# Patient Record
Sex: MALE | Race: WHITE | NOT HISPANIC OR LATINO | Employment: OTHER | URBAN - METROPOLITAN AREA
[De-identification: names, ages, dates, MRNs, and addresses within clinical notes are randomized per-mention and may not be internally consistent; named-entity substitution may affect disease eponyms.]

---

## 2017-09-26 PROBLEM — C18.9 ADENOCARCINOMA OF COLON: Status: ACTIVE | Noted: 2017-09-26

## 2017-09-26 PROBLEM — D63.0 ANEMIA IN NEOPLASTIC DISEASE: Status: ACTIVE | Noted: 2017-09-26

## 2018-04-17 PROBLEM — R10.32 LEFT LOWER QUADRANT PAIN: Status: ACTIVE | Noted: 2018-04-17

## 2018-04-17 PROBLEM — D63.0 ANEMIA IN NEOPLASTIC DISEASE: Status: RESOLVED | Noted: 2017-09-26 | Resolved: 2018-04-17

## 2018-10-09 PROBLEM — E66.813 CLASS 3 SEVERE OBESITY WITHOUT SERIOUS COMORBIDITY WITH BODY MASS INDEX (BMI) OF 40.0 TO 44.9 IN ADULT: Status: ACTIVE | Noted: 2018-01-31

## 2018-10-09 PROBLEM — E66.01 CLASS 3 SEVERE OBESITY WITHOUT SERIOUS COMORBIDITY WITH BODY MASS INDEX (BMI) OF 40.0 TO 44.9 IN ADULT: Status: ACTIVE | Noted: 2018-01-31

## 2018-11-12 PROBLEM — T14.8XXD DELAYED WOUND HEALING: Status: ACTIVE | Noted: 2018-11-12

## 2018-11-27 PROBLEM — R11.2 CINV (CHEMOTHERAPY-INDUCED NAUSEA AND VOMITING): Status: ACTIVE | Noted: 2018-11-27

## 2018-11-27 PROBLEM — T45.1X5A CINV (CHEMOTHERAPY-INDUCED NAUSEA AND VOMITING): Status: ACTIVE | Noted: 2018-11-27

## 2018-11-27 PROBLEM — Z71.9 ENCOUNTER FOR EDUCATION: Status: ACTIVE | Noted: 2018-11-27

## 2018-11-27 PROBLEM — K59.00 CONSTIPATION: Status: ACTIVE | Noted: 2018-08-27

## 2018-12-05 PROBLEM — E86.0 DEHYDRATION: Status: ACTIVE | Noted: 2018-12-05

## 2018-12-10 PROBLEM — T45.1X5A CHEMOTHERAPY INDUCED DIARRHEA: Status: ACTIVE | Noted: 2018-12-10

## 2018-12-10 PROBLEM — K12.31 MUCOSITIS DUE TO CHEMOTHERAPY: Status: ACTIVE | Noted: 2018-12-10

## 2018-12-10 PROBLEM — K52.1 CHEMOTHERAPY INDUCED DIARRHEA: Status: ACTIVE | Noted: 2018-12-10

## 2018-12-17 PROBLEM — Z51.11 ENCOUNTER FOR ANTINEOPLASTIC CHEMOTHERAPY: Status: ACTIVE | Noted: 2018-12-17

## 2018-12-17 PROBLEM — M79.89 LEFT UPPER EXTREMITY SWELLING: Status: ACTIVE | Noted: 2018-12-17

## 2018-12-17 PROBLEM — R63.4 WEIGHT LOSS, ABNORMAL: Status: ACTIVE | Noted: 2018-12-17

## 2018-12-17 PROBLEM — I82.409 DVT (DEEP VENOUS THROMBOSIS): Status: ACTIVE | Noted: 2018-12-17

## 2018-12-17 PROBLEM — D70.9 NEUTROPENIA: Status: ACTIVE | Noted: 2018-12-17

## 2019-01-06 PROBLEM — M79.89 LEFT UPPER EXTREMITY SWELLING: Status: RESOLVED | Noted: 2018-12-17 | Resolved: 2019-01-06

## 2019-01-06 PROBLEM — Z71.9 ENCOUNTER FOR EDUCATION: Status: RESOLVED | Noted: 2018-11-27 | Resolved: 2019-01-06

## 2019-01-09 PROBLEM — G62.0 NEUROPATHY DUE TO CHEMOTHERAPEUTIC DRUG: Status: ACTIVE | Noted: 2019-01-09

## 2019-01-09 PROBLEM — R53.0 NEOPLASTIC (MALIGNANT) RELATED FATIGUE: Status: ACTIVE | Noted: 2019-01-09

## 2019-01-09 PROBLEM — R53.1 GENERAL WEAKNESS: Status: ACTIVE | Noted: 2019-01-09

## 2019-01-09 PROBLEM — T45.1X5A NEUROPATHY DUE TO CHEMOTHERAPEUTIC DRUG: Status: ACTIVE | Noted: 2019-01-09

## 2019-01-22 PROBLEM — T14.8XXD DELAYED WOUND HEALING: Status: RESOLVED | Noted: 2018-11-12 | Resolved: 2019-01-22

## 2019-01-22 PROBLEM — Z79.01 ANTICOAGULATED: Status: ACTIVE | Noted: 2019-01-22

## 2019-02-05 PROBLEM — R73.9 BLOOD GLUCOSE ELEVATED: Status: ACTIVE | Noted: 2019-02-05

## 2019-02-05 PROBLEM — L70.8 ACNEIFORM RASH: Status: ACTIVE | Noted: 2019-02-05

## 2019-02-19 PROBLEM — L70.8 ACNEIFORM RASH: Status: RESOLVED | Noted: 2019-02-05 | Resolved: 2019-02-19

## 2019-02-19 PROBLEM — M54.6 ACUTE MIDLINE THORACIC BACK PAIN: Status: ACTIVE | Noted: 2019-02-19

## 2019-02-19 PROBLEM — R10.32 LEFT LOWER QUADRANT PAIN: Status: RESOLVED | Noted: 2018-04-17 | Resolved: 2019-02-19

## 2019-02-19 PROBLEM — R51.9 NONINTRACTABLE HEADACHE: Status: ACTIVE | Noted: 2019-02-19

## 2019-06-11 PROBLEM — K04.7 ABSCESSED TOOTH: Status: ACTIVE | Noted: 2019-06-11

## 2019-06-11 PROBLEM — M54.6 ACUTE MIDLINE THORACIC BACK PAIN: Status: RESOLVED | Noted: 2019-02-19 | Resolved: 2019-06-11

## 2019-06-11 PROBLEM — K12.31 MUCOSITIS DUE TO CHEMOTHERAPY: Status: RESOLVED | Noted: 2018-12-10 | Resolved: 2019-06-11

## 2019-06-11 PROBLEM — R63.4 WEIGHT LOSS, ABNORMAL: Status: RESOLVED | Noted: 2018-12-17 | Resolved: 2019-06-11

## 2019-08-01 ENCOUNTER — PATIENT MESSAGE (OUTPATIENT)
Dept: HEMATOLOGY/ONCOLOGY | Facility: CLINIC | Age: 49
End: 2019-08-01

## 2019-08-16 ENCOUNTER — PATIENT MESSAGE (OUTPATIENT)
Dept: HEMATOLOGY/ONCOLOGY | Facility: CLINIC | Age: 49
End: 2019-08-16

## 2019-09-06 ENCOUNTER — PATIENT MESSAGE (OUTPATIENT)
Dept: HEMATOLOGY/ONCOLOGY | Facility: CLINIC | Age: 49
End: 2019-09-06

## 2019-09-24 ENCOUNTER — INITIAL CONSULT (OUTPATIENT)
Dept: HEMATOLOGY/ONCOLOGY | Facility: CLINIC | Age: 49
End: 2019-09-24
Payer: COMMERCIAL

## 2019-09-24 VITALS
BODY MASS INDEX: 40.43 KG/M2 | TEMPERATURE: 98 F | RESPIRATION RATE: 18 BRPM | DIASTOLIC BLOOD PRESSURE: 74 MMHG | HEIGHT: 74 IN | HEART RATE: 74 BPM | OXYGEN SATURATION: 96 % | SYSTOLIC BLOOD PRESSURE: 131 MMHG | WEIGHT: 315 LBS

## 2019-09-24 DIAGNOSIS — T45.1X5A NEUROPATHY DUE TO CHEMOTHERAPEUTIC DRUG: ICD-10-CM

## 2019-09-24 DIAGNOSIS — G62.0 NEUROPATHY DUE TO CHEMOTHERAPEUTIC DRUG: ICD-10-CM

## 2019-09-24 DIAGNOSIS — I82.622 ACUTE DEEP VEIN THROMBOSIS (DVT) OF LEFT UPPER EXTREMITY, UNSPECIFIED VEIN: ICD-10-CM

## 2019-09-24 DIAGNOSIS — E66.01 CLASS 3 SEVERE OBESITY WITHOUT SERIOUS COMORBIDITY WITH BODY MASS INDEX (BMI) OF 40.0 TO 44.9 IN ADULT, UNSPECIFIED OBESITY TYPE: ICD-10-CM

## 2019-09-24 DIAGNOSIS — C18.9 ADENOCARCINOMA OF COLON: Primary | ICD-10-CM

## 2019-09-24 PROBLEM — R53.83 OTHER FATIGUE: Status: ACTIVE | Noted: 2019-01-09

## 2019-09-24 PROBLEM — Z51.11 ENCOUNTER FOR ANTINEOPLASTIC CHEMOTHERAPY: Status: RESOLVED | Noted: 2018-12-17 | Resolved: 2019-09-24

## 2019-09-24 PROCEDURE — 99999 PR PBB SHADOW E&M-EST. PATIENT-LVL IV: CPT | Mod: PBBFAC,,, | Performed by: INTERNAL MEDICINE

## 2019-09-24 PROCEDURE — 99214 OFFICE O/P EST MOD 30 MIN: CPT | Mod: S$GLB,,, | Performed by: INTERNAL MEDICINE

## 2019-09-24 PROCEDURE — 99214 PR OFFICE/OUTPT VISIT, EST, LEVL IV, 30-39 MIN: ICD-10-PCS | Mod: S$GLB,,, | Performed by: INTERNAL MEDICINE

## 2019-09-24 PROCEDURE — 3008F BODY MASS INDEX DOCD: CPT | Mod: CPTII,S$GLB,, | Performed by: INTERNAL MEDICINE

## 2019-09-24 PROCEDURE — 3008F PR BODY MASS INDEX (BMI) DOCUMENTED: ICD-10-PCS | Mod: CPTII,S$GLB,, | Performed by: INTERNAL MEDICINE

## 2019-09-24 PROCEDURE — 99999 PR PBB SHADOW E&M-EST. PATIENT-LVL IV: ICD-10-PCS | Mod: PBBFAC,,, | Performed by: INTERNAL MEDICINE

## 2019-09-24 RX ORDER — METFORMIN HYDROCHLORIDE 1000 MG/1
1000 TABLET ORAL
COMMUNITY
Start: 2019-09-26 | End: 2020-06-15

## 2019-09-24 RX ORDER — DULOXETIN HYDROCHLORIDE 20 MG/1
20 CAPSULE, DELAYED RELEASE ORAL DAILY
Qty: 30 CAPSULE | Refills: 0 | Status: SHIPPED | OUTPATIENT
Start: 2019-09-24 | End: 2019-10-16

## 2019-09-24 RX ORDER — GLIPIZIDE 5 MG/1
5 TABLET, FILM COATED, EXTENDED RELEASE ORAL 2 TIMES DAILY
COMMUNITY
Start: 2019-09-20 | End: 2020-06-15

## 2019-09-24 NOTE — PROGRESS NOTES
FOLLOW-UP     DATE: 09/24/2019  Patient Name: Fredis Ugarte  Diagnosis: colon ca      Subjective:       Patient ID: Fredis Ugarte is a 49 y.o. male.    Chief Complaint: Colon Cancer    HPI  50yo male presents for follow-up diagnosis colon cancer.       Oncologic history:  5/2017 - passing blood for 3 mo - BRB. Occasionally difficult with BM, diarrhea, constipation which was a change. No ab pain.   8/1/17 - CT a/p - Circumferential mass sigmoid colon causing significant luminal narrowing measuring approximately 2.6 X 2.8cm highly suggestive of malignancy with no evidence of metastatic disease to abdomen or pelvis.   8/1/17 he underwent colonoscopy that confirmed lesion at 20cm endophytic and ulcerated, 1/2 of colon lumen.   8/2/17 - hemicolectomy -     Pathology: low grade adenocarcinoma invading through muscularis propria into subserosal adipose tissue, neg margins, no LVI or PNI, no tumor deposits. 12 LN negative for tumor. Final pathologic stage pT3 pN0 - stage II. THI, intact.  8/10/17 CBC shows Hg 12.1g/dL, CMP noted and unremarkable. CEA is 2.2.  9/2017 - post-operative infections, required inpatient and outpatient I&D at incision site  9/27/17 - initial med onc eval  9/27/17 - CEA<0.3, ferritin 251, CBC, CMP good  10/27/17 - CT chest - PRIYANK (to complete staging)  12/18/17 - CEA <0.3, ferritin 163. CBC/CMP good  4/16/18 - CT C/A/P - PRIYANK, ferritin >100, CEA <0.3, Hg 16.3g/dL, CMP and CBC WNL  8/20/18- CEA 0.6  , ferritin. BM abnormal, referral back to GI.      9/27/18 - colonoscopy - biopsy of ulcer at anastomosis - pathology consis with adenocarcinoma  10/8/18 - CT c/a/p - PRIYANK   10/9/18 - med onc follow-up. Refer back to surgery.   10/22/18 - hemicolectomy   Pathology: pT3 pN1a w/ 1 of 16 LN positive for malignancy. Well to mod differentiated. No tumor deposits. R0 resection.   Primary tumor 1.5cm in size invading to subserosal fat but not to serosa. LVI noted. No PNI noted.   11/12/18  - med onc  "follow-up  12/3/18: C1D1 FOLFOX  12/5/18 - N/V, diarrhea, "miserable" symptoms per patient  12/6/18 - call with continued nausea, dirrhea better  12/9/18 - call with mucositis symptoms.   12/10/18: Tox check per MD     D8 and he reports multiple complaints and SE from chemo.     Day 1 - jaw pain, redness to feet and hands - this all resolved in 24hrs. also nausea, headache.   Day 2 - vomiting and constipation - dulcolax - then diarrhea started on D3  Day 3 - diarrhea severe that AM and took IVF and nausea med IV that helped at that time, this nausea recurred that night.  Day 4 - diarrhea better after imodium  Day 5 - took zofran and pheenrgan 25mg every 5 hours, this helped but not resolved it and needed decadron also.  Day 6 - mouth sores and tongue swelling developed. Felt "colon on fire", would "burp acid", more painful. Called on call doctor and got chlorhexidine  Day 7 - constipation 2 days again so he took MOM and had diarrhea 8 episodes.  12/10/18  D8 and he is feeling much better, he feels this is first improved day but normal. Extreme sensitivity to smell. Still mild nausea, no vomiting. Energy mild better. Pain with port when sleeping.    12/17/18 - C2D1 and reports continues to feel unwell. WBC 1.0k, plts 108k  D10 Started with diarrhea again and took imodium, lomotil, took a few days for relief.   Ativan helping nausea but it will not resolve. Minimal vomiting. Eating only soup. Lost weight. Some pain with swallowing.  New LUE swelling that started after sleeping and has been worsening.   No fever. No mouth sores.  Continues with pain, diffuse body aches and abdomen worse than chronic continuous pain. No blood in stool. Diarrhea is light colored. Urinating is okay.      12/17/18 - U/S LUE - Positive study. Occlusive thrombus within the left internal jugular, subclavian and axillary veins.  lovenox today in chemo and then start xarelto, discuss w/patient in infusion.   12/17/18 - DPD testing - resulted to " "us ~1/3/18 negative for enzyme deficiency.  12/19/18 - WBC 1.5k, hg 12.8g/dL, plts 118k  12/26/18 - wbc 27.4K, Hg 12.3g/dL, plts 109k  1/7/19 -  presents for follow-up and consider cycle 2 FOLFOX  1/14/19: tox check - feeling much better after dose reductions. still constipation and severe abd pain due to constipation. Neuropathy stable, persistent.  1/22/19 - C3 1800mg/m2 at actual body weight and BSA 2.75   2/5/19 - C4   Feels okay today but only for last 3 days. Prior to this felt unwell from chemo starting on day 6, severe fatigue and severe body aches diffuse, not specific pain in one place. In the bed. He stops dex supp on Saturday which is 5 and attributes the sudden severe symptoms to this. This then lasts a few days of feeling "miserable". No mouth sores with mouth wash, no N/V/D/C which is a huge improvement, constipation is improved due to daily laxative and stool softener not prn. Also right neck pain this cycle but this resolved. Previous had been left. No inc swelling to area of chronic DVT LUE. Urination okay. Ab pain is okay.     Neuropathy continues. Better in hands, continues in feet constant after one cycle of oxali in December and he doesn't feel improving. Labs are good and reviewed with pts, cytopenias have thankfully not recurred.     2/19/19: C5D1 - delayed to 2/26/19  On 2/19/19 - felt very unwell after last cycle with higher dose and this has lasted to today,headache ongoing, back pain, severe fatigue. Neck tightening feeling he got when chemo was "higher dose" before that had resolved and now recurs. He is eating but sometimes hurts to swallow. No fever. No diarrhea. +nausea. Labs are good and reviewed. He has felt quite miserable and wants to go back down to prior dose which is very reasonable. neurontin did help his neuropathy greatly so he is happy about that.     3/12/19 C6D1  Feeling okay today. SE with last cycle did recur but he does feel more tolerable and last shorter duration at " "this dose compared to last. Pain chest neck head recurred and resolved with pain meds and time. No change. BM okay. Energy is poor. No fever.     3/26/19: C7D1 today:   Feeling well today with minimal pain at this time. He states that the dilaudid is helping. He finds he is doing better now that the dose have been reduced on the 5FU but he is still having pain to the right shoulder. MD had advised the port be removed due and a new one placed and pt is refusing at this time. Overall he is feeling more fatigued but is able to continue doing his ADLs, he worked out in the yard yesterday which is great. Constipation under control with medication regime of stool softeners and miralax. Nausea controlled with phenergan. Appetite ok. Denies fever, coughing, new or worsening pain. SOB only upon exertion.       4/9/19: C8D1 - reports severe fatigue last cycle and nausea which was previously controlled and now recurring despite meds. Only felt mild better this AM after whole 2 weeks. QUINTERO continues and mild worse. Pain after chemo continues but dilaudid helping greatly so he feels this isn't major issue any longer. BM managed with meds. No fever chills. +mouth sores. Swelling in left arm remains better. He has rash bilat forearms today discussed likely sun exposure related with cehmo he is on and strict precaution going forward.     He is reluctant to continue chemo due to feeling "miserable" but wants to continue as well to "get it done". We reviewed labs are good overall.     4/30/19: C9D1- today pt is doing well with little complaints. He feels better than he has ever felt and contributes it to the lower 5FU dose and taking decadron scheduled. He had enough energy last weekend to go fishing. Minimal pain. Bowels are controlled. Neuropathy slight, not worsening. Taking gabapentin 300mg at night which is helping to decrease daytime drowsiness. Overall feeling very good today.     5/14/19: today is C10D1. Severe fatigue - he " skipped IVF because he felt well and now attributes this to feeling unwell this cycle - last cycle was much improved. No fever, mouth sores, he does have wax wane poor appetite, still eating. Pain is unchanged, mild and well controlled with dilaudid. No new pain. No ab pain. No tylenol or EtOH, discussed LFT trend.     5/28/19: C11D1 today. Pt is feeling well today. He states that he had enough energy to go fishing over the weekend and felt like the break he had was much needed.      6/11/19: patient feeling well but contacted by dentist 6/10/10 about abscess tooth and need to have pulled this week. He reports today had it drained 6/10 and on abx with relief of pain. He otherwise is feeling well and scans 6/10/19 show PRIYANK which is great news. He reports continued severe fatigue with chemo that lasts longer each cycle. He is due for last cycle of chemo today but due to dental infection, long discussion about timing of last cycle chemo, omit versus delay, long term plan for surveilancce, risk for recurrence, mgmt of anticoag and DVT/mediport.     9/24/19 - today patient presents for follow-up now in surveillance.   He is feeling continually better overall now that he is not on chemotherapy.  Neuropathy continues to be a major issue and very painful at night despite Neurontin 300 mg.  He could not tolerate Neurontin during the day due to somnolence.  The daytime discomfort is less.  Fatigue is present but better.  He is more active.  0 GI symptoms.  He had a colonoscopy September 7th that he reports showed great results, we will obtain.  No change in stool.  His MediPort was removed and happy about that.  He continues on his anticoagulant for DVT associated with MediPort and we will plan to stop that in 1 month.  We reviewed recent CT scans and labs which are all good result, remains PRIYANK.  He is now under treatment for diabetes with 2 medications, blood glucose is much improved.  He is motivated to lose weight and diet  "so that he hopefully can come off of these medications.  Wife very supportive.    Past medical, surgical, family, and social history were reviewed today and there are no changes of note unless mentioned in HPI.     MEDS and ALLERGIES were reviewed with patient and meds reconciled.       Review of Systems      Constitutional: Negative for activity change, appetite change, positive fatigue, fever and unexpected weight change.   HENT: Negative for mouth sores and sore throat.    Respiratory: Negative for cough and shortness of breath.    Cardiovascular: Negative for chest pain, palpitations and leg swelling.   Gastrointestinal: Negative for abdominal pain, constipation and diarrhea.   Genitourinary: Negative for dysuria and frequency.   Musculoskeletal: Negative for back pain and joint swelling.   Neurological: Negative for weakness, light-headedness and +numbness.   Hematological: Does not bruise/bleed easily.   Skin: no rash, no lesions, no itching      Objective:      /74 (BP Location: Right arm, Patient Position: Sitting, BP Method: Large (Automatic))   Pulse 74   Temp 97.9 °F (36.6 °C) (Oral)   Resp 18   Ht 6' 2" (1.88 m)   Wt (!) 143.4 kg (316 lb 2.2 oz)   SpO2 96%   BMI 40.59 kg/m²      Wt Readings from Last 8 Encounters:   09/24/19 (!) 143.4 kg (316 lb 2.2 oz)   06/26/19 (!) 141 kg (310 lb 12.8 oz)   06/24/19 (!) 140.6 kg (310 lb)   06/21/19 (!) 142.7 kg (314 lb 9.6 oz)   06/19/19 (!) 143.2 kg (315 lb 9.6 oz)   06/11/19 (!) 141.4 kg (311 lb 12.8 oz)   06/05/19 (!) 140.6 kg (310 lb)   06/03/19 (!) 141.6 kg (312 lb 3.2 oz)     Physical Exam    Constitutional: He is oriented to person, place, and time. No distress.  appears much improved from last visit closer to chemo.  Alopecia resolved.  HENT: Mouth/Throat: Oropharynx is clear and moist. No oropharyngeal exudate.   Eyes: Conjunctivae and EOM are normal.   Neck: Normal range of motion. Neck supple.   Cardiovascular: Normal rate, regular rhythm, " normal heart sounds and intact distal pulses.    Pulmonary/Chest: Effort normal and breath sounds normal. he has no wheezes.   He has no rales.    Abdominal: Soft. Bowel sounds are normal. he exhibits no distension. There is no tenderness.   Musculoskeletal: he exhibits no edema or tenderness.   Lymphadenopathy:     he has no cervical adenopathy. no supraclavicular adenopathy. No axillary LAD.   Neurological: he is alert and oriented to person, place, and time. he has normal strength. No cranial nerve deficit or sensory deficit.   Skin: Skin is warm and dry. No rash noted. No erythema.   Psychiatric: he has a normal mood and affect. speech is normal.   Nursing note and vitals reviewed.      Recent Labs     09/23/19  0826   WBC 7.00   RBC 5.22   HGB 15.2   HCT 45.4   MCV 87   MCH 29.1   MCHC 33.4   RDW 14.5      MPV 8.5*   GRAN 62.4  4.4   LYMPH 26.0  1.8   MONO 8.2  0.6   NRBC 0   EOSINOPHIL 2.6   BASOPHIL 0.8   DIFFMETHOD Automated         Recent Labs     09/23/19  0826      K 4.7      CO2 24   *   BUN 17   CREATININE 0.90   CALCIUM 9.5   PROT 7.0   ALBUMIN 4.0   BILITOT 0.4   ALKPHOS 135   AST 34   ALT 36   ESTGFRAFRICA >60   EGFRNONAA >60       Cbc, cmp reviewed today and results discussed , improved      Assessment:       1. Adenocarcinoma of colon    2. Class 3 severe obesity without serious comorbidity with body mass index (BMI) of 40.0 to 44.9 in adult, unspecified obesity type    3. Acute deep vein thrombosis (DVT) of left upper extremity, unspecified vein    4. Neuropathy due to chemotherapeutic drug      Cancer Staging  Adenocarcinoma of colon  Staging form: Colon and Rectum, AJCC 7th Edition  - Clinical stage from 9/26/2017: Stage IIA (T3, N0, M0) - Unsigned  - Clinical: No stage assigned - Unsigned  - Pathologic stage from 11/12/2018: Stage IIIB (T3, N1a, cM0) - Unsigned    ECOG SCORE       ECOG 1-2    46yo male here with diagnosis pT3 pN0 stage II low grade adenocarcinoma of  the colon. THI intact. Diagnosed 8/2017. THEN with recurrence 9/2018, pT3 pN1 stage III again low-mod grade adenocarcinoma.    At initial visit:  Discussed diagnosis, work-up, staging and treatment recommendations in great detail with patient and family  Discussed overall low risk features about his pathology, including low grade, T3, no PNI or LVI, 12 negative LN, no obstruction at diagnosis. This is overall good in regards to no indication for adjuvant chemotherapy to improve chance of cure. Recommend go into surveillance.     discussed importance of MSI testing which we would want on any stage II-IV tumor but additionally for hereditary evaluation since he is less than 50 years old and he does have colon cancer in his family. This was negative.    NCCN guidelines suggest H&P and CEA every 3-6 mo for 2 years then every 6 mo for 5 years then yearly thereafter. CT c/a/p every 6-12 mo with preference for every 12 mo for a total of 5 years. Colonoscopy in one year unless there was obstructing lesion at diagnosis - then 3-6 mo out from treatment. PET-CT is not recommended. Also consider aspirin daily to decrease risk of recurrence in colon cancer.     unfortunately 9/2018 recurrence at anastomosis, surgery 10/2018 s/p sigmoidectomy with stage III recurrent disease.     discussed this in great detail, staging, standard of care adjuvant chemo FOLFOX since he did not receive chemo initially, this is per NCCN guidelines to decrease his risk of recurrence going forward. Discussed benefits and risks as well as side effect profile of recommended chemo regimen FOLFOX. Discussed nutrition, exercise, restrictions during chemotherapy.    12/3/18: C1D1 FOLFOX today.     12/10/18 - Severe SE with C1, discussed in great detail, will adjust chemo, discussed benefit/risk of dose adjustment in curative setting, will just drop bolus for now and add supportive meds. SE with C1 were so severe he would not continue if it remained unchanged  anyway. Will add supportive meds and re-eval closely, I think we can improve greatly with C2. Fortunately labs after C1 look excellent so that is good news.     1/9/19: cont multiple SE overall improved but still present and concerning including G3 neuropathy symptoms,  and neutropenic unusual late-onset and prolonged. Concern for greater SE than expected, we discussed large dose due to BSA and rationale for curative intent treatment at full dose. Very poor tolerance/SE at that dose, DYPD testing did not reveal enzyme deficiency to explain his reaction. He is obese and deconditioned and I do think this contributes to his side effects, advised PT/exercise. Extensive discussion about all complaints in HPI plus low ANC, neutropenia risks, treatment, future prevention, precautions to take, and in context of his GI symptoms. QUINTERO is of concern with recent DVT as well, advised CT PE protocol before chemo given today. See plan below.     Discussed if he doesn't tolerate 50% dose reduction 5FU only today - would recommend referral to larger center to discuss chemo options since he cannot tolerate standard of care option, FOLFOX/XELOX being the only standard of care option in this scenario per guidelines. He and his wife show good understanding of all of this information and plan.     1/14/19: pt doing well today.     1/22/19: tolerated 50% dose red well, will increase to 75% of total dose 1800mg/m2. Continues with neuropathy so we will not plan to restart oxaliplatin.  Discussed again pros cons of dose reductions versus risk otherwise, he is agreeable to current plan.     2/5/19 - we discussed his multiple cont SE (and adjusted mgmt plan) in great detail and risk of chemo less effective with dose reduction. He is agreeable to increase to total of 2400mg/m2 at adjusted body weight which calculates BSA around 2.35 instead of 2.75. Diabetes concern with dex, refer to PCP, discussed pros/cons in great detail of dex in this  setting and will continue. He knows there are long term health risk to continuing steroids, at least they are not daily.    2/19/19: severe SE again last cycle with HA, back pain, throat pain, he feels similar to first dose. Discussed pros/cons change dosing and we will resume prior dose. They understand risk of chemo less effective, he has had tough course, they would like to proceed with chemo as done last cycle and remain without plan to add oxali since his neuropathic pain has persisted now two months. Discussed tx today for SE, hydration, meds and re-eval closely, delay tx and hopefully sxs improved to tx next week. discussed imaging utility today in regards to his complaints of pain and he declines because he knows we have done imaging in past for same symptoms with no results of concern, I think this is reasonable.     3/12/19 - doing well today overall considering prior SE though he is still having notable SE of severe fatigue, nausea, diarrhea and constipation, taste change, lower appetite and intake. We reviewed intervention for each and it is improved enough to be tolerable per his report. Reviewed continue current chemo dose and continue plan to complete 12 cycles adjuvant. discussed mediport, UE DVT, and chest pain/throat pain - that discomfort has greatly improved on lower dose of chemo, his arm swelling is greatly resolved today, but overall we will likely eval remove port at end of chemo due to all of these symptoms/complications and could stop anticoag if port removed. He knows there is risk to need port replaced if relapse.       3/26/19: today C7- proceed with treatment . After port dye study last week, MD reviewed images with radiologist. She does believe that the chemo could be infusing into some collateral veins to the right shoulder causing discomfort and pain. It is strongly advised that the port is to be removed and a new port placed. Damage to tissue and permanent damage can potentially  "take place. Pt understands this and still refuses to remove port. He will use this cycle as a "trial" and if continues to have pain with dilaudid he will consider having new port placed.    4/9/19 - C8 today: Very poor tolerance, fatigue, low stamina, "feel like i'm dying", does not think he can continue same dose/schedule and tolerate.   High risk decision making to discuss consider dec dose but complex decision since he is aware this can dec efficacy of decreasing recurrence risk.     Recommend delay one week and dose reduce for C8 on 4/16/19. He is reluctant to delay because he wants to stay on time but in agreement that he is likely to have high risk severe SE if he receivs chemo today since he is feeling so poorly. He will delay until 4/16/19. He is very agreeable to dose reduciotn. Also careful protection from sun.    4/30/19: C9D1 on 1800mg/m2 5FU dosed at BSA on 2.3. Pt tolerated this dose very well and we will remain at this dose. He will take decadron scheduled like he did with last cycle and will keep a close eye on his blood glucose. He was taking 8mg of dex, 4mg in am and 4 mg in afternoon. I have advised against this. I have instructed to take 4mg daily, if needed. He will try this. He appears to have gotten sun when fishing over the weekend, I have educated on sun safety, especially while on 5FU which can make make one more susceptible in the sun. MD updated and will see pt in infusion. Call with any questions or concerns.     5/14/19: here for C10 and feeling very unwell, worsened fatigue, weakness, and LFTs worsening. Discussed in great detail pros/cons/risks of proceeding with chemo and his history of complications with chemo. Will skip this cycle, he has plans next week he will not cahnge and we will continue on planned date of C11 in 2 weeks. High risk MDM, patient in agreement with this plan.    5/28/19: proceed with C11 today. He is feeling well with no complaints at this time. His break from " chemo was much needed and he is ready to resume. Will follow up with MD in 2 weeks for C12 then enter surveillance. MD updated on labs and patient status and MD will sign off on chemo orders.  All questions and concerns addressed.  Instructed patient to call for any new or worsening symptoms and/or side effects.     6/11/19: absccess tooth, discussed no chemo today and decision to delay one week so this can be addressed. Discussed CT findings including GGO in lungs and hepatic steatosis. Discussed surveillance plan after this last chemo cycle and risk recurrence. Discussed plan to remove mediport due to DVT notable and would plan to stop anticoag a few months after removal, he does have long term VTE risk but more so during active cancer/chemo/mediport in place. He agrees with having removed so he could stop anticoag. Multiple medical problems to consider overweight, increased BG, liver function, chemo SE, low stamina/strength. He is overall stable today and hopefully these can be addressed/improved as he completes chemo.     He reports colonscopy scheduled in 9/2019, he will try to get mediport removed at same time. He understands risk for recurrence and need for replacement of mediport if that occurs. We discussed his recurrence not evident on scans in past and his CEA did not increase, so we will continue Q3 month scans, colonoscopy with GI/surgery, and new symptoms/signs should prompt eval asap which is how this recurrence was found when he developed GI symptoms and had a GI eval and colonoscopy.     TODAY 09/24/2019 he is doing very well and remains clinically PRIYANK based on colonoscopy and CT scans, lab work.  Neuropathy debilitating and severe, discussed management, we will stop Neurontin and start Cymbalta.  Review surveillance plan in great detail due to his history of a recurrence, we will continue close follow-up for now.  Reviewed principles of survivorship, healthy weight and exercise.  Also discussed  anticoagulation and prior venous thrombosis, MediPort now removed which is good, 0 symptoms  .     Plan:       Fredis was seen today for colon cancer.    Diagnoses and all orders for this visit:    Adenocarcinoma of colon    Class 3 severe obesity without serious comorbidity with body mass index (BMI) of 40.0 to 44.9 in adult, unspecified obesity type    Acute deep vein thrombosis (DVT) of left upper extremity, unspecified vein    Neuropathy due to chemotherapeutic drug             PLAN:    Get colonoscopy report from 9/7/19 Dr. Logan Aburto office    Lab, CT scans 3 mo with phone review of results   Cbc, cmp, ferritin, iron profile, CEA    Lab, CT scans, MD vis in 6 mo    Continue yearly colonoscopy planned    Continue PCP follow-up, DM follow-up    Lose weight, exercise, this is planned    Stop neurontin, start cymbalta. Will start with 20mg daily and re-eval.       Plan above discussed in detail and all questions answered to their satisfaction.      Kinga Lee M.D.  Hematology Oncology  [facility]

## 2019-10-16 DIAGNOSIS — G62.0 NEUROPATHY DUE TO CHEMOTHERAPEUTIC DRUG: ICD-10-CM

## 2019-10-16 DIAGNOSIS — T45.1X5A NEUROPATHY DUE TO CHEMOTHERAPEUTIC DRUG: ICD-10-CM

## 2019-10-16 RX ORDER — DULOXETIN HYDROCHLORIDE 30 MG/1
30 CAPSULE, DELAYED RELEASE ORAL DAILY
Qty: 30 CAPSULE | Refills: 0 | Status: SHIPPED | OUTPATIENT
Start: 2019-10-16 | End: 2019-10-28 | Stop reason: ALTCHOICE

## 2019-10-16 NOTE — TELEPHONE ENCOUNTER
Patient contacted cymbalta effective and request refill. Will increase to 30mg daily and re-eval.    GM

## 2019-10-21 DIAGNOSIS — T45.1X5A NEUROPATHY DUE TO CHEMOTHERAPEUTIC DRUG: ICD-10-CM

## 2019-10-21 DIAGNOSIS — G62.0 NEUROPATHY DUE TO CHEMOTHERAPEUTIC DRUG: ICD-10-CM

## 2019-10-21 RX ORDER — DULOXETIN HYDROCHLORIDE 20 MG/1
CAPSULE, DELAYED RELEASE ORAL
Qty: 30 CAPSULE | Refills: 0 | OUTPATIENT
Start: 2019-10-21

## 2019-10-27 ENCOUNTER — NURSE TRIAGE (OUTPATIENT)
Dept: ADMINISTRATIVE | Facility: CLINIC | Age: 49
End: 2019-10-27

## 2019-10-27 DIAGNOSIS — G62.0 NEUROPATHY DUE TO CHEMOTHERAPEUTIC DRUG: Primary | ICD-10-CM

## 2019-10-27 DIAGNOSIS — T45.1X5A NEUROPATHY DUE TO CHEMOTHERAPEUTIC DRUG: Primary | ICD-10-CM

## 2019-10-27 DIAGNOSIS — F41.9 ANXIETY: ICD-10-CM

## 2019-10-27 NOTE — TELEPHONE ENCOUNTER
Reason for Disposition   Depression is main problem or symptom (e.g., feelings of sadness or hopelessness)   [1] Depression AND [2] unable to do any of normal activities (e.g., self care, school, work; in comparison to baseline).    Additional Information   Negative: Severe difficulty breathing (e.g., struggling for each breath, speaks in single words)   Negative: Bluish (or gray) lips or face now   Negative: Difficult to awaken or acting confused (e.g., disoriented, slurred speech)   Negative: Hysterical or combative behavior   Negative: Sounds like a life-threatening emergency to the triager   Negative: Chest pain   Negative: Palpitations, skipped heart beat, or rapid heart beat   Negative: Cough is main symptom   Negative: Suicide thoughts, threats, attempts, or questions   Negative: Patient attempted suicide   Negative: Patient is threatening suicide now   Negative: Violent behavior, or threatening to physically hurt or kill someone   Negative: [1] Patient is very confused (disoriented, slurred speech) AND [2] no other adult (e.g., friend or family member) available   Negative: [1] Difficult to awaken or acting very confused (disoriented, slurred speech) AND [2] new onset   Negative: Sounds like a life-threatening emergency to the triager   Negative: Alcohol use, abuse or dependence, question or problem related to   Negative: Drug abuse or dependence, question or problem related to   Negative: Bipolar disorder (manic depression)   Negative: Depression during the postpartum period (< 1 year since delivery)    Protocols used: ANXIETY AND PANIC ATTACK-A-AH, DEPRESSION-A-AH    Pt stated he recently increased his Cymbalta from 20 mg to 30 mg per instructions of his Doctor. Pt stated his is having a major panic attack since the medication was increased 3 days ago and severe depression that interferes with his ability to perform normal activities. Per triage protocol, Pt advised to go to ED. Pt  asked if he cannot stop medication. Advised to discuss with Provider in ED.

## 2019-10-28 RX ORDER — ALPRAZOLAM 0.5 MG/1
0.5 TABLET ORAL 3 TIMES DAILY PRN
Qty: 30 TABLET | Refills: 0 | Status: SHIPPED | OUTPATIENT
Start: 2019-10-28 | End: 2020-06-15

## 2019-10-28 RX ORDER — DULOXETIN HYDROCHLORIDE 20 MG/1
20 CAPSULE, DELAYED RELEASE ORAL DAILY
Qty: 30 CAPSULE | Refills: 2 | Status: SHIPPED | OUTPATIENT
Start: 2019-10-28 | End: 2019-11-11

## 2019-10-28 NOTE — TELEPHONE ENCOUNTER
Spoke with patient this morning, he did not go to ED, feels symptoms improving but anxiety still present and did not take the cymbalta yesterday. Discussed decrease back to 20mg dose he was doing well on and will also send xanax to pharmacy for current acute anxiety/panic attack. Directed to let us know if symptoms don't improve in next 1-2 days.     GM

## 2019-11-09 DIAGNOSIS — T45.1X5A NEUROPATHY DUE TO CHEMOTHERAPEUTIC DRUG: ICD-10-CM

## 2019-11-09 DIAGNOSIS — G62.0 NEUROPATHY DUE TO CHEMOTHERAPEUTIC DRUG: ICD-10-CM

## 2019-11-11 RX ORDER — DULOXETIN HYDROCHLORIDE 30 MG/1
CAPSULE, DELAYED RELEASE ORAL
Qty: 30 CAPSULE | Refills: 0 | OUTPATIENT
Start: 2019-11-11

## 2019-11-11 NOTE — TELEPHONE ENCOUNTER
Received request for refill on 30mg, I thought we changed this to 20mg after adverse reaction? Leandra, can you call patient and inquire? THANKS  HAN

## 2019-12-11 ENCOUNTER — PATIENT MESSAGE (OUTPATIENT)
Dept: HEMATOLOGY/ONCOLOGY | Facility: CLINIC | Age: 49
End: 2019-12-11

## 2020-02-10 ENCOUNTER — PATIENT MESSAGE (OUTPATIENT)
Dept: HEMATOLOGY/ONCOLOGY | Facility: CLINIC | Age: 50
End: 2020-02-10

## 2020-02-10 DIAGNOSIS — C18.9 ADENOCARCINOMA OF COLON: Primary | ICD-10-CM

## 2020-02-10 DIAGNOSIS — R10.9 ABDOMINAL PAIN, UNSPECIFIED ABDOMINAL LOCATION: ICD-10-CM

## 2020-02-12 ENCOUNTER — PATIENT MESSAGE (OUTPATIENT)
Dept: HEMATOLOGY/ONCOLOGY | Facility: CLINIC | Age: 50
End: 2020-02-12

## 2020-03-23 ENCOUNTER — TELEPHONE (OUTPATIENT)
Dept: HEMATOLOGY/ONCOLOGY | Facility: CLINIC | Age: 50
End: 2020-03-23

## 2020-03-23 ENCOUNTER — PATIENT MESSAGE (OUTPATIENT)
Dept: HEMATOLOGY/ONCOLOGY | Facility: CLINIC | Age: 50
End: 2020-03-23

## 2020-03-23 ENCOUNTER — TELEMEDICINE (OUTPATIENT)
Dept: HEMATOLOGY/ONCOLOGY | Facility: CLINIC | Age: 50
End: 2020-03-23

## 2020-03-23 DIAGNOSIS — R73.9 BLOOD GLUCOSE ELEVATED: ICD-10-CM

## 2020-03-23 DIAGNOSIS — E66.01 CLASS 3 SEVERE OBESITY WITHOUT SERIOUS COMORBIDITY WITH BODY MASS INDEX (BMI) OF 40.0 TO 44.9 IN ADULT, UNSPECIFIED OBESITY TYPE: ICD-10-CM

## 2020-03-23 DIAGNOSIS — R53.83 FATIGUE, UNSPECIFIED TYPE: ICD-10-CM

## 2020-03-23 DIAGNOSIS — T45.1X5A NEUROPATHY DUE TO CHEMOTHERAPEUTIC DRUG: ICD-10-CM

## 2020-03-23 DIAGNOSIS — R53.1 GENERAL WEAKNESS: ICD-10-CM

## 2020-03-23 DIAGNOSIS — R10.9 ABDOMINAL PAIN, UNSPECIFIED ABDOMINAL LOCATION: Primary | ICD-10-CM

## 2020-03-23 DIAGNOSIS — R10.9 ABDOMINAL PAIN, UNSPECIFIED ABDOMINAL LOCATION: ICD-10-CM

## 2020-03-23 DIAGNOSIS — G62.0 NEUROPATHY DUE TO CHEMOTHERAPEUTIC DRUG: ICD-10-CM

## 2020-03-23 DIAGNOSIS — C18.9 ADENOCARCINOMA OF COLON: Primary | ICD-10-CM

## 2020-03-23 PROBLEM — K52.1 CHEMOTHERAPY INDUCED DIARRHEA: Status: RESOLVED | Noted: 2018-12-10 | Resolved: 2020-03-23

## 2020-03-23 RX ORDER — HYDROCODONE BITARTRATE AND ACETAMINOPHEN 5; 325 MG/1; MG/1
1 TABLET ORAL EVERY 6 HOURS PRN
Qty: 40 TABLET | Refills: 0 | OUTPATIENT
Start: 2020-03-23 | End: 2020-03-23

## 2020-03-23 RX ORDER — HYDROCODONE BITARTRATE AND ACETAMINOPHEN 5; 325 MG/1; MG/1
1 TABLET ORAL EVERY 6 HOURS PRN
Qty: 40 TABLET | Refills: 0 | Status: SHIPPED | OUTPATIENT
Start: 2020-03-23 | End: 2020-03-25 | Stop reason: SDUPTHER

## 2020-03-23 RX ORDER — HYDROCODONE BITARTRATE AND ACETAMINOPHEN 5; 325 MG/1; MG/1
1 TABLET ORAL EVERY 6 HOURS PRN
Qty: 40 TABLET | Refills: 0 | OUTPATIENT
Start: 2020-03-23 | End: 2020-03-23 | Stop reason: SDUPTHER

## 2020-03-23 NOTE — TELEPHONE ENCOUNTER
"FOLLOW-UP VISIT    Patient name: Fredis Ugarte  Date: 3/23/20    TELEMEDICINE  The patient location is: home  The chief complaint leading to consultation is: colon ca surveillance    Visit type: Virtual visit with synchronous audio and video, video failed and audio only  Total time spent with patient:  30 min  Each patient to whom he or she provides medical services by telemedicine is:  (1) informed of the relationship between the physician and patient and the respective role of any other health care provider with respect to management of the patient; and (2) notified that he or she may decline to receive medical services by telemedicine and may withdraw from such care at any time.        Subjective:       Patient ID: Fredis Ugarte is a 49 y.o. male.      HPI  Patient presents via telemedicine for follow-up of history colon cancer.  He had stage II (T3N0) colon cancer and then recurred T3N1 resected and received chemotherapy with multiple adverse effects, see history.    Pain in abdominal area continues, under left rib this is recurring. It's not constant. Feels like stabbing and hurts down to rectum.     This pain was notable 2/2020 and CT a/p was PRIYANK. Pain has not resolved.   Not happening with meals. Very "random", at rest or activity. Middle of the night. Nsaids, tylenol do not help. He has seen GI, no diagnosis or tx.     feeling exhausted and fatigued more so. Just doesn't "feel good".   He feels ultimately this started with chemo so on recollection doesn't feel this is really new since last spring when he completed chemo.     Works in yard and garden and in pasture daily  Walking for exercise with his wife, 5 days per week, 30-45min.  Diet and exercise is improving his BG - lost 12lbs at least and less than 300lbs.     Reviewed recent labs scans detail      See detailed oncology history below, updated with most recent scans, labs, pertinent medical history.      Adenocarcinoma of colon    9/26/2017 " "Initial Diagnosis     Adenocarcinoma of colon    Oncologic history:  5/2017 - passing blood for 3 mo - BRB. Occasionally difficult with BM, diarrhea, constipation which was a change. No ab pain.   8/1/17 - CT a/p - Circumferential mass sigmoid colon causing significant luminal narrowing measuring approximately 2.6 X 2.8cm highly suggestive of malignancy with no evidence of metastatic disease to abdomen or pelvis.   8/1/17 he underwent colonoscopy that confirmed lesion at 20cm endophytic and ulcerated, 1/2 of colon lumen.   8/2/17 - hemicolectomy -                      Pathology: low grade adenocarcinoma invading through muscularis propria into subserosal adipose tissue, neg margins, no LVI or PNI, no tumor deposits. 12 LN negative for tumor. Final pathologic stage pT3 pN0 - stage II. THI, intact.  8/10/17 CBC shows Hg 12.1g/dL, CMP noted and unremarkable. CEA is 2.2.  9/2017 - post-operative infections, required inpatient and outpatient I&D at incision site  9/27/17 - initial med onc eval  9/27/17 - CEA<0.3, ferritin 251, CBC, CMP good  10/27/17 - CT chest - PRIYANK (to complete staging)  12/18/17 - CEA <0.3, ferritin 163. CBC/CMP good  4/16/18 - CT C/A/P - PRIYANK, ferritin >100, CEA <0.3, Hg 16.3g/dL, CMP and CBC WNL  8/20/18- CEA 0.6  , ferritin. BM abnormal, referral back to GI.      9/27/18 - colonoscopy - biopsy of ulcer at anastomosis - pathology consis with adenocarcinoma  10/8/18 - CT c/a/p - PRIYANK   10/9/18 - med onc follow-up. Refer back to surgery.   10/22/18 - hemicolectomy              Pathology: pT3 pN1a w/ 1 of 16 LN positive for malignancy. Well to mod differentiated. No tumor deposits. R0 resection.              Primary tumor 1.5cm in size invading to subserosal fat but not to serosa. LVI noted. No PNI noted.   11/12/18  - med onc follow-up  12/3/18: C1D1 FOLFOX  12/5/18 - N/V, diarrhea, "miserable" symptoms per patient  12/6/18 - call with continued nausea, dirrhea better  12/9/18 - call with mucositis " "symptoms.   12/10/18: Tox check per MD                D8 and he reports multiple complaints and SE from chemo.      Day 1 - jaw pain, redness to feet and hands - this all resolved in 24hrs. also nausea, headache.   Day 2 - vomiting and constipation - dulcolax - then diarrhea started on D3  Day 3 - diarrhea severe that AM and took IVF and nausea med IV that helped at that time, this nausea recurred that night.  Day 4 - diarrhea better after imodium  Day 5 - took zofran and pheenrgan 25mg every 5 hours, this helped but not resolved it and needed decadron also.  Day 6 - mouth sores and tongue swelling developed. Felt "colon on fire", would "burp acid", more painful. Called on call doctor and got chlorhexidine  Day 7 - constipation 2 days again so he took MOM and had diarrhea 8 episodes.  12/10/18  D8 and he is feeling much better, he feels this is first improved day but normal. Extreme sensitivity to smell. Still mild nausea, no vomiting. Energy mild better. Pain with port when sleeping.     12/17/18 - C2D1 and reports continues to feel unwell. WBC 1.0k, plts 108k  D10 Started with diarrhea again and took imodium, lomotil, took a few days for relief.   Ativan helping nausea but it will not resolve. Minimal vomiting. Eating only soup. Lost weight. Some pain with swallowing.  New LUE swelling that started after sleeping and has been worsening.   No fever. No mouth sores.  Continues with pain, diffuse body aches and abdomen worse than chronic continuous pain. No blood in stool. Diarrhea is light colored. Urinating is okay.      12/17/18 - U/S LUE - Positive study. Occlusive thrombus within the left internal jugular, subclavian and axillary veins.  lovenox today in chemo and then start xarelto, discuss w/patient in infusion.   12/17/18 - DPD testing - resulted to us ~1/3/18 negative for enzyme deficiency.  12/19/18 - WBC 1.5k, hg 12.8g/dL, plts 118k  12/26/18 - wbc 27.4K, Hg 12.3g/dL, plts 109k  1/7/19 -  presents for " "follow-up and consider cycle 2 FOLFOX  1/14/19: tox check - feeling much better after dose reductions. still constipation and severe abd pain due to constipation. Neuropathy stable, persistent.  1/22/19 - C3 1800mg/m2 at actual body weight and BSA 2.75   2/5/19 - C4              Feels okay today but only for last 3 days. Prior to this felt unwell from chemo starting on day 6, severe fatigue and severe body aches diffuse, not specific pain in one place. In the bed. He stops dex supp on Saturday which is 5 and attributes the sudden severe symptoms to this. This then lasts a few days of feeling "miserable". No mouth sores with mouth wash, no N/V/D/C which is a huge improvement, constipation is improved due to daily laxative and stool softener not prn. Also right neck pain this cycle but this resolved. Previous had been left. No inc swelling to area of chronic DVT LUE. Urination okay. Ab pain is okay.      Neuropathy continues. Better in hands, continues in feet constant after one cycle of oxali in December and he doesn't feel improving. Labs are good and reviewed with pts, cytopenias have thankfully not recurred.      2/19/19: C5D1 - delayed to 2/26/19  On 2/19/19 - felt very unwell after last cycle with higher dose and this has lasted to today,headache ongoing, back pain, severe fatigue. Neck tightening feeling he got when chemo was "higher dose" before that had resolved and now recurs. He is eating but sometimes hurts to swallow. No fever. No diarrhea. +nausea. Labs are good and reviewed. He has felt quite miserable and wants to go back down to prior dose which is very reasonable. neurontin did help his neuropathy greatly so he is happy about that.      3/12/19 C6D1  Feeling okay today. SE with last cycle did recur but he does feel more tolerable and last shorter duration at this dose compared to last. Pain chest neck head recurred and resolved with pain meds and time. No change.      3/26/19: C7D1 today:   Feeling " "well today with minimal pain at this time. He states that the dilaudid is helping. He finds he is doing better now that the dose have been reduced on the 5FU MD had advised the port be removed due and a new one placed and pt is refusing at this time.          4/9/19: C8D1 - reports severe fatigue last cycle and nausea which was previously controlled and now recurring despite meds. QUINTERO continues and mild worse. Pain after chemo continues but dilaudid helping greatly so he feels this isn't major issue any longer.      He is reluctant to continue chemo due to feeling "miserable" but wants to continue as well to "get it done". We reviewed labs are good overall.      4/30/19: C9D1- He feels better than he has ever felt and contributes it to the lower 5FU dose and taking decadron scheduled. He had enough energy last weekend to go fishing. Minimal pain. Neuropathy slight, not worsening. Taking gabapentin 300mg at night which is helping to decrease daytime drowsiness.      5/14/19: today is C10D1. Severe fatigue - he skipped IVF because he felt well and now attributes this to feeling unwell this cycle - last cycle was much improved.      5/28/19: C11D1 today. Pt is feeling well today. He states that he had enough energy to go fishing over the weekend and felt like the break he had was much needed.       6/11/19: patient feeling well but contacted by dentist 6/10/10 about abscess tooth and need to have pulled this week. He reports today had it drained 6/10 and on abx with relief of pain. He otherwise is feeling well and scans 6/10/19 show PRIYANK which is great news. He reports continued severe fatigue with chemo that lasts longer each cycle. He is due for last cycle of chemo today but due to dental infection, long discussion about timing of last cycle chemo, omit versus delay, long term plan for surveilancce, risk for recurrence, mgmt of anticoag and DVT/mediport. cancel last cycle.     9/24/19 - today patient presents for " follow-up now in surveillance.   He is feeling continually better overall now that he is not on chemotherapy  9/2019 - Cscope - PRIYANK  12/2019-CT chest abdomen pelvis PRIYANK  2/10/20 - CT a/p for acute pain - Fatty liver, Diverticulosis but no diverticulitis, Few small periportal nodes unchanged. PRIYANK (malig)  3/2020 - cbc, cmp, cea, ferritin, iron profile good  3/23/20 - telemed MD follow-up         I have reviewed my initial consult note with detailed PMH/ROS from initial encounter and all following progress notes.   Past medical, surgical, family, and social history were reviewed today and there are no changes of note unless mentioned in HPI.     MEDS and ALLERGIES were reviewed with patient and meds reconciled.     Fatigue  Yes see HPI    Weight/appetite  Good    Constitutional  Denies F/C    Pain  +   Sleep  Ok    Mucositis  Denies    Cardiovascular  Denies CP    Respiratory  Denies SOB    Nausea  Denies    BMs  Normal    GI  +abdominal pain    Bleeding  Denies epistaxis, hemoptysis, BRBPR, melena, hematuria or any other bleeding    Extremities  Denies edema    Skin/nail/hair  Denies toxicity    Neuropathy  Denies    Psych  In good spirits    Misc  n/a    Exercise  Yes, this is improvement          Objective:      There were no vitals taken for this visit.  Wt Readings from Last 30 Encounters:   09/24/19 (!) 143.4 kg (316 lb 2.2 oz)   06/26/19 (!) 141 kg (310 lb 12.8 oz)   06/24/19 (!) 140.6 kg (310 lb)   06/21/19 (!) 142.7 kg (314 lb 9.6 oz)   06/19/19 (!) 143.2 kg (315 lb 9.6 oz)   06/11/19 (!) 141.4 kg (311 lb 12.8 oz)   06/05/19 (!) 140.6 kg (310 lb)   06/03/19 (!) 141.6 kg (312 lb 3.2 oz)   05/30/19 (!) 144.2 kg (317 lb 12.8 oz)   05/28/19 (!) 144.2 kg (318 lb)   05/28/19 (!) 143.6 kg (316 lb 9.6 oz)   05/22/19 (!) 144.5 kg (318 lb 9.6 oz)   05/20/19 (!) 143.8 kg (317 lb)   05/14/19 (!) 142.3 kg (313 lb 12.8 oz)   05/02/19 (!) 142.9 kg (315 lb)   04/30/19 (!) 143.6 kg (316 lb 9.6 oz)   04/30/19 (!) 143.1 kg (315  lb 6.4 oz)   04/22/19 (!) 141.3 kg (311 lb 9.6 oz)   04/18/19 (!) 143.7 kg (316 lb 12.8 oz)   04/16/19 (!) 144.4 kg (318 lb 6.4 oz)   04/09/19 (!) 142.1 kg (313 lb 3.2 oz)   04/03/19 (!) 143.6 kg (316 lb 9.6 oz)   04/01/19 (!) 144.2 kg (317 lb 12.8 oz)   03/29/19 (!) 144.7 kg (319 lb)   03/28/19 (!) 146.4 kg (322 lb 12.8 oz)   03/26/19 (!) 144.3 kg (318 lb 3.2 oz)   03/26/19 (!) 143.7 kg (316 lb 12.8 oz)   03/20/19 (!) 145.3 kg (320 lb 6.4 oz)   03/18/19 (!) 144.6 kg (318 lb 12.8 oz)   03/15/19 (!) 144.5 kg (318 lb 9.6 oz)         CBC, CMP reviewed, noted are overall good/improved, ferritin and iron profile  Assessment:       1. Adenocarcinoma of colon    2. Class 3 severe obesity without serious comorbidity with body mass index (BMI) of 40.0 to 44.9 in adult, unspecified obesity type    3. Fatigue, unspecified type    4. Abdominal pain, unspecified abdominal location    5. Blood glucose elevated    6. Neuropathy due to chemotherapeutic drug    7. General weakness      Cancer Staging  Adenocarcinoma of colon  Staging form: Colon and Rectum, AJCC 7th Edition  - Clinical stage from 9/26/2017: Stage IIA (T3, N0, M0) - Unsigned  - Clinical: No stage assigned - Unsigned  - Pathologic stage from 11/12/2018: Stage IIIB (T3, N1a, cM0) - Unsigned    46yo male here with diagnosis pT3 pN0 stage II low grade adenocarcinoma of the colon. THI intact. Diagnosed 8/2017. THEN with recurrence 9/2018, pT3 pN1 stage III again low-mod grade adenocarcinoma.     At initial visit:  Discussed diagnosis, work-up, staging and treatment recommendations in great detail with patient and family  Discussed overall low risk features about his pathology, including low grade, T3, no PNI or LVI, 12 negative LN, no obstruction at diagnosis. This is overall good in regards to no indication for adjuvant chemotherapy to improve chance of cure. Recommend go into surveillance.      discussed importance of MSI testing which we would want on any stage II-IV  tumor but additionally for hereditary evaluation since he is less than 50 years old and he does have colon cancer in his family. This was negative.     NCCN guidelines suggest H&P and CEA every 3-6 mo for 2 years then every 6 mo for 5 years then yearly thereafter. CT c/a/p every 6-12 mo with preference for every 12 mo for a total of 5 years. Colonoscopy in one year unless there was obstructing lesion at diagnosis - then 3-6 mo out from treatment. PET-CT is not recommended. Also consider aspirin daily to decrease risk of recurrence in colon cancer.      unfortunately 9/2018 recurrence at anastomosis, surgery 10/2018 s/p sigmoidectomy with stage III recurrent disease.      discussed this in great detail, staging, standard of care adjuvant chemo FOLFOX since he did not receive chemo initially, this is per NCCN guidelines to decrease his risk of recurrence going forward. Discussed benefits and risks as well as side effect profile of recommended chemo regimen FOLFOX. Discussed nutrition, exercise, restrictions during chemotherapy.     12/3/18: C1D1 FOLFOX severe side effects with cycle 1 and delayed to January for cycle 2.  Completed 11 cycles by June 2019 with multiple adverse effects, delays, dose reductions     6/11/19: absccess tooth, discussed no chemo today and decision to delay one week so this can be addressed. Discussed CT findings including GGO in lungs and hepatic steatosis. Discussed surveillance plan after this last chemo cycle and risk recurrence. Discussed plan to remove mediport due to DVT notable and would plan to stop anticoag a few months after removal, he does have long term VTE risk but more so during active cancer/chemo/mediport in place. He agrees with having removed so he could stop anticoag. Multiple medical problems to consider overweight, increased BG, liver function, chemo SE, low stamina/strength. He is overall stable today and hopefully these can be addressed/improved as he completes chemo.       He reports colonscopy scheduled in 9/2019, he will try to get mediport removed at same time. He understands risk for recurrence and need for replacement of mediport if that occurs. We discussed his recurrence not evident on scans in past and his CEA did not increase, so we will continue Q3 month scans, colonoscopy with GI/surgery, and new symptoms/signs should prompt eval asap which is how this recurrence was found when he developed GI symptoms and had a GI eval and colonoscopy.       09/24/2019 he is doing very well and remains clinically PRIYANK based on colonoscopy and CT scans, lab work.  Neuropathy debilitating and severe, discussed management, we will stop Neurontin and start Cymbalta.  Review surveillance plan in great detail due to his history of a recurrence, we will continue close follow-up for now.  Reviewed principles of survivorship, healthy weight and exercise.  Also discussed anticoagulation and prior venous thrombosis, MediPort now removed which is good, 0 symptoms      today 3/23/20 -   Remains PRIYANK.   Multiple complaints fatigue since chemo multifactorial, obesity, will eval testosterone. Abdominal pain may be related to abdominal wall, he did have issues with pelvic floor after first surgery and PT helped, CT a/p in December and February are not revealing. Will discuss with PT and pain meds temporary per his request due to severity.     Reviewed surveillanec plan - per NCCN -H&P every 3-6 mo for 2y then every 6 mo for a total of 5y. CEA very 3-6mo for 2y, then every 6 mo for a total of 5 y. CT c/a/p every 3-6 mo for 2y, then every 6-12 mo for years 3-5. Colonoscopy done after completion of chemo 9/2010, repeat in 3y then every 5y if no adenoma.     Discussed COVID-19 and social distancing in great detail, avoid all non-essential visits out of the home if possible and avoid sick contacts.         Plan:       Diagnoses and all orders for this visit:    Adenocarcinoma of colon    Class 3 severe  obesity without serious comorbidity with body mass index (BMI) of 40.0 to 44.9 in adult, unspecified obesity type    Fatigue, unspecified type    Abdominal pain, unspecified abdominal location    Blood glucose elevated    Neuropathy due to chemotherapeutic drug    General weakness            June or July 2020 CT chest abdomen pelvis for surveillance with labs and MD telemedicine visit 1-3 days after for review    Pain meds today and will disucss with pelvic floor PT he saw prior, guille wolfe, for home exercises if possible current covid-19 restrictions for visits.     Exercise/nutrition important, reviewed.  Advised weight loss, this is improving    Continue current medications, reviewed.      Important to keep follow-up with PCP and GI/surgery     Discussed plan above in great detail with patient and all questions answered to their satisfaction. Proceed with plan above.          Kinga Lee M.D.  Hematology Oncology  [facility]

## 2020-03-23 NOTE — PROGRESS NOTES
"FOLLOW-UP VISIT    Patient name: Fredis Ugarte  Date: 3/23/20    TELEMEDICINE  The patient location is: home  The chief complaint leading to consultation is: colon ca surveillance    Visit type: Virtual visit with synchronous audio and video, video failed and audio only  Total time spent with patient:  30 min  Each patient to whom he or she provides medical services by telemedicine is:  (1) informed of the relationship between the physician and patient and the respective role of any other health care provider with respect to management of the patient; and (2) notified that he or she may decline to receive medical services by telemedicine and may withdraw from such care at any time.        Subjective:       Patient ID: Fredis Ugarte is a 49 y.o. male.      HPI  Patient presents via telemedicine for follow-up of history colon cancer.  He had stage II colon cancer and then recurred and received chemotherapy with multiple adverse effects, see history.    Pain in abdominal area continues, under left rib this is recurring. It's not constant. Feels like stabbing and hurts down to rectum.     This pain was notable 2/2020 and CT a/p was PRIYANK. Pain has not resolved.   Not happening with meals. Very "random", at rest or activity.     feeling exhausted and fatigued more so. Just doesn't "feel good".   He feels ultimately this started with chemo      Works in yard and garden and in pasture  Walking for exercise with his wife, 5 days per week, 30-45min.  Diet and exercise is improving his BG - lost 12lbs at least and less than 300lbs.       See detailed oncology history below, updated with most recent scans, labs, pertinent medical history.      Adenocarcinoma of colon    9/26/2017 Initial Diagnosis     Adenocarcinoma of colon    Oncologic history:  5/2017 - passing blood for 3 mo - BRB. Occasionally difficult with BM, diarrhea, constipation which was a change. No ab pain.   8/1/17 - CT a/p - Circumferential mass sigmoid " "colon causing significant luminal narrowing measuring approximately 2.6 X 2.8cm highly suggestive of malignancy with no evidence of metastatic disease to abdomen or pelvis.   8/1/17 he underwent colonoscopy that confirmed lesion at 20cm endophytic and ulcerated, 1/2 of colon lumen.   8/2/17 - hemicolectomy -                      Pathology: low grade adenocarcinoma invading through muscularis propria into subserosal adipose tissue, neg margins, no LVI or PNI, no tumor deposits. 12 LN negative for tumor. Final pathologic stage pT3 pN0 - stage II. THI, intact.  8/10/17 CBC shows Hg 12.1g/dL, CMP noted and unremarkable. CEA is 2.2.  9/2017 - post-operative infections, required inpatient and outpatient I&D at incision site  9/27/17 - initial med onc eval  9/27/17 - CEA<0.3, ferritin 251, CBC, CMP good  10/27/17 - CT chest - PRIYANK (to complete staging)  12/18/17 - CEA <0.3, ferritin 163. CBC/CMP good  4/16/18 - CT C/A/P - PRIYANK, ferritin >100, CEA <0.3, Hg 16.3g/dL, CMP and CBC WNL  8/20/18- CEA 0.6  , ferritin. BM abnormal, referral back to GI.      9/27/18 - colonoscopy - biopsy of ulcer at anastomosis - pathology consis with adenocarcinoma  10/8/18 - CT c/a/p - PRIYANK   10/9/18 - med onc follow-up. Refer back to surgery.   10/22/18 - hemicolectomy              Pathology: pT3 pN1a w/ 1 of 16 LN positive for malignancy. Well to mod differentiated. No tumor deposits. R0 resection.              Primary tumor 1.5cm in size invading to subserosal fat but not to serosa. LVI noted. No PNI noted.   11/12/18  - med onc follow-up  12/3/18: C1D1 FOLFOX  12/5/18 - N/V, diarrhea, "miserable" symptoms per patient  12/6/18 - call with continued nausea, dirrhea better  12/9/18 - call with mucositis symptoms.   12/10/18: Tox check per MD                D8 and he reports multiple complaints and SE from chemo.      Day 1 - jaw pain, redness to feet and hands - this all resolved in 24hrs. also nausea, headache.   Day 2 - vomiting and constipation " "- dulcolax - then diarrhea started on D3  Day 3 - diarrhea severe that AM and took IVF and nausea med IV that helped at that time, this nausea recurred that night.  Day 4 - diarrhea better after imodium  Day 5 - took zofran and pheenrgan 25mg every 5 hours, this helped but not resolved it and needed decadron also.  Day 6 - mouth sores and tongue swelling developed. Felt "colon on fire", would "burp acid", more painful. Called on call doctor and got chlorhexidine  Day 7 - constipation 2 days again so he took MOM and had diarrhea 8 episodes.  12/10/18  D8 and he is feeling much better, he feels this is first improved day but normal. Extreme sensitivity to smell. Still mild nausea, no vomiting. Energy mild better. Pain with port when sleeping.     12/17/18 - C2D1 and reports continues to feel unwell. WBC 1.0k, plts 108k  D10 Started with diarrhea again and took imodium, lomotil, took a few days for relief.   Ativan helping nausea but it will not resolve. Minimal vomiting. Eating only soup. Lost weight. Some pain with swallowing.  New LUE swelling that started after sleeping and has been worsening.   No fever. No mouth sores.  Continues with pain, diffuse body aches and abdomen worse than chronic continuous pain. No blood in stool. Diarrhea is light colored. Urinating is okay.      12/17/18 - U/S LUE - Positive study. Occlusive thrombus within the left internal jugular, subclavian and axillary veins.  lovenox today in chemo and then start xarelto, discuss w/patient in infusion.   12/17/18 - DPD testing - resulted to us ~1/3/18 negative for enzyme deficiency.  12/19/18 - WBC 1.5k, hg 12.8g/dL, plts 118k  12/26/18 - wbc 27.4K, Hg 12.3g/dL, plts 109k  1/7/19 -  presents for follow-up and consider cycle 2 FOLFOX  1/14/19: tox check - feeling much better after dose reductions. still constipation and severe abd pain due to constipation. Neuropathy stable, persistent.  1/22/19 - C3 1800mg/m2 at actual body weight and BSA 2.75 " "  2/5/19 - C4              Feels okay today but only for last 3 days. Prior to this felt unwell from chemo starting on day 6, severe fatigue and severe body aches diffuse, not specific pain in one place. In the bed. He stops dex supp on Saturday which is 5 and attributes the sudden severe symptoms to this. This then lasts a few days of feeling "miserable". No mouth sores with mouth wash, no N/V/D/C which is a huge improvement, constipation is improved due to daily laxative and stool softener not prn. Also right neck pain this cycle but this resolved. Previous had been left. No inc swelling to area of chronic DVT LUE. Urination okay. Ab pain is okay.      Neuropathy continues. Better in hands, continues in feet constant after one cycle of oxali in December and he doesn't feel improving. Labs are good and reviewed with pts, cytopenias have thankfully not recurred.      2/19/19: C5D1 - delayed to 2/26/19  On 2/19/19 - felt very unwell after last cycle with higher dose and this has lasted to today,headache ongoing, back pain, severe fatigue. Neck tightening feeling he got when chemo was "higher dose" before that had resolved and now recurs. He is eating but sometimes hurts to swallow. No fever. No diarrhea. +nausea. Labs are good and reviewed. He has felt quite miserable and wants to go back down to prior dose which is very reasonable. neurontin did help his neuropathy greatly so he is happy about that.      3/12/19 C6D1  Feeling okay today. SE with last cycle did recur but he does feel more tolerable and last shorter duration at this dose compared to last. Pain chest neck head recurred and resolved with pain meds and time. No change.      3/26/19: C7D1 today:   Feeling well today with minimal pain at this time. He states that the dilaudid is helping. He finds he is doing better now that the dose have been reduced on the 5FU MD had advised the port be removed due and a new one placed and pt is refusing at this time.  " "        4/9/19: C8D1 - reports severe fatigue last cycle and nausea which was previously controlled and now recurring despite meds. QUINTERO continues and mild worse. Pain after chemo continues but dilaudid helping greatly so he feels this isn't major issue any longer.      He is reluctant to continue chemo due to feeling "miserable" but wants to continue as well to "get it done". We reviewed labs are good overall.      4/30/19: C9D1- He feels better than he has ever felt and contributes it to the lower 5FU dose and taking decadron scheduled. He had enough energy last weekend to go fishing. Minimal pain. Neuropathy slight, not worsening. Taking gabapentin 300mg at night which is helping to decrease daytime drowsiness.      5/14/19: today is C10D1. Severe fatigue - he skipped IVF because he felt well and now attributes this to feeling unwell this cycle - last cycle was much improved.      5/28/19: C11D1 today. Pt is feeling well today. He states that he had enough energy to go fishing over the weekend and felt like the break he had was much needed.       6/11/19: patient feeling well but contacted by dentist 6/10/10 about abscess tooth and need to have pulled this week. He reports today had it drained 6/10 and on abx with relief of pain. He otherwise is feeling well and scans 6/10/19 show PRIYANK which is great news. He reports continued severe fatigue with chemo that lasts longer each cycle. He is due for last cycle of chemo today but due to dental infection, long discussion about timing of last cycle chemo, omit versus delay, long term plan for surveilancce, risk for recurrence, mgmt of anticoag and DVT/mediport. cancel last cycle.     9/24/19 - today patient presents for follow-up now in surveillance.   He is feeling continually better overall now that he is not on chemotherapy  9/2019 - Cscope - PRIYANK  12/2019-CT chest abdomen pelvis PRIYANK  2/10/20 - CT a/p for acute pain - Fatty liver, Diverticulosis but no diverticulitis, " Few small periportal nodes unchanged. PRIYANK (malig)  3/2020 - cbc, cmp, cea, ferritin, iron profile good  3/23/20 - telemed MD follow-up         I have reviewed my initial consult note with detailed PMH/ROS from initial encounter and all following progress notes.   Past medical, surgical, family, and social history were reviewed today and there are no changes of note unless mentioned in HPI.     MEDS and ALLERGIES were reviewed with patient and meds reconciled.     Fatigue  ***    Weight/appetite  Good    Constitutional  Denies F/C    Pain  Denies    Sleep  Ok    Mucositis  Denies    Cardiovascular  Denies CP    Respiratory  Denies SOB    Nausea  Denies    BMs  Normal    GI  Denies abdominal pain    Bleeding  Denies epistaxis, hemoptysis, BRBPR, melena, hematuria or any other bleeding    Extremities  Denies edema    Skin/nail/hair  Denies toxicity    Neuropathy  Denies    Psych  In good spirits    Misc  n/a    Exercise  ***          Objective:      There were no vitals taken for this visit.  Wt Readings from Last 30 Encounters:   09/24/19 (!) 143.4 kg (316 lb 2.2 oz)   06/26/19 (!) 141 kg (310 lb 12.8 oz)   06/24/19 (!) 140.6 kg (310 lb)   06/21/19 (!) 142.7 kg (314 lb 9.6 oz)   06/19/19 (!) 143.2 kg (315 lb 9.6 oz)   06/11/19 (!) 141.4 kg (311 lb 12.8 oz)   06/05/19 (!) 140.6 kg (310 lb)   06/03/19 (!) 141.6 kg (312 lb 3.2 oz)   05/30/19 (!) 144.2 kg (317 lb 12.8 oz)   05/28/19 (!) 144.2 kg (318 lb)   05/28/19 (!) 143.6 kg (316 lb 9.6 oz)   05/22/19 (!) 144.5 kg (318 lb 9.6 oz)   05/20/19 (!) 143.8 kg (317 lb)   05/14/19 (!) 142.3 kg (313 lb 12.8 oz)   05/02/19 (!) 142.9 kg (315 lb)   04/30/19 (!) 143.6 kg (316 lb 9.6 oz)   04/30/19 (!) 143.1 kg (315 lb 6.4 oz)   04/22/19 (!) 141.3 kg (311 lb 9.6 oz)   04/18/19 (!) 143.7 kg (316 lb 12.8 oz)   04/16/19 (!) 144.4 kg (318 lb 6.4 oz)   04/09/19 (!) 142.1 kg (313 lb 3.2 oz)   04/03/19 (!) 143.6 kg (316 lb 9.6 oz)   04/01/19 (!) 144.2 kg (317 lb 12.8 oz)   03/29/19 (!)  144.7 kg (319 lb)   03/28/19 (!) 146.4 kg (322 lb 12.8 oz)   03/26/19 (!) 144.3 kg (318 lb 3.2 oz)   03/26/19 (!) 143.7 kg (316 lb 12.8 oz)   03/20/19 (!) 145.3 kg (320 lb 6.4 oz)   03/18/19 (!) 144.6 kg (318 lb 12.8 oz)   03/15/19 (!) 144.5 kg (318 lb 9.6 oz)       Constitutional: Patient is oriented, No distress. Calm. Normal speech.   HENT: NCAT, Conjunctivae and EOM are normal.   Neurological: patient is alert and oriented to person, place, and time. No cranial nerve deficit visible.  Skin: no visible rash  Psychiatric: patient has a normal mood and affect. Her speech is normal.       CBC, CMP reviewed, noted are ***  Assessment:       1. Abdominal pain, unspecified abdominal location      Cancer Staging  Adenocarcinoma of colon  Staging form: Colon and Rectum, AJCC 7th Edition  - Clinical stage from 9/26/2017: Stage IIA (T3, N0, M0) - Unsigned  - Clinical: No stage assigned - Unsigned  - Pathologic stage from 11/12/2018: Stage IIIB (T3, N1a, cM0) - Unsigned    48yo male here with diagnosis pT3 pN0 stage II low grade adenocarcinoma of the colon. THI intact. Diagnosed 8/2017. THEN with recurrence 9/2018, pT3 pN1 stage III again low-mod grade adenocarcinoma.     At initial visit:  Discussed diagnosis, work-up, staging and treatment recommendations in great detail with patient and family  Discussed overall low risk features about his pathology, including low grade, T3, no PNI or LVI, 12 negative LN, no obstruction at diagnosis. This is overall good in regards to no indication for adjuvant chemotherapy to improve chance of cure. Recommend go into surveillance.      discussed importance of MSI testing which we would want on any stage II-IV tumor but additionally for hereditary evaluation since he is less than 50 years old and he does have colon cancer in his family. This was negative.     NCCN guidelines suggest H&P and CEA every 3-6 mo for 2 years then every 6 mo for 5 years then yearly thereafter. CT c/a/p every  6-12 mo with preference for every 12 mo for a total of 5 years. Colonoscopy in one year unless there was obstructing lesion at diagnosis - then 3-6 mo out from treatment. PET-CT is not recommended. Also consider aspirin daily to decrease risk of recurrence in colon cancer.      unfortunately 9/2018 recurrence at anastomosis, surgery 10/2018 s/p sigmoidectomy with stage III recurrent disease.      discussed this in great detail, staging, standard of care adjuvant chemo FOLFOX since he did not receive chemo initially, this is per NCCN guidelines to decrease his risk of recurrence going forward. Discussed benefits and risks as well as side effect profile of recommended chemo regimen FOLFOX. Discussed nutrition, exercise, restrictions during chemotherapy.     12/3/18: C1D1 FOLFOX severe side effects with cycle 1 and delayed to January for cycle 2.  Completed 11 cycles by June 2019 with multiple adverse effects, delays, dose reductions     6/11/19: absccess tooth, discussed no chemo today and decision to delay one week so this can be addressed. Discussed CT findings including GGO in lungs and hepatic steatosis. Discussed surveillance plan after this last chemo cycle and risk recurrence. Discussed plan to remove mediport due to DVT notable and would plan to stop anticoag a few months after removal, he does have long term VTE risk but more so during active cancer/chemo/mediport in place. He agrees with having removed so he could stop anticoag. Multiple medical problems to consider overweight, increased BG, liver function, chemo SE, low stamina/strength. He is overall stable today and hopefully these can be addressed/improved as he completes chemo.      He reports colonscopy scheduled in 9/2019, he will try to get mediport removed at same time. He understands risk for recurrence and need for replacement of mediport if that occurs. We discussed his recurrence not evident on scans in past and his CEA did not increase, so we  will continue Q3 month scans, colonoscopy with GI/surgery, and new symptoms/signs should prompt eval asap which is how this recurrence was found when he developed GI symptoms and had a GI eval and colonoscopy.       09/24/2019 he is doing very well and remains clinically PRIYANK based on colonoscopy and CT scans, lab work.  Neuropathy debilitating and severe, discussed management, we will stop Neurontin and start Cymbalta.  Review surveillance plan in great detail due to his history of a recurrence, we will continue close follow-up for now.  Reviewed principles of survivorship, healthy weight and exercise.  Also discussed anticoagulation and prior venous thrombosis, MediPort now removed which is good, 0 symptoms      Today 3/23/20 -   Remains PRIYANK.   Reviewed surveillanec plan - per NCCN -H&P every 3-6 mo for 2y then every 6 mo for a total of 5y. CEA very 3-6mo for 2y, then every 6 mo for a total of 5 y. CT c/a/p every 3-6 mo for 2y, then every 6-12 mo for years 3-5. Colonoscopy done after completion of chemo 9/2010, repeat in 3y then every 5y if no adenoma.     Discussed COVID-19 and social distancing in great detail, avoid all non-essential visits out of the home if possible and avoid sick contacts.         Plan:       Diagnoses and all orders for this visit:    Abdominal pain, unspecified abdominal location  -     HYDROcodone-acetaminophen (NORCO) 5-325 mg per tablet; Take 1 tablet by mouth every 6 (six) hours as needed for Pain.            June or July 2020 CT chest abdomen pelvis for surveillance with labs (cbc, cmp, cea, ferritin, iron profile, testosterone level, A1c) and MD telemedicine visit 1-3 days after for review    norco one fill and will discuss with pelvic wall PT he saw in the past for some exercises to address ab wall pain    Exercise/nutrition important, reviewed.  Advised weight loss, this is improving    Continue current medications, reviewed.      Important to keep follow-up with PCP and  GI/surgery     Discussed plan above in great detail with patient and all questions answered to their satisfaction. Proceed with plan above.          Kinga Lee M.D.  Hematology Oncology  [facility]

## 2020-03-27 DIAGNOSIS — R52 PAIN: ICD-10-CM

## 2020-05-19 ENCOUNTER — PATIENT MESSAGE (OUTPATIENT)
Dept: HEMATOLOGY/ONCOLOGY | Facility: CLINIC | Age: 50
End: 2020-05-19

## 2020-05-19 DIAGNOSIS — R10.9 ABDOMINAL PAIN, UNSPECIFIED ABDOMINAL LOCATION: ICD-10-CM

## 2020-05-19 RX ORDER — HYDROCODONE BITARTRATE AND ACETAMINOPHEN 5; 325 MG/1; MG/1
1 TABLET ORAL EVERY 6 HOURS PRN
Qty: 40 TABLET | Refills: 0 | Status: ON HOLD | OUTPATIENT
Start: 2020-05-19 | End: 2020-12-31 | Stop reason: HOSPADM

## 2020-06-15 ENCOUNTER — TELEPHONE (OUTPATIENT)
Dept: HEMATOLOGY/ONCOLOGY | Facility: CLINIC | Age: 50
End: 2020-06-15

## 2020-06-15 NOTE — TELEPHONE ENCOUNTER
----- Message from [place] sent at 6/15/2020  2:00 PM CDT -----  Contact: Wife/Virginia  Unsuccessful call placed to office.  Virginia called in and stated patient is having severe abdominal pain & dry heaves & would like to speak to nurse as soon as possible to let her know what is going on.    Virginia's call back number is [phone]

## 2020-06-22 ENCOUNTER — TELEPHONE (OUTPATIENT)
Dept: HEMATOLOGY/ONCOLOGY | Facility: CLINIC | Age: 50
End: 2020-06-22

## 2020-06-22 NOTE — TELEPHONE ENCOUNTER
S/w pt re: wanted sooner apt.  Discussed no apt avail at this time but can call if someone cancels.  States it's ok to keep already scheduled apt for next week.

## 2020-07-02 ENCOUNTER — OFFICE VISIT (OUTPATIENT)
Dept: HEMATOLOGY/ONCOLOGY | Facility: CLINIC | Age: 50
End: 2020-07-02
Payer: COMMERCIAL

## 2020-07-02 VITALS
BODY MASS INDEX: 39.53 KG/M2 | WEIGHT: 308 LBS | HEIGHT: 74 IN | OXYGEN SATURATION: 98 % | DIASTOLIC BLOOD PRESSURE: 87 MMHG | RESPIRATION RATE: 20 BRPM | HEART RATE: 86 BPM | SYSTOLIC BLOOD PRESSURE: 138 MMHG | TEMPERATURE: 98 F

## 2020-07-02 DIAGNOSIS — E66.01 CLASS 3 SEVERE OBESITY WITHOUT SERIOUS COMORBIDITY WITH BODY MASS INDEX (BMI) OF 40.0 TO 44.9 IN ADULT, UNSPECIFIED OBESITY TYPE: ICD-10-CM

## 2020-07-02 DIAGNOSIS — R10.9 ABDOMINAL PAIN, UNSPECIFIED ABDOMINAL LOCATION: ICD-10-CM

## 2020-07-02 DIAGNOSIS — C18.9 ADENOCARCINOMA OF COLON: Primary | ICD-10-CM

## 2020-07-02 DIAGNOSIS — F32.A DEPRESSION, UNSPECIFIED DEPRESSION TYPE: ICD-10-CM

## 2020-07-02 DIAGNOSIS — R79.89 LOW TESTOSTERONE: ICD-10-CM

## 2020-07-02 DIAGNOSIS — R53.83 FATIGUE, UNSPECIFIED TYPE: ICD-10-CM

## 2020-07-02 DIAGNOSIS — E16.2 HYPOGLYCEMIA: ICD-10-CM

## 2020-07-02 PROCEDURE — 99999 PR PBB SHADOW E&M-EST. PATIENT-LVL IV: ICD-10-PCS | Mod: PBBFAC,,, | Performed by: INTERNAL MEDICINE

## 2020-07-02 PROCEDURE — 3008F PR BODY MASS INDEX (BMI) DOCUMENTED: ICD-10-PCS | Mod: CPTII,S$GLB,, | Performed by: INTERNAL MEDICINE

## 2020-07-02 PROCEDURE — 99215 OFFICE O/P EST HI 40 MIN: CPT | Mod: S$GLB,,, | Performed by: INTERNAL MEDICINE

## 2020-07-02 PROCEDURE — 99215 PR OFFICE/OUTPT VISIT, EST, LEVL V, 40-54 MIN: ICD-10-PCS | Mod: S$GLB,,, | Performed by: INTERNAL MEDICINE

## 2020-07-02 PROCEDURE — 3008F BODY MASS INDEX DOCD: CPT | Mod: CPTII,S$GLB,, | Performed by: INTERNAL MEDICINE

## 2020-07-02 PROCEDURE — 99999 PR PBB SHADOW E&M-EST. PATIENT-LVL IV: CPT | Mod: PBBFAC,,, | Performed by: INTERNAL MEDICINE

## 2020-07-02 RX ORDER — OXYCODONE AND ACETAMINOPHEN 10; 325 MG/1; MG/1
1 TABLET ORAL 3 TIMES DAILY
COMMUNITY
End: 2021-07-12 | Stop reason: SDUPTHER

## 2020-07-02 NOTE — PROGRESS NOTES
"FOLLOW-UP VISIT    Patient name: Fredis Ugarte  Date: 7/2/20      Subjective:       Patient ID: Fredis Ugarte is a 50 y.o. male.    Chief Complaint: Follow-up    HPI  Last visit 9/24/19, march 2020 visit delayed due to covid pandemic.  9/10/19 - cscope - PRIYANK. Plan is for 3 years per report from his surgeon   2/12/20 - CT a/p - PRIYANK  6/15/20 - ED visit - CT a/p PRIYANK. Ab pain severe that day prompted visit    Today he presents reporting he feels "terrible"and this has been a change and started in February 2020.   Severe fatigue. Cannot do normal activities, exhausting. Feels he's sleeping well at night and does not snore per wife nor wake up frequently. His DM meds were stopped recently, A1C good and told no longer needs them. Now reports Low BG problem. Messaged his endocrinologist about this today.     Abdominal pain is severe and frequent and not associated with BM or meals. Spasm that start in LUQ and radiate to rectum. Severe. pain abdominal cannot tell when need urination stool. Needs laxatives daily and stool softeners as well to have BM.     He reports stopped pain meds in February and did not want to continue on this, some drowsiness and doesn't want to continue on pain meds.     Reports knows his testosterone is low and told replacement to be considered after sleep study, he couldn't get thsi scheduled for another 3-4 weeks.     See detailed oncology history below, updated with most recent scans, labs, pertinent medical history.   Oncology History   Adenocarcinoma of colon   9/26/2017 Initial Diagnosis    Adenocarcinoma of colon    Oncologic history:  5/2017 - passing blood for 3 mo - BRB. Occasionally difficult with BM, diarrhea, constipation which was a change. No ab pain.   8/1/17 - CT a/p - Circumferential mass sigmoid colon causing significant luminal narrowing measuring approximately 2.6 X 2.8cm highly suggestive of malignancy with no evidence of metastatic disease to abdomen or pelvis.   8/1/17 he " "underwent colonoscopy that confirmed lesion at 20cm endophytic and ulcerated, 1/2 of colon lumen.   8/2/17 - hemicolectomy -                      Pathology: low grade adenocarcinoma invading through muscularis propria into subserosal adipose tissue, neg margins, no LVI or PNI, no tumor deposits. 12 LN negative for tumor. Final pathologic stage pT3 pN0 - stage II. THI, intact.  8/10/17 CBC shows Hg 12.1g/dL, CMP noted and unremarkable. CEA is 2.2.  9/2017 - post-operative infections, required inpatient and outpatient I&D at incision site  9/27/17 - initial med onc eval  9/27/17 - CEA<0.3, ferritin 251, CBC, CMP good  10/27/17 - CT chest - PRIYANK (to complete staging)  12/18/17 - CEA <0.3, ferritin 163. CBC/CMP good  4/16/18 - CT C/A/P - PRIYANK, ferritin >100, CEA <0.3, Hg 16.3g/dL, CMP and CBC WNL  8/20/18- CEA 0.6  , ferritin. BM abnormal, referral back to GI.      9/27/18 - colonoscopy - biopsy of ulcer at anastomosis - pathology consis with adenocarcinoma  10/8/18 - CT c/a/p - PRIYANK   10/9/18 - med onc follow-up. Refer back to surgery.   10/22/18 - hemicolectomy              Pathology: pT3 pN1a w/ 1 of 16 LN positive for malignancy. Well to mod differentiated. No tumor deposits. R0 resection.              Primary tumor 1.5cm in size invading to subserosal fat but not to serosa. LVI noted. No PNI noted.   11/12/18  - med onc follow-up  12/3/18: C1D1 FOLFOX  12/5/18 - N/V, diarrhea, "miserable" symptoms per patient  12/6/18 - call with continued nausea, dirrhea better  12/9/18 - call with mucositis symptoms.   12/10/18: Tox check per MD                D8 and he reports multiple complaints and SE from chemo.      Day 1 - jaw pain, redness to feet and hands - this all resolved in 24hrs. also nausea, headache.   Day 2 - vomiting and constipation - dulcolax - then diarrhea started on D3  Day 3 - diarrhea severe that AM and took IVF and nausea med IV that helped at that time, this nausea recurred that night.  Day 4 - diarrhea " "better after imodium  Day 5 - took zofran and pheenrgan 25mg every 5 hours, this helped but not resolved it and needed decadron also.  Day 6 - mouth sores and tongue swelling developed. Felt "colon on fire", would "burp acid", more painful. Called on call doctor and got chlorhexidine  Day 7 - constipation 2 days again so he took MOM and had diarrhea 8 episodes.  12/10/18  D8 and he is feeling much better, he feels this is first improved day but normal. Extreme sensitivity to smell. Still mild nausea, no vomiting. Energy mild better. Pain with port when sleeping.     12/17/18 - C2D1 and reports continues to feel unwell. WBC 1.0k, plts 108k  D10 Started with diarrhea again and took imodium, lomotil, took a few days for relief.   Ativan helping nausea but it will not resolve. Minimal vomiting. Eating only soup. Lost weight. Some pain with swallowing.  New LUE swelling that started after sleeping and has been worsening.   No fever. No mouth sores.  Continues with pain, diffuse body aches and abdomen worse than chronic continuous pain. No blood in stool. Diarrhea is light colored. Urinating is okay.      12/17/18 - U/S LUE - Positive study. Occlusive thrombus within the left internal jugular, subclavian and axillary veins.  lovenox today in chemo and then start xarelto, discuss w/patient in infusion.   12/17/18 - DPD testing - resulted to us ~1/3/18 negative for enzyme deficiency.  12/19/18 - WBC 1.5k, hg 12.8g/dL, plts 118k  12/26/18 - wbc 27.4K, Hg 12.3g/dL, plts 109k  1/7/19 -  presents for follow-up and consider cycle 2 FOLFOX  1/14/19: tox check - feeling much better after dose reductions. still constipation and severe abd pain due to constipation. Neuropathy stable, persistent.  1/22/19 - C3 1800mg/m2 at actual body weight and BSA 2.75   2/5/19 - C4              Feels okay today but only for last 3 days. Prior to this felt unwell from chemo starting on day 6, severe fatigue and severe body aches diffuse, not " "specific pain in one place. In the bed. He stops dex supp on Saturday which is 5 and attributes the sudden severe symptoms to this. This then lasts a few days of feeling "miserable". No mouth sores with mouth wash, no N/V/D/C which is a huge improvement, constipation is improved due to daily laxative and stool softener not prn. Also right neck pain this cycle but this resolved. Previous had been left. No inc swelling to area of chronic DVT LUE. Urination okay. Ab pain is okay.      Neuropathy continues. Better in hands, continues in feet constant after one cycle of oxali in December and he doesn't feel improving. Labs are good and reviewed with pts, cytopenias have thankfully not recurred.      2/19/19: C5D1 - delayed to 2/26/19  On 2/19/19 - felt very unwell after last cycle with higher dose and this has lasted to today,headache ongoing, back pain, severe fatigue. Neck tightening feeling he got when chemo was "higher dose" before that had resolved and now recurs. He is eating but sometimes hurts to swallow. No fever. No diarrhea. +nausea. Labs are good and reviewed. He has felt quite miserable and wants to go back down to prior dose which is very reasonable. neurontin did help his neuropathy greatly so he is happy about that.      3/12/19 C6D1  Feeling okay today. SE with last cycle did recur but he does feel more tolerable and last shorter duration at this dose compared to last. Pain chest neck head recurred and resolved with pain meds and time. No change.      3/26/19: C7D1 today:   Feeling well today with minimal pain at this time. He states that the dilaudid is helping. He finds he is doing better now that the dose have been reduced on the 5FU MD had advised the port be removed due and a new one placed and pt is refusing at this time.          4/9/19: C8D1 - reports severe fatigue last cycle and nausea which was previously controlled and now recurring despite meds. QUINTERO continues and mild worse. Pain after chemo " "continues but dilaudid helping greatly so he feels this isn't major issue any longer.      He is reluctant to continue chemo due to feeling "miserable" but wants to continue as well to "get it done". We reviewed labs are good overall.      4/30/19: C9D1- He feels better than he has ever felt and contributes it to the lower 5FU dose and taking decadron scheduled. He had enough energy last weekend to go fishing. Minimal pain. Neuropathy slight, not worsening. Taking gabapentin 300mg at night which is helping to decrease daytime drowsiness.      5/14/19: today is C10D1. Severe fatigue - he skipped IVF because he felt well and now attributes this to feeling unwell this cycle - last cycle was much improved.      5/28/19: C11D1 today. Pt is feeling well today. He states that he had enough energy to go fishing over the weekend and felt like the break he had was much needed.       6/11/19: patient feeling well but contacted by dentist 6/10/10 about abscess tooth and need to have pulled this week. He reports today had it drained 6/10 and on abx with relief of pain. He otherwise is feeling well and scans 6/10/19 show PRIYANK which is great news. He reports continued severe fatigue with chemo that lasts longer each cycle. He is due for last cycle of chemo today but due to dental infection, long discussion about timing of last cycle chemo, omit versus delay, long term plan for surveilancce, risk for recurrence, mgmt of anticoag and DVT/mediport. cancel last cycle.     9/24/19 - today patient presents for follow-up now in surveillance.   He is feeling continually better overall now that he is not on chemotherapy  9/2019 - Cscope - PRIYANK  12/2019-CT chest abdomen pelvis PRIYANK  2/10/20 - CT a/p for acute pain - Fatty liver, Diverticulosis but no diverticulitis, Few small periportal nodes unchanged. PRIYANK (malig)  3/2020 - cbc, cmp, cea, ferritin, iron profile good  3/23/20 - telemed MD follow-up         I have reviewed my initial consult " "note with detailed PMH/ROS from initial encounter and all following progress notes.   Past medical, surgical, family, and social history were reviewed today and there are no changes of note unless mentioned in HPI.     MEDS and ALLERGIES were reviewed with patient and meds reconciled.     Fatigue  severe    Weight/appetite  He was losing which is good. Less so recent.    Constitutional  Denies F/C    Pain  Yes see hpI   Sleep  Ok    Mucositis  Denies    Cardiovascular  Denies CP    Respiratory  Denies SOB    Nausea  Denies    BMs  constipation   GI  Pos abdominal pain    Bleeding  Denies epistaxis, hemoptysis, BRBPR, melena, hematuria or any other bleeding    Extremities  Denies edema    Skin/nail/hair  Denies toxicity    Neuropathy  yes   Psych  depressed   Misc  n/a    Exercise  none          Objective:      /87 (BP Location: Right arm, Patient Position: Sitting, BP Method: Large (Automatic))   Pulse 86   Temp 98 °F (36.7 °C) (Temporal)   Resp 20   Ht 6' 2" (1.88 m)   Wt (!) 139.7 kg (307 lb 15.7 oz)   SpO2 98%   BMI 39.54 kg/m²   Wt Readings from Last 30 Encounters:   07/02/20 (!) 139.7 kg (307 lb 15.7 oz)   09/24/19 (!) 143.4 kg (316 lb 2.2 oz)   06/26/19 (!) 141 kg (310 lb 12.8 oz)   06/24/19 (!) 140.6 kg (310 lb)   06/21/19 (!) 142.7 kg (314 lb 9.6 oz)   06/19/19 (!) 143.2 kg (315 lb 9.6 oz)   06/11/19 (!) 141.4 kg (311 lb 12.8 oz)   06/05/19 (!) 140.6 kg (310 lb)   06/03/19 (!) 141.6 kg (312 lb 3.2 oz)   05/30/19 (!) 144.2 kg (317 lb 12.8 oz)   05/28/19 (!) 144.2 kg (318 lb)   05/28/19 (!) 143.6 kg (316 lb 9.6 oz)   05/22/19 (!) 144.5 kg (318 lb 9.6 oz)   05/20/19 (!) 143.8 kg (317 lb)   05/14/19 (!) 142.3 kg (313 lb 12.8 oz)   05/02/19 (!) 142.9 kg (315 lb)   04/30/19 (!) 143.6 kg (316 lb 9.6 oz)   04/30/19 (!) 143.1 kg (315 lb 6.4 oz)   04/22/19 (!) 141.3 kg (311 lb 9.6 oz)   04/18/19 (!) 143.7 kg (316 lb 12.8 oz)   04/16/19 (!) 144.4 kg (318 lb 6.4 oz)   04/09/19 (!) 142.1 kg (313 lb 3.2 oz) "   04/03/19 (!) 143.6 kg (316 lb 9.6 oz)   04/01/19 (!) 144.2 kg (317 lb 12.8 oz)   03/29/19 (!) 144.7 kg (319 lb)   03/28/19 (!) 146.4 kg (322 lb 12.8 oz)   03/26/19 (!) 144.3 kg (318 lb 3.2 oz)   03/26/19 (!) 143.7 kg (316 lb 12.8 oz)   03/20/19 (!) 145.3 kg (320 lb 6.4 oz)   03/18/19 (!) 144.6 kg (318 lb 12.8 oz)       Constitutional: Patient is oriented to person, place, and time. No distress. He does appear depressed  HENT: Mouth/Throat: Oropharynx is clear and moist. No oropharyngeal exudate.   Eyes: Conjunctivae and EOM are normal.   Neck: Normal range of motion. Neck supple.   Cardiovascular: Normal rate, regular rhythm, normal heart sounds and intact distal pulses.    Pulmonary/Chest: Effort normal and breath sounds normal. no wheezes. no rales.    Abdominal: Soft. Bowel sounds are normal. Patient exhibits no distension. There is +LUQ tenderness.   Musculoskeletal: patient exhibits no edema or tenderness.   Lymphadenopathy:  patient  has no cervical adenopathy. no supraclavicular adenopathy. No axillary LAD.   Neurological: Patient is alert and oriented to person, place, and time. normal strength. No cranial nerve deficit or sensory deficit.   Skin: Skin is warm and dry. No rash noted. No erythema.   Psychiatric: Patient has a depressed mood and affect. speech is normal.   Nursing note and vitals reviewed.      CBC, CMP reviewed, noted are overall good.   Assessment:       1. Adenocarcinoma of colon    2. Abdominal pain, unspecified abdominal location    3. Class 3 severe obesity without serious comorbidity with body mass index (BMI) of 40.0 to 44.9 in adult, unspecified obesity type    4. Fatigue, unspecified type      Cancer Staging  Adenocarcinoma of colon  Staging form: Colon and Rectum, AJCC 7th Edition  - Clinical stage from 9/26/2017: Stage IIA (T3, N0, M0) - Unsigned  - Clinical: No stage assigned - Unsigned  - Pathologic stage from 11/12/2018: Stage IIIB (T3, N1a, cM0) - Unsigned      ECOG  1-2    49yo male with history of stage II colon cancer then had recurrence the following year, stage III, 2018.    Multiple side effects/complications with chemo and completed this 5/2019, see history above. Now continues with fatigue, abdominal pain worsened recently, neuropathy, obesity, becoming depressed suffering with these symptoms, see HPI. Discussed at length and unsure of etiology of his abdominal pain. As far as his colon cancer, he remains clinically PRIYANK based on labs and CT scan. He will continue with close surveillance with his surgeon and colonoscopies, see below.         Plan:       Fredis was seen today for follow-up.    Diagnoses and all orders for this visit:    Adenocarcinoma of colon    Abdominal pain, unspecified abdominal location    Class 3 severe obesity without serious comorbidity with body mass index (BMI) of 40.0 to 44.9 in adult, unspecified obesity type    Fatigue, unspecified type            He will continue MD vis for surveillance here in 4 months with CT c/a/p and cea, cbc, cmp, please schedule.     I plan to consult with GI colleague and pelvic floor PT again to better know how to address his abdominal pain    He will contact his PCP/urology to continue follow-up on the low testosterone    He will contact his endocrinologist about hypoglycemia and I have informed his endo today also, appreciate their help.     He will contact his surgeon to consider colonoscopy earlier since his colon ca recurrence was diagnosed via scope with ab pain and GI symptoms and scans negative. Directed him to do this soon.     Exercise/nutrition important, reviewed    Continue current medications, reviewed.        Discussed plan above in great detail with patient and all questions answered to their satisfaction. Proceed with plan above. Greater than 50% of visit today was spent in discussing condition and continued treatment plan, counseling, education, for >40 minutes.            Kinga Lee,  M.D.  Hematology Oncology  [facility]  [facility]

## 2020-07-06 DIAGNOSIS — R10.9 ABDOMINAL PAIN, UNSPECIFIED ABDOMINAL LOCATION: ICD-10-CM

## 2020-07-06 DIAGNOSIS — C18.9 ADENOCARCINOMA OF COLON: Primary | ICD-10-CM

## 2020-07-15 ENCOUNTER — TELEPHONE (OUTPATIENT)
Dept: GASTROENTEROLOGY | Facility: CLINIC | Age: 50
End: 2020-07-15

## 2020-07-15 NOTE — TELEPHONE ENCOUNTER
MA spoke to pt,     Pt will like to be scheduled sooner than September. Referral placed by Dr. Lee and records are in media.     MA offered pt appt on 8/10/20 at 9 am with Dr. Woodson     Pt confirmed appt and thank MA     Message routed to Debbie to schedule appt

## 2020-07-21 ENCOUNTER — TELEPHONE (OUTPATIENT)
Dept: HEMATOLOGY/ONCOLOGY | Facility: CLINIC | Age: 50
End: 2020-07-21

## 2020-07-21 NOTE — TELEPHONE ENCOUNTER
----- Message from Princess BIENVENIDO Calderon sent at 7/21/2020 11:43 AM CDT -----  Contact: Marialuisa Ugarte (Spouse)  Type: Needs Medical Advice  Who Called:  Marialuisa Ugarte (Spouse)  Best Call Back Number: [phone] (home)     Additional Information: patient is needing a new order for [facility]. Please advise

## 2020-07-31 ENCOUNTER — TELEPHONE (OUTPATIENT)
Dept: HEMATOLOGY/ONCOLOGY | Facility: CLINIC | Age: 50
End: 2020-07-31

## 2020-10-01 ENCOUNTER — PATIENT MESSAGE (OUTPATIENT)
Dept: HEMATOLOGY/ONCOLOGY | Facility: CLINIC | Age: 50
End: 2020-10-01

## 2020-10-07 ENCOUNTER — OFFICE VISIT (OUTPATIENT)
Dept: HEMATOLOGY/ONCOLOGY | Facility: CLINIC | Age: 50
End: 2020-10-07
Payer: COMMERCIAL

## 2020-10-07 VITALS
TEMPERATURE: 98 F | HEART RATE: 75 BPM | SYSTOLIC BLOOD PRESSURE: 137 MMHG | RESPIRATION RATE: 20 BRPM | OXYGEN SATURATION: 95 % | WEIGHT: 315 LBS | HEIGHT: 74 IN | DIASTOLIC BLOOD PRESSURE: 84 MMHG | BODY MASS INDEX: 40.43 KG/M2

## 2020-10-07 DIAGNOSIS — F32.A DEPRESSION, UNSPECIFIED DEPRESSION TYPE: ICD-10-CM

## 2020-10-07 DIAGNOSIS — M62.838 MUSCLE SPASM: ICD-10-CM

## 2020-10-07 DIAGNOSIS — E66.01 CLASS 3 SEVERE OBESITY WITHOUT SERIOUS COMORBIDITY WITH BODY MASS INDEX (BMI) OF 40.0 TO 44.9 IN ADULT, UNSPECIFIED OBESITY TYPE: ICD-10-CM

## 2020-10-07 DIAGNOSIS — C18.9 ADENOCARCINOMA OF COLON: Primary | ICD-10-CM

## 2020-10-07 DIAGNOSIS — R53.83 FATIGUE, UNSPECIFIED TYPE: ICD-10-CM

## 2020-10-07 PROCEDURE — 3008F PR BODY MASS INDEX (BMI) DOCUMENTED: ICD-10-PCS | Mod: CPTII,S$GLB,, | Performed by: INTERNAL MEDICINE

## 2020-10-07 PROCEDURE — 99999 PR PBB SHADOW E&M-EST. PATIENT-LVL IV: ICD-10-PCS | Mod: PBBFAC,,, | Performed by: INTERNAL MEDICINE

## 2020-10-07 PROCEDURE — 3008F BODY MASS INDEX DOCD: CPT | Mod: CPTII,S$GLB,, | Performed by: INTERNAL MEDICINE

## 2020-10-07 PROCEDURE — 99215 PR OFFICE/OUTPT VISIT, EST, LEVL V, 40-54 MIN: ICD-10-PCS | Mod: S$GLB,,, | Performed by: INTERNAL MEDICINE

## 2020-10-07 PROCEDURE — 99999 PR PBB SHADOW E&M-EST. PATIENT-LVL IV: CPT | Mod: PBBFAC,,, | Performed by: INTERNAL MEDICINE

## 2020-10-07 PROCEDURE — 99215 OFFICE O/P EST HI 40 MIN: CPT | Mod: S$GLB,,, | Performed by: INTERNAL MEDICINE

## 2020-10-07 RX ORDER — CYCLOBENZAPRINE HCL 5 MG
10 TABLET ORAL 3 TIMES DAILY PRN
Qty: 60 TABLET | Refills: 3 | Status: SHIPPED | OUTPATIENT
Start: 2020-10-07 | End: 2020-10-17

## 2020-10-07 NOTE — PROGRESS NOTES
"FOLLOW-UP VISIT    Patient name: Fredis Ugarte  Date: 10/7/20      Subjective:       Patient ID: Fredis Ugarte is a 50 y.o. male.    Chief Complaint: Follow-up    HPI  Presents today for colon cancer surveillance  Scans PRIAYNK    Continues very severe pain, debilitating daily pain with movement, sitting, cant sleep through night, cannot be active, cannot maintain BMs without pain. Suppositories have helped. Pelvic floor PT has helped but he has trouble tolerating this.   Pain mgmt - has been recommended for pain stimulator, wasn't approved. He is going to consider disability.     Tried flexeril and sedating but helpful. Will start this.    8/19/2020 c-scope with Dr. Denny negative.     Testosterone was 170 and on replacement and he is feeling "much better", his level is now 400's and he is glad about this. Injected at his PCP. Dr. Toro is prescribing his testosterone, sees again in November 2020        See detailed oncology history below, updated with most recent scans, labs, pertinent medical history.   Oncology History   Adenocarcinoma of colon   9/26/2017 Initial Diagnosis    Adenocarcinoma of colon    Oncologic history:  5/2017 - passing blood for 3 mo - BRB. Occasionally difficult with BM, diarrhea, constipation which was a change. No ab pain.   8/1/17 - CT a/p - Circumferential mass sigmoid colon causing significant luminal narrowing measuring approximately 2.6 X 2.8cm highly suggestive of malignancy with no evidence of metastatic disease to abdomen or pelvis.   8/1/17 he underwent colonoscopy that confirmed lesion at 20cm endophytic and ulcerated, 1/2 of colon lumen.   8/2/17 - hemicolectomy -                      Pathology: low grade adenocarcinoma invading through muscularis propria into subserosal adipose tissue, neg margins, no LVI or PNI, no tumor deposits. 12 LN negative for tumor. Final pathologic stage pT3 pN0 - stage II. THI, intact.  8/10/17 CBC shows Hg 12.1g/dL, CMP noted and unremarkable. " "CEA is 2.2.  9/2017 - post-operative infections, required inpatient and outpatient I&D at incision site  9/27/17 - initial med onc eval  9/27/17 - CEA<0.3, ferritin 251, CBC, CMP good  10/27/17 - CT chest - PRIYANK (to complete staging)  12/18/17 - CEA <0.3, ferritin 163. CBC/CMP good  4/16/18 - CT C/A/P - PRIYANK, ferritin >100, CEA <0.3, Hg 16.3g/dL, CMP and CBC WNL  8/20/18- CEA 0.6  , ferritin. BM abnormal, referral back to GI.      9/27/18 - colonoscopy - biopsy of ulcer at anastomosis - pathology consis with adenocarcinoma  10/8/18 - CT c/a/p - PRIYANK   10/9/18 - med onc follow-up. Refer back to surgery.   10/22/18 - hemicolectomy              Pathology: pT3 pN1a w/ 1 of 16 LN positive for malignancy. Well to mod differentiated. No tumor deposits. R0 resection.              Primary tumor 1.5cm in size invading to subserosal fat but not to serosa. LVI noted. No PNI noted.   11/12/18  - med onc follow-up  12/3/18: C1D1 FOLFOX  12/5/18 - N/V, diarrhea, "miserable" symptoms per patient  12/6/18 - call with continued nausea, dirrhea better  12/9/18 - call with mucositis symptoms.   12/10/18: Tox check per MD                D8 and he reports multiple complaints and SE from chemo.      Day 1 - jaw pain, redness to feet and hands - this all resolved in 24hrs. also nausea, headache.   Day 2 - vomiting and constipation - dulcolax - then diarrhea started on D3  Day 3 - diarrhea severe that AM and took IVF and nausea med IV that helped at that time, this nausea recurred that night.  Day 4 - diarrhea better after imodium  Day 5 - took zofran and pheenrgan 25mg every 5 hours, this helped but not resolved it and needed decadron also.  Day 6 - mouth sores and tongue swelling developed. Felt "colon on fire", would "burp acid", more painful. Called on call doctor and got chlorhexidine  Day 7 - constipation 2 days again so he took MOM and had diarrhea 8 episodes.  12/10/18  D8 and he is feeling much better, he feels this is first improved " "day but normal. Extreme sensitivity to smell. Still mild nausea, no vomiting. Energy mild better. Pain with port when sleeping.     12/17/18 - C2D1 and reports continues to feel unwell. WBC 1.0k, plts 108k  D10 Started with diarrhea again and took imodium, lomotil, took a few days for relief.   Ativan helping nausea but it will not resolve. Minimal vomiting. Eating only soup. Lost weight. Some pain with swallowing.  New LUE swelling that started after sleeping and has been worsening.   No fever. No mouth sores.  Continues with pain, diffuse body aches and abdomen worse than chronic continuous pain. No blood in stool. Diarrhea is light colored. Urinating is okay.      12/17/18 - U/S LUE - Positive study. Occlusive thrombus within the left internal jugular, subclavian and axillary veins.  lovenox today in chemo and then start xarelto, discuss w/patient in infusion.   12/17/18 - DPD testing - resulted to us ~1/3/18 negative for enzyme deficiency.  12/19/18 - WBC 1.5k, hg 12.8g/dL, plts 118k  12/26/18 - wbc 27.4K, Hg 12.3g/dL, plts 109k  1/7/19 -  presents for follow-up and consider cycle 2 FOLFOX  1/14/19: tox check - feeling much better after dose reductions. still constipation and severe abd pain due to constipation. Neuropathy stable, persistent.  1/22/19 - C3 1800mg/m2 at actual body weight and BSA 2.75   2/5/19 - C4              Feels okay today but only for last 3 days. Prior to this felt unwell from chemo starting on day 6, severe fatigue and severe body aches diffuse, not specific pain in one place. In the bed. He stops dex supp on Saturday which is 5 and attributes the sudden severe symptoms to this. This then lasts a few days of feeling "miserable". No mouth sores with mouth wash, no N/V/D/C which is a huge improvement, constipation is improved due to daily laxative and stool softener not prn. Also right neck pain this cycle but this resolved. Previous had been left. No inc swelling to area of chronic DVT " "LUE. Urination okay. Ab pain is okay.      Neuropathy continues. Better in hands, continues in feet constant after one cycle of oxali in December and he doesn't feel improving. Labs are good and reviewed with pts, cytopenias have thankfully not recurred.      2/19/19: C5D1 - delayed to 2/26/19  On 2/19/19 - felt very unwell after last cycle with higher dose and this has lasted to today,headache ongoing, back pain, severe fatigue. Neck tightening feeling he got when chemo was "higher dose" before that had resolved and now recurs. He is eating but sometimes hurts to swallow. No fever. No diarrhea. +nausea. Labs are good and reviewed. He has felt quite miserable and wants to go back down to prior dose which is very reasonable. neurontin did help his neuropathy greatly so he is happy about that.      3/12/19 C6D1  Feeling okay today. SE with last cycle did recur but he does feel more tolerable and last shorter duration at this dose compared to last. Pain chest neck head recurred and resolved with pain meds and time. No change.      3/26/19: C7D1 today:   Feeling well today with minimal pain at this time. He states that the dilaudid is helping. He finds he is doing better now that the dose have been reduced on the 5FU MD had advised the port be removed due and a new one placed and pt is refusing at this time.          4/9/19: C8D1 - reports severe fatigue last cycle and nausea which was previously controlled and now recurring despite meds. QUINTERO continues and mild worse. Pain after chemo continues but dilaudid helping greatly so he feels this isn't major issue any longer.      He is reluctant to continue chemo due to feeling "miserable" but wants to continue as well to "get it done". We reviewed labs are good overall.      4/30/19: C9D1- He feels better than he has ever felt and contributes it to the lower 5FU dose and taking decadron scheduled. He had enough energy last weekend to go fishing. Minimal pain. Neuropathy " slight, not worsening. Taking gabapentin 300mg at night which is helping to decrease daytime drowsiness.      5/14/19: today is C10D1. Severe fatigue - he skipped IVF because he felt well and now attributes this to feeling unwell this cycle - last cycle was much improved.      5/28/19: C11D1 today. Pt is feeling well today. He states that he had enough energy to go fishing over the weekend and felt like the break he had was much needed.       6/11/19: patient feeling well but contacted by dentist 6/10/10 about abscess tooth and need to have pulled this week. He reports today had it drained 6/10 and on abx with relief of pain. He otherwise is feeling well and scans 6/10/19 show PRIYANK which is great news. He reports continued severe fatigue with chemo that lasts longer each cycle. He is due for last cycle of chemo today but due to dental infection, long discussion about timing of last cycle chemo, omit versus delay, long term plan for surveilancce, risk for recurrence, mgmt of anticoag and DVT/mediport. cancel last cycle.     9/24/19 - today patient presents for follow-up now in surveillance.   He is feeling continually better overall now that he is not on chemotherapy  9/2019 - Cscope - PRIYANK  12/2019-CT chest abdomen pelvis PRIYANK  2/10/20 - CT a/p for acute pain - Fatty liver, Diverticulosis but no diverticulitis, Few small periportal nodes unchanged. PRIYANK (malig)  3/2020 - cbc, cmp, cea, ferritin, iron profile good  3/23/20 - telemed MD follow-up  7/1/20 - ferritin WNL, iron profile WNL, A1c is 6.2%, testosterone low 170ng/mL  9/29/20 - CT ca//ap - PRIYANK. Cbc, cmp, cea good.   10/5/20 - message from PT, patient continues with severe abdominal/pelvic pain, physical therapy has helped, suppositories have helped.   10/7/20 - med onc followup          I have reviewed my initial consult note with detailed PMH/ROS from initial encounter and all following progress notes.   Past medical, surgical, family, and social history were  "reviewed today and there are no changes of note unless mentioned in HPI.     MEDS and ALLERGIES were reviewed with patient and meds reconciled.     Fatigue  stable    Weight/appetite  stable    Constitutional  no F/C or SOI   Pain  severe    Cardiovascular  no change in CP or palpitations   Respiratory  no change in SOB or cough   GI  No new abdominal pain or major change in BM   Bleeding  No new bleeding   Extremities  No new edema           Objective:      /84 (BP Location: Right arm, Patient Position: Sitting, BP Method: Large (Automatic))   Pulse 75   Temp 97.6 °F (36.4 °C) (Temporal)   Resp 20   Ht 6' 2" (1.88 m)   Wt (!) 147.7 kg (325 lb 9.9 oz)   SpO2 95%   BMI 41.81 kg/m²   Wt Readings from Last 30 Encounters:   10/07/20 (!) 147.7 kg (325 lb 9.9 oz)   07/02/20 (!) 139.7 kg (307 lb 15.7 oz)   09/24/19 (!) 143.4 kg (316 lb 2.2 oz)   06/26/19 (!) 141 kg (310 lb 12.8 oz)   06/24/19 (!) 140.6 kg (310 lb)   06/21/19 (!) 142.7 kg (314 lb 9.6 oz)   06/19/19 (!) 143.2 kg (315 lb 9.6 oz)   06/11/19 (!) 141.4 kg (311 lb 12.8 oz)   06/05/19 (!) 140.6 kg (310 lb)   06/03/19 (!) 141.6 kg (312 lb 3.2 oz)   05/30/19 (!) 144.2 kg (317 lb 12.8 oz)   05/28/19 (!) 144.2 kg (318 lb)   05/28/19 (!) 143.6 kg (316 lb 9.6 oz)   05/22/19 (!) 144.5 kg (318 lb 9.6 oz)   05/20/19 (!) 143.8 kg (317 lb)   05/14/19 (!) 142.3 kg (313 lb 12.8 oz)   05/02/19 (!) 142.9 kg (315 lb)   04/30/19 (!) 143.6 kg (316 lb 9.6 oz)   04/30/19 (!) 143.1 kg (315 lb 6.4 oz)   04/22/19 (!) 141.3 kg (311 lb 9.6 oz)   04/18/19 (!) 143.7 kg (316 lb 12.8 oz)   04/16/19 (!) 144.4 kg (318 lb 6.4 oz)   04/09/19 (!) 142.1 kg (313 lb 3.2 oz)   04/03/19 (!) 143.6 kg (316 lb 9.6 oz)   04/01/19 (!) 144.2 kg (317 lb 12.8 oz)   03/29/19 (!) 144.7 kg (319 lb)   03/28/19 (!) 146.4 kg (322 lb 12.8 oz)   03/26/19 (!) 144.3 kg (318 lb 3.2 oz)   03/26/19 (!) 143.7 kg (316 lb 12.8 oz)   03/20/19 (!) 145.3 kg (320 lb 6.4 oz)       Constitutional: Patient is oriented " to person, place, and time. No distress. He has to stand during interview, sitting is painful.   HENT:  Eyes: Conjunctivae and EOM are normal.   Neck: Normal range of motion. Neck supple.   Cardiovascular: Normal rate, regular rhythm, normal heart sounds and intact distal pulses.    Pulmonary/Chest: Effort normal and breath sounds normal. no wheezes. no rales.    Abdominal: Soft. Bowel sounds are normal. Patient exhibits no distension. There is +tenderness.   Musculoskeletal: patient exhibits no edema or tenderness.   Lymphadenopathy:  patient  has no cervical adenopathy. no supraclavicular adenopathy. No axillary LAD.   Neurological: Patient is alert and oriented to person, place, and time. normal strength. No cranial nerve deficit or sensory deficit.   Skin: Skin is warm and dry. No rash noted. No erythema.   Psychiatric: Patient has a normal mood and affect. speech is normal.   Nursing note and vitals reviewed.    LABS good    Assessment:       1. Adenocarcinoma of colon    2. Muscle spasm    3. Class 3 severe obesity without serious comorbidity with body mass index (BMI) of 40.0 to 44.9 in adult, unspecified obesity type    4. Fatigue, unspecified type    5. Depression, unspecified depression type      Cancer Staging  Adenocarcinoma of colon  Staging form: Colon and Rectum, AJCC 7th Edition  - Clinical stage from 9/26/2017: Stage IIA (T3, N0, M0) - Unsigned  - Clinical: No stage assigned - Unsigned  - Pathologic stage from 11/12/2018: Stage IIIB (T3, N1a, cM0) - Unsigned      ECOG 1-2     49yo male with history of stage II colon cancer 2017 then had recurrence the following year, stage III, 2018.     Multiple side effects/complications with chemo and completed this 5/2019, see history above. Now continues with fatigue, abdominal pain worsened recently, neuropathy, obesity, depressed suffering with these symptoms, this has persisted since beginning of 2020. Some improvement with suppositories frequently, physical  therapy, dependent on pain meds. Will eval other therapies that may be of benefit (anyone using pelvic floor botox as rec'd by PT, CBD or medical marijuana being considered)     As far as his colon cancer, he remains clinically PRIYANK based on labs and CT scan. He will continue with close surveillance with his surgeon and colonoscopies, see below.              Plan:       Fredis was seen today for follow-up.    Diagnoses and all orders for this visit:    Adenocarcinoma of colon    Muscle spasm  -     cyclobenzaprine (FLEXERIL) 5 MG tablet; Take 2 tablets (10 mg total) by mouth 3 (three) times daily as needed for Muscle spasms.    Class 3 severe obesity without serious comorbidity with body mass index (BMI) of 40.0 to 44.9 in adult, unspecified obesity type    Fatigue, unspecified type    Depression, unspecified depression type            CT scans c/a/p and labs cbc, cmp, cea, ferritin, iron profile in February 2021    MD vis 1 week after.    [facility] - please call to compound the suppositories and need to increase to TID.      Flexeril to  Pharm  He will consider CBD     Needs pain mgmt, will eval    Exercise/nutrition important, reviewed    Continue current medications, reviewed.      Important to keep follow-up with PCP and GI and pain mgmt.     Discussed plan above in great detail with patient and all questions answered to their satisfaction. Proceed with plan above. Greater than 50% of visit today was spent in discussing condition and continued treatment plan, counseling, education, for 40 minutes.            Kinga Lee M.D.  Hematology Oncology  [facility]

## 2020-10-09 DIAGNOSIS — R10.9 ABDOMINAL PAIN, UNSPECIFIED ABDOMINAL LOCATION: Primary | ICD-10-CM

## 2020-10-09 NOTE — Clinical Note
Seth Mobley, I contacted patient to inform sending this referral to [facility], discussed with this doc. Lets followup that it gets scheduled, thanks.

## 2020-10-09 NOTE — PROGRESS NOTES
Referral placed to Dr Mittal to evaluate/treat abdominal pain, abdominal wall/pelvic floor dysfunction with history of hemicolectomy X2 and chemotherapy for colon cancer.     GM

## 2020-10-16 ENCOUNTER — OFFICE VISIT (OUTPATIENT)
Dept: SURGERY | Facility: CLINIC | Age: 50
End: 2020-10-16
Payer: COMMERCIAL

## 2020-10-16 ENCOUNTER — PATIENT MESSAGE (OUTPATIENT)
Dept: HEMATOLOGY/ONCOLOGY | Facility: CLINIC | Age: 50
End: 2020-10-16

## 2020-10-16 VITALS
WEIGHT: 315 LBS | DIASTOLIC BLOOD PRESSURE: 95 MMHG | SYSTOLIC BLOOD PRESSURE: 164 MMHG | HEIGHT: 75 IN | HEART RATE: 87 BPM | BODY MASS INDEX: 39.17 KG/M2

## 2020-10-16 DIAGNOSIS — M62.838 LEVATOR SPASM: ICD-10-CM

## 2020-10-16 DIAGNOSIS — R10.2 PELVIC PAIN: Primary | ICD-10-CM

## 2020-10-16 DIAGNOSIS — Z01.818 PRE-OP EVALUATION: Primary | ICD-10-CM

## 2020-10-16 PROCEDURE — 3008F BODY MASS INDEX DOCD: CPT | Mod: CPTII,S$GLB,, | Performed by: COLON & RECTAL SURGERY

## 2020-10-16 PROCEDURE — 99204 PR OFFICE/OUTPT VISIT, NEW, LEVL IV, 45-59 MIN: ICD-10-PCS | Mod: S$GLB,,, | Performed by: COLON & RECTAL SURGERY

## 2020-10-16 PROCEDURE — 99999 PR PBB SHADOW E&M-EST. PATIENT-LVL V: ICD-10-PCS | Mod: PBBFAC,,, | Performed by: COLON & RECTAL SURGERY

## 2020-10-16 PROCEDURE — 3008F PR BODY MASS INDEX (BMI) DOCUMENTED: ICD-10-PCS | Mod: CPTII,S$GLB,, | Performed by: COLON & RECTAL SURGERY

## 2020-10-16 PROCEDURE — 99204 OFFICE O/P NEW MOD 45 MIN: CPT | Mod: S$GLB,,, | Performed by: COLON & RECTAL SURGERY

## 2020-10-16 PROCEDURE — 99999 PR PBB SHADOW E&M-EST. PATIENT-LVL V: CPT | Mod: PBBFAC,,, | Performed by: COLON & RECTAL SURGERY

## 2020-10-16 RX ORDER — NEEDLES, FILTER 19GX1 1/2"
NEEDLE, DISPOSABLE MISCELLANEOUS
COMMUNITY
End: 2023-11-21

## 2020-10-16 RX ORDER — SODIUM, POTASSIUM,MAG SULFATES 17.5-3.13G
SOLUTION, RECONSTITUTED, ORAL ORAL
Status: ON HOLD | COMMUNITY
End: 2020-12-31 | Stop reason: HOSPADM

## 2020-10-16 RX ORDER — PANTOPRAZOLE SODIUM 40 MG/1
40 TABLET, DELAYED RELEASE ORAL DAILY
COMMUNITY
Start: 2020-10-01

## 2020-10-16 RX ORDER — TESTOSTERONE CYPIONATE 200 MG/ML
200 INJECTION, SOLUTION INTRAMUSCULAR
COMMUNITY
End: 2022-05-04 | Stop reason: SDUPTHER

## 2020-10-16 NOTE — LETTER
October 16, 2020      Kinga Lee MD  [address]  [address]  [facility] [address]           Ronal Ramsey  [facility] [address] [address]  [address]  [facility] [address]  Phone: [phone]          Patient: Fredis Ugarte   MR Number: [id number]   YOB: 1970   Date of Visit: 10/16/2020       Dear Dr. Kinga Lee:    Thank you for referring Fredis Ugarte to me for evaluation. Attached you will find relevant portions of my assessment and plan of care.    If you have questions, please do not hesitate to call me. I look forward to following Fredis Ugarte along with you.    Sincerely,    Luis A Mittal MD    Enclosure  CC:  No Recipients    If you would like to receive this communication electronically, please contact [email or web address] or [phone] to request more information on [employer] access.    For providers and/or their staff who would like to refer a patient to [facility], please contact us through our one-stop-shop provider referral line, [facility], at [phone].    If you feel you have received this communication in error or would no longer like to receive these types of communications, please e-mail [email or web address]

## 2020-10-16 NOTE — PROGRESS NOTES
[facility] Office Visit History and Physical    Referring Md:   Kinga Lee Md  [address]  [address]  [place] [address]    SUBJECTIVE:     Chief Complaint: pelvic pain    History of Present Illness:  The patient is new patient to this practice.   Course is as follows:  Patient is a 50 y.o. male presents with pelvic pain  History of colon cancer s/p sigmoid colectomy x 2  Initially diagnosed in 8/2017.    8/1/17 he underwent colonoscopy that confirmed lesion at 20cm endophytic and ulcerated, 1/2 of colon lumen.   8/2/17 - laparoscopic assisted left hemicolectomy -       grossly with a 4-5 cm margin in either direction.                Pathology: low grade adenocarcinoma invading through muscularis propria into subserosal adipose tissue, neg margins, no LVI or PNI, no tumor deposits. 12 LN negative for tumor. Final pathologic stage pT3 pN0 - stage II. THI, intact.  9/27/18 - colonoscopy - biopsy of ulcer at anastomosis - pathology consis with adenocarcinoma  10/8/18 - CT c/a/p - PRIYANK   10/22/18 - hemicolectomy              Pathology: pT3 pN1a w/ 1 of 16 LN positive for malignancy. Well to mod differentiated. No tumor deposits. R0 resection.              Primary tumor 1.5cm in size invading to subserosal fat but not to serosa. LVI noted. No PNI noted.   Treated with adjuvant FOLFOX completed 06/26/2019.    Following his 1st surgery, he developed low pelvic pain with pelvic floor spasm.  This was worsened after his 2nd surgery.  His only symptom of recurrent cancer was pain.  No changes on his CT scan or in his tumor marker.  Since the 2nd surgery, he has had ongoing pelvic pain.  Difficulty with defecation.  Unable to differentiate the knee from urination from the need for defecation.  Has to sit in order to urinate and frequently will defecate at the same time.  He is tried gabapentin without significant benefit.  He then tried Cymbalta with initial benefit followed by some psychiatric complications  requiring him to stop Cymbalta.  He has tried pelvic floor physical therapy without any substantial benefit.  Pelvic floor physical therapy results in 2-3 days of significant pain following therapy.  He has tried to squat a potty with no assistance.  The pelvic pain is impacting his quality of life and his ability to function.  He is unable to work secondary to pain and discomfort.  He has regular bowel movements.  Denies incontinence.  No rectal bleeding.  No leakage of stool.    Last Colonoscopy: 8/19/2020 c-scope with Dr. Denny negative.    Review of patient's allergies indicates:   Allergen Reactions    Penicillins Shortness Of Breath and Rash       Past Medical History:   Diagnosis Date    Broken bones     Colon cancer     Hypertension      Past Surgical History:   Procedure Laterality Date    ADENOIDECTOMY      TONSILLECTOMY       Family History   Problem Relation Age of Onset    Colon cancer Mother     Diabetes Mother     Hypertension Mother      Social History     Tobacco Use    Smoking status: Never Smoker    Smokeless tobacco: Never Used   Substance Use Topics    Alcohol use: Yes     Comment: rarely    Drug use: No        Review of Systems:  Review of Systems   Constitutional: Negative for chills, diaphoresis, fever, malaise/fatigue and weight loss.   HENT: Negative for congestion.    Respiratory: Negative for shortness of breath.    Cardiovascular: Negative for chest pain and leg swelling.   Gastrointestinal: Positive for constipation. Negative for abdominal pain, blood in stool, nausea and vomiting.   Genitourinary: Positive for frequency. Negative for dysuria.   Musculoskeletal: Negative for back pain and myalgias.   Skin: Negative for rash.   Neurological: Negative for dizziness and weakness.   Endo/Heme/Allergies: Does not bruise/bleed easily.   Psychiatric/Behavioral: Negative for depression. The patient is nervous/anxious.        OBJECTIVE:     Vital Signs (Most Recent)  BP (!)  "164/95 (BP Location: Right arm, Patient Position: Sitting, BP Method: Large (Automatic))   Pulse 87   Ht 6' 3" (1.905 m)   Wt (!) 148.3 kg (326 lb 15.1 oz)   BMI 40.86 kg/m²     Physical Exam:  General: White male in no distress   Neuro: alert and oriented x 4.  Moves all extremities.     HEENT: no icterus.  Trachea midline  Respiratory: respirations are even and unlabored  Cardiac: regular rate  Abdomen:  Lower midline incision well healed.  Port sites all well healed.  No hernia.  Extremities: Warm dry and intact  Skin: no rashes  Anorectal:  Deferred    Labs: H&H 17 and 50.  Alb 4.0.  Normal renal function.  CEA 2.5    Imaging:   CT abd/pelvis 9/29/20.  Colorectal stapled anastomosis.  Moderate fecal burden.  No metastatic disease  CT chest 9/29/20: no metastatic disease      ASSESSMENT/PLAN:     Fredis was seen today for pelvic pain.    Diagnoses and all orders for this visit:    Pelvic pain  -     MRI Pelvis W WO Contrast; Future  -     Case Request Operating Room: Exam under anesthesia, flex sig, botox injection, lithotomy    Levator spasm  -     MRI Pelvis W WO Contrast; Future  -     Case Request Operating Room: Exam under anesthesia, flex sig, botox injection, lithotomy        50-year-old gentleman with history of sigmoid colon cancer status post resection in 2017 followed by local recurrence in 2018 requiring repeat for resection followed by adjuvant chemotherapy completed in June 2019.  He has since developed intractable pelvic floor pain that has not been alleviated with medical therapy or pelvic physical therapy.  MRI of the pelvis was requested in order to rule out any secondary causes of pelvic pain and to ensure that the perirectal tissues appeared normal.  I have offered him repeat exam under anesthesia with flexible sigmoidoscopy to evaluate the anastomosis.  If he appears amenable, we would then plan for Botox injection to bilateral levators.  Other treatment options include trial of " amitriptyline and increasing his Neurontin.    KARIN Mittal MD, FACS, FASCRS  Staff Surgeon  Colon & Rectal Surgery

## 2020-10-16 NOTE — H&P (VIEW-ONLY)
CRS Office Visit History and Physical    Referring Md:   Kinga Lee Md  [address] [facility]  [address]  [place] [address]    SUBJECTIVE:     Chief Complaint: pelvic pain    History of Present Illness:  The patient is new patient to this practice.   Course is as follows:  Patient is a 50 y.o. male presents with pelvic pain  History of colon cancer s/p sigmoid colectomy x 2  Initially diagnosed in 8/2017.    8/1/17 he underwent colonoscopy that confirmed lesion at 20cm endophytic and ulcerated, 1/2 of colon lumen.   8/2/17 - laparoscopic assisted left hemicolectomy -       grossly with a 4-5 cm margin in either direction.                Pathology: low grade adenocarcinoma invading through muscularis propria into subserosal adipose tissue, neg margins, no LVI or PNI, no tumor deposits. 12 LN negative for tumor. Final pathologic stage pT3 pN0 - stage II. THI, intact.  9/27/18 - colonoscopy - biopsy of ulcer at anastomosis - pathology consis with adenocarcinoma  10/8/18 - CT c/a/p - PRIYANK   10/22/18 - hemicolectomy              Pathology: pT3 pN1a w/ 1 of 16 LN positive for malignancy. Well to mod differentiated. No tumor deposits. R0 resection.              Primary tumor 1.5cm in size invading to subserosal fat but not to serosa. LVI noted. No PNI noted.   Treated with adjuvant FOLFOX completed 06/26/2019.    Following his 1st surgery, he developed low pelvic pain with pelvic floor spasm.  This was worsened after his 2nd surgery.  His only symptom of recurrent cancer was pain.  No changes on his CT scan or in his tumor marker.  Since the 2nd surgery, he has had ongoing pelvic pain.  Difficulty with defecation.  Unable to differentiate the knee from urination from the need for defecation.  Has to sit in order to urinate and frequently will defecate at the same time.  He is tried gabapentin without significant benefit.  He then tried Cymbalta with initial benefit followed by some psychiatric complications  requiring him to stop Cymbalta.  He has tried pelvic floor physical therapy without any substantial benefit.  Pelvic floor physical therapy results in 2-3 days of significant pain following therapy.  He has tried to squat a potty with no assistance.  The pelvic pain is impacting his quality of life and his ability to function.  He is unable to work secondary to pain and discomfort.  He has regular bowel movements.  Denies incontinence.  No rectal bleeding.  No leakage of stool.    Last Colonoscopy: 8/19/2020 c-scope with Dr. Denny negative.    Review of patient's allergies indicates:   Allergen Reactions    Penicillins Shortness Of Breath and Rash       Past Medical History:   Diagnosis Date    Broken bones     Colon cancer     Hypertension      Past Surgical History:   Procedure Laterality Date    ADENOIDECTOMY      TONSILLECTOMY       Family History   Problem Relation Age of Onset    Colon cancer Mother     Diabetes Mother     Hypertension Mother      Social History     Tobacco Use    Smoking status: Never Smoker    Smokeless tobacco: Never Used   Substance Use Topics    Alcohol use: Yes     Comment: rarely    Drug use: No        Review of Systems:  Review of Systems   Constitutional: Negative for chills, diaphoresis, fever, malaise/fatigue and weight loss.   HENT: Negative for congestion.    Respiratory: Negative for shortness of breath.    Cardiovascular: Negative for chest pain and leg swelling.   Gastrointestinal: Positive for constipation. Negative for abdominal pain, blood in stool, nausea and vomiting.   Genitourinary: Positive for frequency. Negative for dysuria.   Musculoskeletal: Negative for back pain and myalgias.   Skin: Negative for rash.   Neurological: Negative for dizziness and weakness.   Endo/Heme/Allergies: Does not bruise/bleed easily.   Psychiatric/Behavioral: Negative for depression. The patient is nervous/anxious.        OBJECTIVE:     Vital Signs (Most Recent)  BP (!)  "164/95 (BP Location: Right arm, Patient Position: Sitting, BP Method: Large (Automatic))   Pulse 87   Ht 6' 3" (1.905 m)   Wt (!) 148.3 kg (326 lb 15.1 oz)   BMI 40.86 kg/m²     Physical Exam:  General: White male in no distress   Neuro: alert and oriented x 4.  Moves all extremities.     HEENT: no icterus.  Trachea midline  Respiratory: respirations are even and unlabored  Cardiac: regular rate  Abdomen:  Lower midline incision well healed.  Port sites all well healed.  No hernia.  Extremities: Warm dry and intact  Skin: no rashes  Anorectal:  Deferred    Labs: H&H 17 and 50.  Alb 4.0.  Normal renal function.  CEA 2.5    Imaging:   CT abd/pelvis 9/29/20.  Colorectal stapled anastomosis.  Moderate fecal burden.  No metastatic disease  CT chest 9/29/20: no metastatic disease      ASSESSMENT/PLAN:     Fredis was seen today for pelvic pain.    Diagnoses and all orders for this visit:    Pelvic pain  -     MRI Pelvis W WO Contrast; Future  -     Case Request Operating Room: Exam under anesthesia, flex sig, botox injection, lithotomy    Levator spasm  -     MRI Pelvis W WO Contrast; Future  -     Case Request Operating Room: Exam under anesthesia, flex sig, botox injection, lithotomy        50-year-old gentleman with history of sigmoid colon cancer status post resection in 2017 followed by local recurrence in 2018 requiring repeat for resection followed by adjuvant chemotherapy completed in June 2019.  He has since developed intractable pelvic floor pain that has not been alleviated with medical therapy or pelvic physical therapy.  MRI of the pelvis was requested in order to rule out any secondary causes of pelvic pain and to ensure that the perirectal tissues appeared normal.  I have offered him repeat exam under anesthesia with flexible sigmoidoscopy to evaluate the anastomosis.  If he appears amenable, we would then plan for Botox injection to bilateral levators.  Other treatment options include trial of " amitriptyline and increasing his Neurontin.    KARIN Mittal MD, FACS, FASCRS  Staff Surgeon  Colon & Rectal Surgery

## 2020-10-19 ENCOUNTER — PATIENT MESSAGE (OUTPATIENT)
Dept: SURGERY | Facility: HOSPITAL | Age: 50
End: 2020-10-19

## 2020-10-19 DIAGNOSIS — K62.89 RECTAL PAIN: Primary | ICD-10-CM

## 2020-10-19 RX ORDER — GABAPENTIN 300 MG/1
300 CAPSULE ORAL 3 TIMES DAILY
Qty: 90 CAPSULE | Refills: 11 | Status: SHIPPED | OUTPATIENT
Start: 2020-10-19 | End: 2021-05-27 | Stop reason: SDUPTHER

## 2020-10-23 ENCOUNTER — LAB VISIT (OUTPATIENT)
Dept: SURGERY | Facility: CLINIC | Age: 50
End: 2020-10-23
Payer: COMMERCIAL

## 2020-10-23 ENCOUNTER — HOSPITAL ENCOUNTER (OUTPATIENT)
Dept: RADIOLOGY | Facility: HOSPITAL | Age: 50
Discharge: HOME | End: 2020-10-23
Attending: COLON & RECTAL SURGERY
Payer: COMMERCIAL

## 2020-10-23 DIAGNOSIS — R10.2 PELVIC PAIN: ICD-10-CM

## 2020-10-23 DIAGNOSIS — C18.7 MALIGNANT NEOPLASM OF SIGMOID COLON: Primary | ICD-10-CM

## 2020-10-23 DIAGNOSIS — Z01.818 PRE-OP EVALUATION: ICD-10-CM

## 2020-10-23 DIAGNOSIS — M62.838 LEVATOR SPASM: ICD-10-CM

## 2020-10-23 LAB — SARS-COV-2 RNA RESP QL NAA+PROBE: NOT DETECTED

## 2020-10-23 PROCEDURE — A9585 GADOBUTROL INJECTION: HCPCS | Performed by: COLON & RECTAL SURGERY

## 2020-10-23 PROCEDURE — 72197 MRI PELVIS W/O & W/DYE: CPT | Mod: 26,,, | Performed by: RADIOLOGY

## 2020-10-23 PROCEDURE — U0003 INFECTIOUS AGENT DETECTION BY NUCLEIC ACID (DNA OR RNA); SEVERE ACUTE RESPIRATORY SYNDROME CORONAVIRUS 2 (SARS-COV-2) (CORONAVIRUS DISEASE [COVID-19]), AMPLIFIED PROBE TECHNIQUE, MAKING USE OF HIGH THROUGHPUT TECHNOLOGIES AS DESCRIBED BY CMS-2020-01-R: HCPCS

## 2020-10-23 PROCEDURE — 72197 MRI PELVIS W/O & W/DYE: CPT | Mod: TC

## 2020-10-23 PROCEDURE — 25500020 PHARM REV CODE 255: Performed by: COLON & RECTAL SURGERY

## 2020-10-23 PROCEDURE — 72197 MRI PELVIS W WO CONTRAST: ICD-10-PCS | Mod: 26,,, | Performed by: RADIOLOGY

## 2020-10-23 RX ORDER — GADOBUTROL 604.72 MG/ML
10 INJECTION INTRAVENOUS
Status: COMPLETED | OUTPATIENT
Start: 2020-10-23 | End: 2020-10-23

## 2020-10-23 RX ADMIN — GADOBUTROL 10 ML: 604.72 INJECTION INTRAVENOUS at 07:10

## 2020-10-23 NOTE — PRE-PROCEDURE INSTRUCTIONS
PREOP INSTRUCTIONS:No solid food ,milk or milk products for 8 hours prior to procedure.Clear liquids are allowed up to 2 hours before procedure.Clear liquids are:water,apple juice,gatorade & powerade.Instructed to follow the surgeon's instructions if they differ from these.Shower instructions as well as directions to the Surgery Center were given.Encouraged to wear loose fitting,comfortable clothing.Medication instructions for pm prior to and am of procedure reviewed.Instructed to avoid taking vitamins,supplements,aspirin and ibuprofen the morning of surgery. Patient's questions and concerns addressed .Patient informed of the current visitor policy and advised patient that one visitor may accompany each patient into the hospital and wait (socially distanced) until a member of the medical team provides an update at the conclusion of the procedure.When they enter the hospital both patient and visitor will have their temperature checked.All visitors are asked to arrive with a mask and to keep their mask on throughout the visit.     Patient denies any side effects or issues with anesthesia or sedation.     Patient does not know arrival time.Explained that this information comes from the surgeon's office and if they haven't heard from them by 2 or 3 pm to call the office.Patient stated an understanding.

## 2020-10-25 ENCOUNTER — ANESTHESIA EVENT (OUTPATIENT)
Dept: SURGERY | Facility: HOSPITAL | Age: 50
End: 2020-10-25
Payer: COMMERCIAL

## 2020-10-26 ENCOUNTER — ANESTHESIA (OUTPATIENT)
Dept: SURGERY | Facility: HOSPITAL | Age: 50
End: 2020-10-26
Payer: COMMERCIAL

## 2020-10-26 ENCOUNTER — HOSPITAL ENCOUNTER (OUTPATIENT)
Facility: HOSPITAL | Age: 50
Discharge: HOME | End: 2020-10-26
Attending: COLON & RECTAL SURGERY | Admitting: COLON & RECTAL SURGERY
Payer: COMMERCIAL

## 2020-10-26 VITALS
TEMPERATURE: 98 F | HEART RATE: 83 BPM | DIASTOLIC BLOOD PRESSURE: 71 MMHG | BODY MASS INDEX: 39.17 KG/M2 | WEIGHT: 315 LBS | SYSTOLIC BLOOD PRESSURE: 113 MMHG | RESPIRATION RATE: 16 BRPM | OXYGEN SATURATION: 95 % | HEIGHT: 75 IN

## 2020-10-26 DIAGNOSIS — K62.89 RECTAL PAIN: Primary | ICD-10-CM

## 2020-10-26 PROCEDURE — D9220A PRA ANESTHESIA: Mod: CRNA,,, | Performed by: NURSE ANESTHETIST, CERTIFIED REGISTERED

## 2020-10-26 PROCEDURE — 25000003 PHARM REV CODE 250: Performed by: NURSE PRACTITIONER

## 2020-10-26 PROCEDURE — 36000704 HC OR TIME LEV I 1ST 15 MIN: Performed by: COLON & RECTAL SURGERY

## 2020-10-26 PROCEDURE — D9220A PRA ANESTHESIA: ICD-10-PCS | Mod: ANES,,, | Performed by: ANESTHESIOLOGY

## 2020-10-26 PROCEDURE — 63600175 PHARM REV CODE 636 W HCPCS: Performed by: NURSE ANESTHETIST, CERTIFIED REGISTERED

## 2020-10-26 PROCEDURE — 36000705 HC OR TIME LEV I EA ADD 15 MIN: Performed by: COLON & RECTAL SURGERY

## 2020-10-26 PROCEDURE — 64646 PR CHEMODENERV TRUNK MUSCLE(S); 1-5 MUSCLE(S): ICD-10-PCS | Mod: ,,, | Performed by: COLON & RECTAL SURGERY

## 2020-10-26 PROCEDURE — 71000044 HC DOSC ROUTINE RECOVERY FIRST HOUR: Performed by: COLON & RECTAL SURGERY

## 2020-10-26 PROCEDURE — 71000015 HC POSTOP RECOV 1ST HR: Performed by: COLON & RECTAL SURGERY

## 2020-10-26 PROCEDURE — 37000009 HC ANESTHESIA EA ADD 15 MINS: Performed by: COLON & RECTAL SURGERY

## 2020-10-26 PROCEDURE — D9220A PRA ANESTHESIA: Mod: ANES,,, | Performed by: ANESTHESIOLOGY

## 2020-10-26 PROCEDURE — D9220A PRA ANESTHESIA: ICD-10-PCS | Mod: CRNA,,, | Performed by: NURSE ANESTHETIST, CERTIFIED REGISTERED

## 2020-10-26 PROCEDURE — 64646 CHEMODENERV TRUNK MUSC 1-5: CPT | Mod: ,,, | Performed by: COLON & RECTAL SURGERY

## 2020-10-26 PROCEDURE — 37000008 HC ANESTHESIA 1ST 15 MINUTES: Performed by: COLON & RECTAL SURGERY

## 2020-10-26 PROCEDURE — 63600175 PHARM REV CODE 636 W HCPCS: Mod: JG | Performed by: COLON & RECTAL SURGERY

## 2020-10-26 RX ORDER — SODIUM CHLORIDE 0.9 % (FLUSH) 0.9 %
3 SYRINGE (ML) INJECTION EVERY 30 MIN PRN
Status: DISCONTINUED | OUTPATIENT
Start: 2020-10-26 | End: 2020-10-26 | Stop reason: HOSPADM

## 2020-10-26 RX ORDER — PROPOFOL 10 MG/ML
VIAL (ML) INTRAVENOUS
Status: DISCONTINUED | OUTPATIENT
Start: 2020-10-26 | End: 2020-10-26

## 2020-10-26 RX ORDER — CYCLOBENZAPRINE HCL 5 MG
5 TABLET ORAL 3 TIMES DAILY PRN
COMMUNITY
End: 2021-01-01 | Stop reason: SDUPTHER

## 2020-10-26 RX ORDER — SODIUM CHLORIDE 9 MG/ML
INJECTION, SOLUTION INTRAVENOUS CONTINUOUS
Status: ACTIVE | OUTPATIENT
Start: 2020-10-26

## 2020-10-26 RX ORDER — MUPIROCIN 20 MG/G
OINTMENT TOPICAL
Status: DISPENSED | OUTPATIENT
Start: 2020-10-26

## 2020-10-26 RX ORDER — DEXAMETHASONE SODIUM PHOSPHATE 4 MG/ML
INJECTION, SOLUTION INTRA-ARTICULAR; INTRALESIONAL; INTRAMUSCULAR; INTRAVENOUS; SOFT TISSUE
Status: DISCONTINUED | OUTPATIENT
Start: 2020-10-26 | End: 2020-10-26

## 2020-10-26 RX ORDER — HYDROMORPHONE HYDROCHLORIDE 1 MG/ML
0.2 INJECTION, SOLUTION INTRAMUSCULAR; INTRAVENOUS; SUBCUTANEOUS EVERY 5 MIN PRN
Status: DISCONTINUED | OUTPATIENT
Start: 2020-10-26 | End: 2020-10-26 | Stop reason: HOSPADM

## 2020-10-26 RX ORDER — FENTANYL CITRATE 50 UG/ML
INJECTION, SOLUTION INTRAMUSCULAR; INTRAVENOUS
Status: DISCONTINUED | OUTPATIENT
Start: 2020-10-26 | End: 2020-10-26

## 2020-10-26 RX ORDER — ONDANSETRON 2 MG/ML
4 INJECTION INTRAMUSCULAR; INTRAVENOUS DAILY PRN
Status: DISCONTINUED | OUTPATIENT
Start: 2020-10-26 | End: 2020-10-26 | Stop reason: HOSPADM

## 2020-10-26 RX ORDER — PROPOFOL 10 MG/ML
VIAL (ML) INTRAVENOUS CONTINUOUS PRN
Status: DISCONTINUED | OUTPATIENT
Start: 2020-10-26 | End: 2020-10-26

## 2020-10-26 RX ORDER — MIDAZOLAM HYDROCHLORIDE 1 MG/ML
INJECTION, SOLUTION INTRAMUSCULAR; INTRAVENOUS
Status: DISCONTINUED | OUTPATIENT
Start: 2020-10-26 | End: 2020-10-26

## 2020-10-26 RX ORDER — ONDANSETRON 2 MG/ML
INJECTION INTRAMUSCULAR; INTRAVENOUS
Status: DISCONTINUED | OUTPATIENT
Start: 2020-10-26 | End: 2020-10-26

## 2020-10-26 RX ADMIN — DEXAMETHASONE SODIUM PHOSPHATE 4 MG: 4 INJECTION, SOLUTION INTRAMUSCULAR; INTRAVENOUS at 10:10

## 2020-10-26 RX ADMIN — FENTANYL CITRATE 25 MCG: 50 INJECTION, SOLUTION INTRAMUSCULAR; INTRAVENOUS at 10:10

## 2020-10-26 RX ADMIN — ONDANSETRON 4 MG: 2 INJECTION, SOLUTION INTRAMUSCULAR; INTRAVENOUS at 10:10

## 2020-10-26 RX ADMIN — PROPOFOL 100 MCG/KG/MIN: 10 INJECTION, EMULSION INTRAVENOUS at 10:10

## 2020-10-26 RX ADMIN — PROPOFOL 40 MG: 10 INJECTION, EMULSION INTRAVENOUS at 10:10

## 2020-10-26 RX ADMIN — MUPIROCIN: 20 OINTMENT TOPICAL at 09:10

## 2020-10-26 RX ADMIN — MIDAZOLAM HYDROCHLORIDE 2 MG: 1 INJECTION, SOLUTION INTRAMUSCULAR; INTRAVENOUS at 10:10

## 2020-10-26 RX ADMIN — SODIUM CHLORIDE: 0.9 INJECTION, SOLUTION INTRAVENOUS at 09:10

## 2020-10-26 RX ADMIN — SODIUM CHLORIDE: 0.9 INJECTION, SOLUTION INTRAVENOUS at 10:10

## 2020-10-26 NOTE — ANESTHESIA PREPROCEDURE EVALUATION
10/26/2020  Fredis Ugarte is a 50 y.o., male.    Past Medical History:   Diagnosis Date    Broken bones     Colon cancer     Hypertension        Past Surgical History:   Procedure Laterality Date    ADENOIDECTOMY      TONSILLECTOMY         Anesthesia Evaluation    I have reviewed the Patient Summary Reports.    I have reviewed the Nursing Notes. I have reviewed the NPO Status.      Review of Systems  Anesthesia Hx:  No problems with previous Anesthesia  History of prior surgery of interest to airway management or planning: Denies Family Hx of Anesthesia complications.   Denies Personal Hx of Anesthesia complications.   Cardiovascular:   Exercise tolerance: good Hypertension, well controlled Denies MI.    Denies Angina.     ECG has been reviewed.    Pulmonary:   Sleep Apnea Diagnosed with YAS by sleep study, doesn't use CPAP (can't tolerate mask), wife states he does not stop breathing at night and usually doesn't snore   Renal/:  Renal/ Normal     Neurological:   Headaches        Physical Exam  General:  Well nourished, Morbid Obesity    Airway/Jaw/Neck:  Airway Findings: Mouth Opening: Normal Tongue: Normal  General Airway Assessment: Adult  Mallampati: III  TM Distance: Normal, at least 6 cm      Dental:  Dental Findings: In tact   Chest/Lungs:  Chest/Lungs Findings: Normal Respiratory Rate     Heart/Vascular:  Heart Findings: Rate: Normal        Mental Status:  Mental Status Findings:  Alert and Oriented         Anesthesia Plan  Type of Anesthesia, risks & benefits discussed:  Anesthesia Type:  general, MAC  Patient's Preference:   Intra-op Monitoring Plan: standard ASA monitors  Intra-op Monitoring Plan Comments:   Post Op Pain Control Plan: multimodal analgesia, IV/PO Opioids PRN and per primary service following discharge from PACU  Post Op Pain Control Plan Comments:   Induction:   IV  Beta  Blocker:  Patient is not currently on a Beta-Blocker (No further documentation required).       Informed Consent: Patient understands risks and agrees with Anesthesia plan.  Questions answered. Anesthesia consent signed with patient.  ASA Score: 3     Day of Surgery Review of History & Physical:    H&P update referred to the surgeon.         Ready For Surgery From Anesthesia Perspective.

## 2020-10-26 NOTE — ANESTHESIA POSTPROCEDURE EVALUATION
Anesthesia Post Evaluation    Patient: Fredis Ugarte    Procedure(s) Performed: Procedure(s) (LRB):  Exam under anesthesia, flex sig, botox injection, lithotomy (N/A)    Final Anesthesia Type: general    Patient location during evaluation: PACU  Patient participation: Yes- Able to Participate  Level of consciousness: awake and alert  Post-procedure vital signs: reviewed and stable  Pain management: adequate  Airway patency: patent    PONV status at discharge: No PONV  Anesthetic complications: no      Cardiovascular status: blood pressure returned to baseline  Respiratory status: unassisted, room air and spontaneous ventilation  Hydration status: euvolemic  Follow-up not needed.          Vitals Value Taken Time   /71 10/26/20 1132   Temp 36.8 °C (98.2 °F) 10/26/20 1130   Pulse 84 10/26/20 1134   Resp 18 10/26/20 1134   SpO2 95 % 10/26/20 1134   Vitals shown include unvalidated device data.      No case tracking events are documented in the log.      Pain/Leslee Score: Leslee Score: 9 (10/26/2020 10:51 AM)

## 2020-10-26 NOTE — OP NOTE
FREDIS UGARTE  [id number]  [id number]    OPERATIVE NOTE:    DATE OF OPERATION: 10/26/2020    PREOPERATIVE DIAGNOSIS:  Pelvic pain and levator spasm with history of colon cancer x2  POSTOPERATIVE DIAGNOSIS:  Pelvic pain and levator spasm with history of colon cancer x2    PROCEDURE PERFORMED:   1.  Exam under anesthesia with injection of Botox into the bilateral levators  2.  Flexible sigmoidoscopy    ATTENDING SURGEON: KARIN Mittal MD    RESIDENT/ FELLOW SURGEON: MD Krista    ANESTHESIA:  General    ESTIMATED BLOOD LOSS:  Minimal mL    FINDINGS:  1.  Healthy appearing distal colorectal Benton style anastomosis with no evidence of local recurrence.          SPECIMENS:  None    COMPLICATIONS:  None apparent    DISPOSITION: PACU    CONDITION: good    INDICATION FOR PROCEDURE:  Fredis Ugarte is a 50 y.o. male with a history of sigmoid colon cancer status post resection with local recurrence years later.  He has since developed intractable levator spasm and pelvic floor pain.  This has been refractory to medical therapy as well as pelvic physical therapy.  He therefore presented and wished to the undergo Botox injection.  Exam under anesthesia with flexible sigmoidoscopy was recommended given his history of recurrence with prior pain    DESCRIPTION OF PROCEDURE:   After informed consent was reviewed, the patient was taken to the operating room and placed under mac anesthesia.  he was placed in the lithotomy position.  A time-out was then performed.  Flexible colonoscope was inserted through the anal canal and advanced up to 50 cm.  The scope was slowly withdrawn.  The transverse colon appeared healthy.  There was a colon to mid rectum Baker style anastomosis that appeared circumferentially healthy without any evidence of local recurrent disease.  The remainder of the rectum appeared healthy.  The scope was withdrawn.  The perianal skin was then prepped with Betadine.  Digital exam confirmed location of  the levators bilaterally.  100 units of botulinum toxin were drawn up and 10 ml of saline.  50 units was injected into either side into the levator under digital guidance.  The patient tolerated the procedure well.  There were no apparent complications.  Fluff dressings were applied.  he was then extubated and taken to PACU in stable condition.        KARIN Mittal MD, FACS, FASCRS  Staff Surgeon  Colon & Rectal Surgery

## 2020-10-26 NOTE — BRIEF OP NOTE
[facility]  Brief Operative Note    Surgery Date: 10/26/2020     Surgeon(s) and Role:     * Luis A Mittal MD - Primary     * Servando Velasco MD - Fellow    Assisting Surgeon: None    Pre-op Diagnosis:  Pelvic pain [R10.2]  Levator spasm [M62.838]    Post-op Diagnosis:  Post-Op Diagnosis Codes:     * Pelvic pain [R10.2]     * Levator spasm [M62.838]    Procedure(s) (LRB):  Exam under anesthesia, flex sig, botox injection, lithotomy (N/A)    Anesthesia: General    Description of the findings of the procedure(s): Intact anastomosis    Estimated Blood Loss: * No values recorded between 10/26/2020 10:20 AM and 10/26/2020 10:45 AM *         Specimens:   Specimen (12h ago, onward)    None            Discharge Note    OUTCOME: Patient tolerated treatment/procedure well without complication and is now ready for discharge.    DISPOSITION: Home or Self Care    FINAL DIAGNOSIS:  Levator Ani Syndrome    FOLLOWUP: In clinic    DISCHARGE INSTRUCTIONS:  No discharge procedures on file.

## 2020-10-26 NOTE — INTERVAL H&P NOTE
The patient has been examined and the H&P has been reviewed:    I concur with the findings and no changes have occurred since H&P was written.    Surgery risks, benefits and alternative options discussed and understood by patient/family.          Active Hospital Problems    Diagnosis  POA    Rectal pain [K62.89]  Yes      Resolved Hospital Problems   No resolved problems to display.

## 2020-10-26 NOTE — PLAN OF CARE
Patient and spouse state they are ready to be discharged. Instructions given to patient and family. Both verbalize understanding. Patient tolerating po liquids with no difficulty. Patient denies pain. Anesthesia consent and surgical consent in chart upon patient's discharge from [facility].

## 2020-10-26 NOTE — DISCHARGE INSTRUCTIONS
Anal Surgery Post Op Instructions:    You had an examination under anesthesia, flexible sigmoidoscopy, and Botox Injection to levator ani muscles performed today.     Wound care:  No wound care needed    You have no activity restrictions.   No dietary restrictions.    Medications:  Please increase your dose of Gabapentin as discussed      Follow up:  Return to clinic in 4 weeks for follow up and wound check.    KARIN Mittal MD  Staff Surgeon  Colon & Rectal Surgery

## 2020-10-26 NOTE — TRANSFER OF CARE
"Anesthesia Transfer of Care Note    Patient: Fredis Ugarte    Procedure(s) Performed: Procedure(s) (LRB):  Exam under anesthesia, flex sig, botox injection, lithotomy (N/A)    Patient location: [facility]    Anesthesia Type: general    Transport from OR: Transported from OR on 6-10 L/min O2 by face mask with adequate spontaneous ventilation    Post pain: adequate analgesia    Post assessment: no apparent anesthetic complications and tolerated procedure well    Post vital signs: stable    Level of consciousness: awake and alert    Nausea/Vomiting: no nausea/vomiting    Complications: none    Transfer of care protocol was followed      Last vitals:   Visit Vitals  BP (!) 159/87 (BP Location: Right arm, Patient Position: Lying)   Pulse 90   Temp 36.8 °C (98.3 °F) (Oral)   Resp 20   Ht 6' 3" (1.905 m)   Wt (!) 142.9 kg (315 lb)   SpO2 98%   BMI 39.37 kg/m²     "

## 2020-11-02 ENCOUNTER — PATIENT MESSAGE (OUTPATIENT)
Dept: SURGERY | Facility: CLINIC | Age: 50
End: 2020-11-02

## 2020-11-04 DIAGNOSIS — C18.9 ADENOCARCINOMA OF COLON: Primary | ICD-10-CM

## 2020-11-09 ENCOUNTER — HOSPITAL ENCOUNTER (OUTPATIENT)
Dept: INTERVENTIONAL RADIOLOGY/VASCULAR | Facility: HOSPITAL | Age: 50
Discharge: HOME | End: 2020-11-09
Attending: PHYSICIAN ASSISTANT
Payer: COMMERCIAL

## 2020-11-09 VITALS
WEIGHT: 315 LBS | TEMPERATURE: 98 F | OXYGEN SATURATION: 100 % | BODY MASS INDEX: 39.17 KG/M2 | SYSTOLIC BLOOD PRESSURE: 139 MMHG | HEART RATE: 92 BPM | RESPIRATION RATE: 23 BRPM | DIASTOLIC BLOOD PRESSURE: 86 MMHG | HEIGHT: 75 IN

## 2020-11-09 DIAGNOSIS — C18.9 ADENOCARCINOMA OF COLON: ICD-10-CM

## 2020-11-09 PROCEDURE — 99153 MOD SED SAME PHYS/QHP EA: CPT

## 2020-11-09 PROCEDURE — 88342 IMHCHEM/IMCYTCHM 1ST ANTB: CPT | Performed by: PATHOLOGY

## 2020-11-09 PROCEDURE — 77012 CT SCAN FOR NEEDLE BIOPSY: CPT | Mod: 26,,, | Performed by: RADIOLOGY

## 2020-11-09 PROCEDURE — 88305 TISSUE EXAM BY PATHOLOGIST: CPT | Performed by: PATHOLOGY

## 2020-11-09 PROCEDURE — 25000003 PHARM REV CODE 250: Performed by: RADIOLOGY

## 2020-11-09 PROCEDURE — 63600175 PHARM REV CODE 636 W HCPCS: Performed by: RADIOLOGY

## 2020-11-09 PROCEDURE — 77012 CT SCAN FOR NEEDLE BIOPSY: CPT | Mod: TC

## 2020-11-09 PROCEDURE — 88341 IMHCHEM/IMCYTCHM EA ADD ANTB: CPT | Mod: 26,,, | Performed by: PATHOLOGY

## 2020-11-09 PROCEDURE — 88305 TISSUE EXAM BY PATHOLOGIST: CPT | Mod: 26,,, | Performed by: PATHOLOGY

## 2020-11-09 PROCEDURE — 45100 BIOPSY OF RECTUM: CPT | Mod: ,,, | Performed by: RADIOLOGY

## 2020-11-09 PROCEDURE — 27201068 IR CT GUIDANCE FOR NEEDLE PLACEMENT

## 2020-11-09 PROCEDURE — 99152 PR MOD CONSCIOUS SEDATION, SAME PHYS, 5+ YRS, FIRST 15 MIN: ICD-10-PCS | Mod: ,,, | Performed by: RADIOLOGY

## 2020-11-09 PROCEDURE — 88342 IMHCHEM/IMCYTCHM 1ST ANTB: CPT | Mod: 26,,, | Performed by: PATHOLOGY

## 2020-11-09 PROCEDURE — 45100 IR CT GUIDANCE FOR NEEDLE PLACEMENT: ICD-10-PCS | Mod: ,,, | Performed by: RADIOLOGY

## 2020-11-09 PROCEDURE — 88305 TISSUE EXAM BY PATHOLOGIST: ICD-10-PCS | Mod: 26,,, | Performed by: PATHOLOGY

## 2020-11-09 PROCEDURE — 99152 MOD SED SAME PHYS/QHP 5/>YRS: CPT | Mod: ,,, | Performed by: RADIOLOGY

## 2020-11-09 PROCEDURE — 63600175 PHARM REV CODE 636 W HCPCS: Performed by: FAMILY MEDICINE

## 2020-11-09 PROCEDURE — 77012 PR  CT GUIDANCE NEEDLE PLACEMENT: ICD-10-PCS | Mod: 26,,, | Performed by: RADIOLOGY

## 2020-11-09 PROCEDURE — 88341 PR IHC OR ICC EACH ADD'L SINGLE ANTIBODY  STAINPR: ICD-10-PCS | Mod: 26,,, | Performed by: PATHOLOGY

## 2020-11-09 PROCEDURE — 88333 PR  INTRAOPERATIVE CYTO PATH CONSULT, INITIAL SITE: ICD-10-PCS | Mod: 26,,, | Performed by: PATHOLOGY

## 2020-11-09 PROCEDURE — 99152 MOD SED SAME PHYS/QHP 5/>YRS: CPT

## 2020-11-09 PROCEDURE — 88333 PATH CONSLTJ SURG CYTO XM 1: CPT | Performed by: PATHOLOGY

## 2020-11-09 PROCEDURE — 88333 PATH CONSLTJ SURG CYTO XM 1: CPT | Mod: 26,,, | Performed by: PATHOLOGY

## 2020-11-09 PROCEDURE — 88342 CHG IMMUNOCYTOCHEMISTRY: ICD-10-PCS | Mod: 26,,, | Performed by: PATHOLOGY

## 2020-11-09 PROCEDURE — 88341 IMHCHEM/IMCYTCHM EA ADD ANTB: CPT | Performed by: PATHOLOGY

## 2020-11-09 PROCEDURE — 45100 BIOPSY OF RECTUM: CPT

## 2020-11-09 RX ORDER — HYDRALAZINE HYDROCHLORIDE 20 MG/ML
INJECTION INTRAMUSCULAR; INTRAVENOUS CODE/TRAUMA/SEDATION MEDICATION
Status: COMPLETED | OUTPATIENT
Start: 2020-11-09 | End: 2020-11-09

## 2020-11-09 RX ORDER — ONDANSETRON 2 MG/ML
4 INJECTION INTRAMUSCULAR; INTRAVENOUS EVERY 6 HOURS PRN
Status: CANCELLED | OUTPATIENT
Start: 2020-11-09

## 2020-11-09 RX ORDER — MIDAZOLAM HYDROCHLORIDE 1 MG/ML
1 INJECTION INTRAMUSCULAR; INTRAVENOUS
Status: DISCONTINUED | OUTPATIENT
Start: 2020-11-09 | End: 2020-11-10 | Stop reason: HOSPADM

## 2020-11-09 RX ORDER — SODIUM CHLORIDE 9 MG/ML
500 INJECTION, SOLUTION INTRAVENOUS ONCE
Status: DISCONTINUED | OUTPATIENT
Start: 2020-11-09 | End: 2020-11-10 | Stop reason: HOSPADM

## 2020-11-09 RX ORDER — LIDOCAINE HYDROCHLORIDE 10 MG/ML
INJECTION INFILTRATION; PERINEURAL CODE/TRAUMA/SEDATION MEDICATION
Status: COMPLETED | OUTPATIENT
Start: 2020-11-09 | End: 2020-11-09

## 2020-11-09 RX ORDER — FENTANYL CITRATE 50 UG/ML
50 INJECTION, SOLUTION INTRAMUSCULAR; INTRAVENOUS
Status: DISCONTINUED | OUTPATIENT
Start: 2020-11-09 | End: 2020-11-10 | Stop reason: HOSPADM

## 2020-11-09 RX ORDER — MIDAZOLAM HYDROCHLORIDE 1 MG/ML
INJECTION INTRAMUSCULAR; INTRAVENOUS CODE/TRAUMA/SEDATION MEDICATION
Status: COMPLETED | OUTPATIENT
Start: 2020-11-09 | End: 2020-11-09

## 2020-11-09 RX ORDER — FENTANYL CITRATE 50 UG/ML
INJECTION, SOLUTION INTRAMUSCULAR; INTRAVENOUS CODE/TRAUMA/SEDATION MEDICATION
Status: COMPLETED | OUTPATIENT
Start: 2020-11-09 | End: 2020-11-09

## 2020-11-09 RX ADMIN — MIDAZOLAM HYDROCHLORIDE 1 MG: 1 INJECTION, SOLUTION INTRAMUSCULAR; INTRAVENOUS at 02:11

## 2020-11-09 RX ADMIN — FENTANYL CITRATE 50 MCG: 50 INJECTION, SOLUTION INTRAMUSCULAR; INTRAVENOUS at 02:11

## 2020-11-09 RX ADMIN — LIDOCAINE HYDROCHLORIDE 5 ML: 10 INJECTION, SOLUTION INFILTRATION; PERINEURAL at 02:11

## 2020-11-09 RX ADMIN — HYDRALAZINE HYDROCHLORIDE 10 MG: 20 INJECTION INTRAMUSCULAR; INTRAVENOUS at 02:11

## 2020-11-09 RX ADMIN — FENTANYL CITRATE 25 MCG: 50 INJECTION, SOLUTION INTRAMUSCULAR; INTRAVENOUS at 02:11

## 2020-11-09 RX ADMIN — MIDAZOLAM HYDROCHLORIDE 0.5 MG: 1 INJECTION, SOLUTION INTRAMUSCULAR; INTRAVENOUS at 02:11

## 2020-11-09 NOTE — PROCEDURES
Radiology Post-Procedure Note    Pre Op Diagnosis: mCRC    Post Op Diagnosis: Same    Procedure: Biopsy of right rectus mass    Procedure performed by: Chadd Garner MD    Written Informed Consent Obtained: Yes  Specimen Removed: YES 8 x 18 gauge cores  Estimated Blood Loss: Minimal    Findings:   Under CT and US guidance, a 17/18 gauge biopsy system was used to sample rectus mass on the right. No complications.    Patient tolerated procedure well.    Chadd Garner M.D.  Diagnostic and Interventional Radiologist  Department of Radiology  Pager: [phone]

## 2020-11-09 NOTE — DISCHARGE SUMMARY
Radiology Discharge Summary      Hospital Course: No complications    Admit Date: 11/9/2020  Discharge Date: 11/09/2020     Instructions Given to Patient: Yes  Diet: Resume prior diet  Activity: activity as tolerated and no driving for today    Description of Condition on Discharge: Stable  Vital Signs (Most Recent): Temp: 97.6 °F (36.4 °C) (11/09/20 1311)  Pulse: 95 (11/09/20 1434)  Resp: 17 (11/09/20 1434)  BP: (!) 141/72 (11/09/20 1434)  SpO2: 95 % (11/09/20 1434)    Discharge Disposition: Home    Discharge Diagnosis: CRC s/p biopsy of rectus mass on the right. Follow up as scheduled.    Chadd Garner M.D.  Diagnostic and Interventional Radiologist  Department of Radiology  Pager: [phone]

## 2020-11-09 NOTE — PLAN OF CARE
Pt arrived to IR room 173 for biopsy, no acute distress noted. Orders, contrast and labs reviewed on chart.

## 2020-11-09 NOTE — PLAN OF CARE
Biopsy completed, pt tolerated well. No apparent distress noted. Dressing applied CDI. Biopsie collected and sent with pathologist. Pt transferred to ROCu, report given at bedside. Per Dr. Garner 1 hr recovery.

## 2020-11-09 NOTE — H&P
Radiology History & Physical      SUBJECTIVE:     Chief Complaint: R lower abdominal wall mass    History of Present Illness:  Fredis Ugarte is a 50 y.o. male with history of colon cancer s/p colectomy here for biopsy of lower R rectus mass seen on MRI 10/23/20.    Past Medical History:   Diagnosis Date    Broken bones     Colon cancer     Hypertension      Past Surgical History:   Procedure Laterality Date    ADENOIDECTOMY      EXAMINATION UNDER ANESTHESIA N/A 10/26/2020    Procedure: Exam under anesthesia, flex sig, botox injection, lithotomy;  Surgeon: KARIN Mittal MD;  [facility];  Service: Endoscopy;  Laterality: N/A;    FLEXIBLE SIGMOIDOSCOPY  10/26/2020    Procedure: SIGMOIDOSCOPY, FLEXIBLE;  Surgeon: KARIN Mittal MD;  [facility];  Service: Endoscopy;;    INJECTION OF BOTULINUM TOXIN TYPE A  10/26/2020    Procedure: INJECTION, BOTULINUM TOXIN, TYPE A;  Surgeon: KARIN Mittal MD;  [facility];  Service: Endoscopy;;    TONSILLECTOMY         Home Meds:   Prior to Admission medications    Medication Sig Start Date End Date Taking? Authorizing Provider   aspirin (ECOTRIN) 81 MG EC tablet Take 81 mg by mouth once daily.   Yes Historical Provider   cetirizine (ZYRTEC) 10 MG tablet Take 10 mg by mouth every evening.    Yes Historical Provider   cyclobenzaprine (FLEXERIL) 5 MG tablet Take 5 mg by mouth 3 (three) times daily as needed for Muscle spasms.   Yes Historical Provider   gabapentin (NEURONTIN) 300 MG capsule Take 1 capsule (300 mg total) by mouth 3 (three) times daily. 10/19/20 10/19/21 Yes KARIN Mittal MD   metoprolol succinate (TOPROL-XL) 200 MG 24 hr tablet Take 200 mg by mouth once daily.   Yes Historical Provider   oxyCODONE-acetaminophen (PERCOCET)  mg per tablet Take 1 tablet by mouth every 4 (four) hours as needed for Pain.   Yes Historical Provider   pantoprazole (PROTONIX) 40 MG tablet Take 40 mg by mouth  "once daily. 10/1/20  Yes Historical Provider   polycarbophil (FIBERCON) 625 mg tablet Take 1,250 mg by mouth once daily.   Yes Historical Provider   tamsulosin (FLOMAX) 0.4 mg Cp24 Take 0.4 mg by mouth once daily.   Yes Historical Provider   testosterone cypionate (DEPOTESTOTERONE CYPIONATE) 200 mg/mL injection testosterone cypionate 200 mg/mL intramuscular oil   Yes Historical Provider   ALPRAZolam (XANAX) 0.5 MG tablet Take 0.5 mg by mouth once daily.    Historical Provider   cholecalciferol, vitamin D3, (VITAMIN D3) 4,000 unit Cap Take 4,000 Units by mouth once daily.     Historical Provider   docusate sodium (COLACE) 100 MG capsule Take 100 mg by mouth 2 (two) times daily.    Historical Provider   HYDROcodone-acetaminophen (NORCO) 5-325 mg per tablet Take 1 tablet by mouth every 6 (six) hours as needed for Pain.  Patient not taking: Reported on 7/2/2020 5/19/20   Kinga Lee MD   LIDOCAINE 2 %, VALIUM 5 MG, BACLOFEN 4 % SUPPOSITORY lidocaine/baclofen/diazepam 2%/4mg/2mg suppository   Insert suppository rectally twice daily as needed.    Historical Provider   LIDOCAINE 2 %, VALIUM 5 MG, BACLOFEN 4 % SUPPOSITORY Insert 1 suppository rectally three times daily as directed. 10/13/20   Historical Provider   sodium,potassium,mag sulfates (SUPREP BOWEL PREP KIT) 17.5-3.13-1.6 gram SolR Suprep Bowel Prep Kit 17.5 gram-3.13 gram-1.6 gram oral solution   Use as directed. Dispense as stock.    Historical Provider   syringe with needle, safety (BD INTEGRA SYRINGE) 3 mL 23 gauge x 1" Syrg BD Integra Syringe 3 mL 23 gauge x 1"   FOR MEDICATION ADMINISTRATION EVERY 2 WEEKS IM 90 DAYS    Historical Provider     Anticoagulants/Antiplatelets: held ASA x 7 days    Allergies:   Review of patient's allergies indicates:   Allergen Reactions    Penicillins Shortness Of Breath and Rash     Sedation History:  no adverse reactions    Review of Systems:   Hematological: no known coagulopathies  Respiratory: no shortness of " breath  Cardiovascular: no chest pain  Gastrointestinal: no abdominal pain  Genito-Urinary: no dysuria  Musculoskeletal: negative  Neurological: no TIA or stroke symptoms         OBJECTIVE:     Vital Signs (Most Recent)  Temp: 97.6 °F (36.4 °C) (11/09/20 1311)  Pulse: 89 (11/09/20 1311)  Resp: 18 (11/09/20 1311)  BP: (!) 160/100 (11/09/20 1311)  SpO2: 99 % (11/09/20 1311)    Physical Exam:  ASA: 2  Mallampati: 3    General: no acute distress  Mental Status: alert and oriented to person, place and time  HEENT: normocephalic, atraumatic  Chest: unlabored breathing  Heart: regular heart rate  Abdomen: nondistended  Extremity: moves all extremities    Laboratory  Lab Results   Component Value Date    INR 1.0 11/09/2020       Lab Results   Component Value Date    WBC 8.77 11/09/2020    HGB 17.3 11/09/2020    HCT 54.9 (H) 11/09/2020    MCV 88 11/09/2020     11/09/2020      Lab Results   Component Value Date     (H) 09/29/2020     09/29/2020    K 4.5 09/29/2020     09/29/2020    CO2 28 09/29/2020    BUN 13 09/29/2020    CREATININE 0.90 09/29/2020    CALCIUM 9.4 09/29/2020    MG 2.2 06/15/2020    ALT 27 09/29/2020    AST 29 09/29/2020    ALBUMIN 4.0 09/29/2020    BILITOT 0.5 09/29/2020       ASSESSMENT/PLAN:     Sedation Plan: up to moderate    Patient will undergo biopsy lower R rectus mass.    Feroz Hall MD  PGY-2  RADIOLOGY

## 2020-11-09 NOTE — CARE UPDATE
Patient states full understanding of discharge instructions. Sl removed without s/s of bleeding or hematoma. Patient to leave with escort to meet family in front of [facility].

## 2020-11-11 LAB
ADEQUACY: NORMAL
FINAL PATHOLOGIC DIAGNOSIS: NORMAL
GROSS: NORMAL
MICROSCOPIC EXAM: NORMAL

## 2020-11-12 ENCOUNTER — TELEPHONE (OUTPATIENT)
Dept: SURGERY | Facility: CLINIC | Age: 50
End: 2020-11-12

## 2020-11-12 DIAGNOSIS — C18.7 CANCER OF SIGMOID COLON: Primary | ICD-10-CM

## 2020-11-16 ENCOUNTER — HOSPITAL ENCOUNTER (OUTPATIENT)
Dept: RADIOLOGY | Facility: HOSPITAL | Age: 50
Discharge: HOME | End: 2020-11-16
Attending: COLON & RECTAL SURGERY
Payer: COMMERCIAL

## 2020-11-16 ENCOUNTER — PATIENT MESSAGE (OUTPATIENT)
Dept: HEMATOLOGY/ONCOLOGY | Facility: CLINIC | Age: 50
End: 2020-11-16

## 2020-11-16 DIAGNOSIS — C18.7 CANCER OF SIGMOID COLON: ICD-10-CM

## 2020-11-16 LAB — POCT GLUCOSE: 108 MG/DL (ref 70–110)

## 2020-11-16 PROCEDURE — 78815 PET IMAGE W/CT SKULL-THIGH: CPT | Mod: TC

## 2020-11-16 PROCEDURE — A9552 F18 FDG: HCPCS

## 2020-11-16 PROCEDURE — 78815 PET IMAGE W/CT SKULL-THIGH: CPT | Mod: 26,PI,, | Performed by: RADIOLOGY

## 2020-11-16 PROCEDURE — 78815 NM PET CT ROUTINE: ICD-10-PCS | Mod: 26,PI,, | Performed by: RADIOLOGY

## 2020-11-18 ENCOUNTER — DOCUMENTATION ONLY (OUTPATIENT)
Dept: SURGERY | Facility: HOSPITAL | Age: 50
End: 2020-11-18

## 2020-11-18 DIAGNOSIS — C18.9 MALIGNANT NEOPLASM OF COLON METASTATIC TO PERITONEUM: Primary | ICD-10-CM

## 2020-11-18 DIAGNOSIS — R22.2 ABDOMINAL WALL MASS: ICD-10-CM

## 2020-11-18 DIAGNOSIS — C78.6 MALIGNANT NEOPLASM OF COLON METASTATIC TO PERITONEUM: Primary | ICD-10-CM

## 2020-11-18 NOTE — PROGRESS NOTES
Multidisciplinary Colon and Rectal Cancer Conference - Treatment Outcome Discussion Summary  11/18/2020  Fredis Ugarte  [id number]  50 y.o. male      1. Surgical Treatment    50M with history of sigmoid colon cancer.     8/2/17: Lap assisted left hemicolectomy. Final pathology stage T3N0.    9/27/18: - Colonoscopy with anastomotic recurrence.    10/22/18: Re-resection, path T3N1a 1/16 LN+.    2. Adjuvant Therapy    FOLFOX completed 6/26/19.    3. Post-Therapy Assessment    MRI pelvis obtained for chronic pelvic pain and new lesion in right rectus muscle noted, biopsy proven adenocarcinoma.    4. Further Treatment Recommendation    Given third recurrence, recommended patient for chemotherapy followed by resection. However, patient has not tolerated chemotherapy in the past. Surgical resection will involve resection of rectus as well as periosteum of pubis. Will discuss with Plastic Surgery and possibly Orthopedic Surgery for reconstructive options. Will also refer to Radiation Oncology for discussion of possible adjuvant therapy. Will continue to discuss chemotherapy options.

## 2020-11-27 ENCOUNTER — TELEPHONE (OUTPATIENT)
Dept: SURGERY | Facility: CLINIC | Age: 50
End: 2020-11-27

## 2020-11-27 NOTE — TELEPHONE ENCOUNTER
Left message that I was calling to give him preop instructions. I also wanted to know if he lives greater than 50 miles from [facility]. A Covid has to be done before surgery but if pt lives greater than 50 miles it can be done the morning of surgery.

## 2020-12-01 ENCOUNTER — TELEPHONE (OUTPATIENT)
Dept: HEMATOLOGY/ONCOLOGY | Facility: CLINIC | Age: 50
End: 2020-12-01

## 2020-12-01 NOTE — TELEPHONE ENCOUNTER
----- Message from Naila Caldwell sent at 12/1/2020 10:34 AM CST -----  Regarding: return call  Contact: Patient/[phone] (home)  Type:  Patient Returning Call    Who Called:  Patient/[phone] (home)     Who Left Message for Patient:  n/a  Does the patient know what this is regarding?:  compound prescription    Additional Information:  Was speaking to one of the nurses about a prescription that office was supposed to call in but did not. The phone was cut off and the nurse did not call back.      [facility]  [address] [place] [address]   [phone]  Fax [phone]    Please call to advise patient when completed.

## 2020-12-08 ENCOUNTER — PATIENT MESSAGE (OUTPATIENT)
Dept: HEMATOLOGY/ONCOLOGY | Facility: CLINIC | Age: 50
End: 2020-12-08

## 2020-12-10 ENCOUNTER — PATIENT MESSAGE (OUTPATIENT)
Dept: HEMATOLOGY/ONCOLOGY | Facility: CLINIC | Age: 50
End: 2020-12-10

## 2020-12-11 ENCOUNTER — PATIENT MESSAGE (OUTPATIENT)
Dept: HEMATOLOGY/ONCOLOGY | Facility: CLINIC | Age: 50
End: 2020-12-11

## 2020-12-17 ENCOUNTER — OFFICE VISIT (OUTPATIENT)
Dept: HEMATOLOGY/ONCOLOGY | Facility: CLINIC | Age: 50
End: 2020-12-17
Payer: COMMERCIAL

## 2020-12-17 ENCOUNTER — PATIENT MESSAGE (OUTPATIENT)
Dept: SURGERY | Facility: HOSPITAL | Age: 50
End: 2020-12-17

## 2020-12-17 ENCOUNTER — PATIENT MESSAGE (OUTPATIENT)
Dept: HEMATOLOGY/ONCOLOGY | Facility: CLINIC | Age: 50
End: 2020-12-17

## 2020-12-17 DIAGNOSIS — R53.83 FATIGUE, UNSPECIFIED TYPE: ICD-10-CM

## 2020-12-17 DIAGNOSIS — G89.3 CANCER ASSOCIATED PAIN: ICD-10-CM

## 2020-12-17 DIAGNOSIS — F32.A DEPRESSION, UNSPECIFIED DEPRESSION TYPE: ICD-10-CM

## 2020-12-17 DIAGNOSIS — C18.9 ADENOCARCINOMA OF COLON: Primary | ICD-10-CM

## 2020-12-17 DIAGNOSIS — E66.01 CLASS 3 SEVERE OBESITY WITHOUT SERIOUS COMORBIDITY WITH BODY MASS INDEX (BMI) OF 40.0 TO 44.9 IN ADULT, UNSPECIFIED OBESITY TYPE: ICD-10-CM

## 2020-12-17 PROCEDURE — 99215 OFFICE O/P EST HI 40 MIN: CPT | Mod: 95,,, | Performed by: INTERNAL MEDICINE

## 2020-12-17 PROCEDURE — 99215 PR OFFICE/OUTPT VISIT, EST, LEVL V, 40-54 MIN: ICD-10-PCS | Mod: 95,,, | Performed by: INTERNAL MEDICINE

## 2020-12-17 RX ORDER — MORPHINE SULFATE 15 MG/1
15 TABLET, FILM COATED, EXTENDED RELEASE ORAL 2 TIMES DAILY
Qty: 60 TABLET | Refills: 0 | Status: SHIPPED | OUTPATIENT
Start: 2020-12-17 | End: 2021-01-30 | Stop reason: SDUPTHER

## 2020-12-17 NOTE — Clinical Note
Surgery 12/28    He needs Letter that says stage 4 cancer, will require complex surgery with prolonged recovery as well as chemotherapy. he is applying for disability.    MD vis 3 weeks after surgery. Can be telemedicine 40 minutes.   Await strata results - marti update?

## 2020-12-18 ENCOUNTER — PATIENT MESSAGE (OUTPATIENT)
Dept: HEMATOLOGY/ONCOLOGY | Facility: CLINIC | Age: 50
End: 2020-12-18

## 2020-12-18 RX ORDER — METRONIDAZOLE 500 MG/1
TABLET ORAL
Qty: 3 TABLET | Refills: 0 | Status: ON HOLD | OUTPATIENT
Start: 2020-12-18 | End: 2020-12-31 | Stop reason: HOSPADM

## 2020-12-18 RX ORDER — POLYETHYLENE GLYCOL 3350 17 G/17G
POWDER, FOR SOLUTION ORAL
Qty: 238 G | Refills: 0 | Status: ON HOLD | OUTPATIENT
Start: 2020-12-18 | End: 2020-12-31 | Stop reason: HOSPADM

## 2020-12-18 RX ORDER — NEOMYCIN SULFATE 500 MG/1
TABLET ORAL
Qty: 6 TABLET | Refills: 0 | Status: ON HOLD | OUTPATIENT
Start: 2020-12-18 | End: 2020-12-31 | Stop reason: HOSPADM

## 2020-12-18 NOTE — TELEPHONE ENCOUNTER
----- Message from KARIN Mittal MD sent at 12/17/2020 11:33 AM CST -----  Can he be called about his surgery for preop instructions and get set up for a full bowel prep with abx?    THANK YOU!    Dane

## 2020-12-18 NOTE — TELEPHONE ENCOUNTER
Called pt about his bowel prep for surgery. He wants it e-mailed to him. Told him the antibiotics that are part of the bowel prep will be sent to his pharmacy. Instructed him to call me if he has questions once he starts reading the prep.

## 2020-12-24 ENCOUNTER — TELEPHONE (OUTPATIENT)
Dept: HEMATOLOGY/ONCOLOGY | Facility: CLINIC | Age: 50
End: 2020-12-24

## 2020-12-24 ENCOUNTER — PATIENT MESSAGE (OUTPATIENT)
Dept: HEMATOLOGY/ONCOLOGY | Facility: CLINIC | Age: 50
End: 2020-12-24

## 2020-12-24 ENCOUNTER — TELEPHONE (OUTPATIENT)
Dept: SURGERY | Facility: CLINIC | Age: 50
End: 2020-12-24

## 2020-12-24 NOTE — TELEPHONE ENCOUNTER
auth received for [facility] through 12/24/2021.  auth number [id number].  Pharm to call pt to  as soon as ready.  Will process it now

## 2020-12-27 ENCOUNTER — ANESTHESIA EVENT (OUTPATIENT)
Dept: SURGERY | Facility: HOSPITAL | Age: 50
DRG: 829 | End: 2020-12-27
Payer: COMMERCIAL

## 2020-12-28 ENCOUNTER — HOSPITAL ENCOUNTER (INPATIENT)
Facility: HOSPITAL | Age: 50
LOS: 3 days | Discharge: HOME | DRG: 829 | End: 2020-12-31
Attending: COLON & RECTAL SURGERY | Admitting: COLON & RECTAL SURGERY
Payer: COMMERCIAL

## 2020-12-28 ENCOUNTER — ANESTHESIA (OUTPATIENT)
Dept: SURGERY | Facility: HOSPITAL | Age: 50
DRG: 829 | End: 2020-12-28
Payer: COMMERCIAL

## 2020-12-28 DIAGNOSIS — R00.0 TACHYCARDIA: ICD-10-CM

## 2020-12-28 DIAGNOSIS — K59.03 DRUG-INDUCED CONSTIPATION: ICD-10-CM

## 2020-12-28 DIAGNOSIS — C18.9 ADENOCARCINOMA OF COLON: Primary | ICD-10-CM

## 2020-12-28 DIAGNOSIS — I82.622 ACUTE DEEP VEIN THROMBOSIS (DVT) OF LEFT UPPER EXTREMITY, UNSPECIFIED VEIN: ICD-10-CM

## 2020-12-28 DIAGNOSIS — K62.89 RECTAL MASS: ICD-10-CM

## 2020-12-28 DIAGNOSIS — E66.01 CLASS 3 SEVERE OBESITY WITHOUT SERIOUS COMORBIDITY WITH BODY MASS INDEX (BMI) OF 40.0 TO 44.9 IN ADULT, UNSPECIFIED OBESITY TYPE: ICD-10-CM

## 2020-12-28 PROBLEM — C78.6: Status: ACTIVE | Noted: 2020-12-28

## 2020-12-28 PROBLEM — R22.2 ABDOMINAL WALL MASS: Status: ACTIVE | Noted: 2020-12-28

## 2020-12-28 LAB
ABO + RH BLD: NORMAL
BLD GP AB SCN CELLS X3 SERPL QL: NORMAL
POCT GLUCOSE: 118 MG/DL (ref 70–110)
SARS-COV-2 RDRP RESP QL NAA+PROBE: NEGATIVE

## 2020-12-28 PROCEDURE — 36000707: Performed by: COLON & RECTAL SURGERY

## 2020-12-28 PROCEDURE — C1781 MESH (IMPLANTABLE): HCPCS | Performed by: COLON & RECTAL SURGERY

## 2020-12-28 PROCEDURE — D9220A PRA ANESTHESIA: Mod: ANES,,, | Performed by: ANESTHESIOLOGY

## 2020-12-28 PROCEDURE — 0437T PR INSERT NON-BIO/SYN IMPLANT, FASCIAL REINFORCEMENTOF ABDOMINAL WALL: ICD-10-PCS | Mod: ,,, | Performed by: COLON & RECTAL SURGERY

## 2020-12-28 PROCEDURE — 25000003 PHARM REV CODE 250: Performed by: SURGERY

## 2020-12-28 PROCEDURE — 0437T IMPLTJ SYNTH RNFCMT ABDL WAL: CPT | Mod: ,,, | Performed by: COLON & RECTAL SURGERY

## 2020-12-28 PROCEDURE — 94770 HC EXHALED C02 TEST: CPT

## 2020-12-28 PROCEDURE — 88304 TISSUE EXAM BY PATHOLOGIST: CPT | Mod: 26,,, | Performed by: PATHOLOGY

## 2020-12-28 PROCEDURE — 25000003 PHARM REV CODE 250: Performed by: NURSE PRACTITIONER

## 2020-12-28 PROCEDURE — 88307 TISSUE EXAM BY PATHOLOGIST: CPT | Performed by: PATHOLOGY

## 2020-12-28 PROCEDURE — 63600175 PHARM REV CODE 636 W HCPCS: Performed by: NURSE ANESTHETIST, CERTIFIED REGISTERED

## 2020-12-28 PROCEDURE — 63600175 PHARM REV CODE 636 W HCPCS: Performed by: SURGERY

## 2020-12-28 PROCEDURE — 82962 GLUCOSE BLOOD TEST: CPT | Performed by: COLON & RECTAL SURGERY

## 2020-12-28 PROCEDURE — 63600175 PHARM REV CODE 636 W HCPCS: Performed by: NURSE PRACTITIONER

## 2020-12-28 PROCEDURE — S0030 INJECTION, METRONIDAZOLE: HCPCS | Performed by: NURSE PRACTITIONER

## 2020-12-28 PROCEDURE — U0002 COVID-19 LAB TEST NON-CDC: HCPCS

## 2020-12-28 PROCEDURE — C1713 ANCHOR/SCREW BN/BN,TIS/BN: HCPCS | Performed by: COLON & RECTAL SURGERY

## 2020-12-28 PROCEDURE — 63600175 PHARM REV CODE 636 W HCPCS: Performed by: COLON & RECTAL SURGERY

## 2020-12-28 PROCEDURE — 94761 N-INVAS EAR/PLS OXIMETRY MLT: CPT

## 2020-12-28 PROCEDURE — 49203 PR EXCISION/DESTRUCTION OPEN ABDOMINAL TUMORS 5 CM: CPT | Mod: ,,, | Performed by: COLON & RECTAL SURGERY

## 2020-12-28 PROCEDURE — 88307 TISSUE EXAM BY PATHOLOGIST: CPT | Mod: 26,,, | Performed by: PATHOLOGY

## 2020-12-28 PROCEDURE — 36000706: Performed by: COLON & RECTAL SURGERY

## 2020-12-28 PROCEDURE — 25000003 PHARM REV CODE 250: Performed by: STUDENT IN AN ORGANIZED HEALTH CARE EDUCATION/TRAINING PROGRAM

## 2020-12-28 PROCEDURE — 37000009 HC ANESTHESIA EA ADD 15 MINS: Performed by: COLON & RECTAL SURGERY

## 2020-12-28 PROCEDURE — D9220A PRA ANESTHESIA: Mod: CRNA,,, | Performed by: NURSE ANESTHETIST, CERTIFIED REGISTERED

## 2020-12-28 PROCEDURE — 63600175 PHARM REV CODE 636 W HCPCS

## 2020-12-28 PROCEDURE — 71000015 HC POSTOP RECOV 1ST HR: Performed by: COLON & RECTAL SURGERY

## 2020-12-28 PROCEDURE — 20600001 HC STEP DOWN PRIVATE ROOM

## 2020-12-28 PROCEDURE — 99900035 HC TECH TIME PER 15 MIN (STAT)

## 2020-12-28 PROCEDURE — 49203 PR EXCISION/DESTRUCTION OPEN ABDOMINAL TUMORS 5 CM: ICD-10-PCS | Mod: ,,, | Performed by: COLON & RECTAL SURGERY

## 2020-12-28 PROCEDURE — 25000003 PHARM REV CODE 250: Performed by: NURSE ANESTHETIST, CERTIFIED REGISTERED

## 2020-12-28 PROCEDURE — 37000008 HC ANESTHESIA 1ST 15 MINUTES: Performed by: COLON & RECTAL SURGERY

## 2020-12-28 PROCEDURE — 27000221 HC OXYGEN, UP TO 24 HOURS

## 2020-12-28 PROCEDURE — 86900 BLOOD TYPING SEROLOGIC ABO: CPT

## 2020-12-28 PROCEDURE — 88304 PR  SURG PATH,LEVEL III: ICD-10-PCS | Mod: 26,,, | Performed by: PATHOLOGY

## 2020-12-28 PROCEDURE — 88307 PR  SURG PATH,LEVEL V: ICD-10-PCS | Mod: 26,,, | Performed by: PATHOLOGY

## 2020-12-28 PROCEDURE — 27201423 OPTIME MED/SURG SUP & DEVICES STERILE SUPPLY: Performed by: COLON & RECTAL SURGERY

## 2020-12-28 PROCEDURE — 71000033 HC RECOVERY, INTIAL HOUR: Performed by: COLON & RECTAL SURGERY

## 2020-12-28 PROCEDURE — 88304 TISSUE EXAM BY PATHOLOGIST: CPT | Performed by: PATHOLOGY

## 2020-12-28 PROCEDURE — 63600175 PHARM REV CODE 636 W HCPCS: Performed by: STUDENT IN AN ORGANIZED HEALTH CARE EDUCATION/TRAINING PROGRAM

## 2020-12-28 PROCEDURE — D9220A PRA ANESTHESIA: ICD-10-PCS | Mod: CRNA,,, | Performed by: NURSE ANESTHETIST, CERTIFIED REGISTERED

## 2020-12-28 PROCEDURE — D9220A PRA ANESTHESIA: ICD-10-PCS | Mod: ANES,,, | Performed by: ANESTHESIOLOGY

## 2020-12-28 DEVICE — MESH PROCEED 6X8: Type: IMPLANTABLE DEVICE | Site: PELVIS | Status: FUNCTIONAL

## 2020-12-28 DEVICE — ANCHOR SUTURETAK BIOCOMP SMALL: Type: IMPLANTABLE DEVICE | Site: PELVIS | Status: FUNCTIONAL

## 2020-12-28 RX ORDER — PROPOFOL 10 MG/ML
VIAL (ML) INTRAVENOUS
Status: DISCONTINUED | OUTPATIENT
Start: 2020-12-28 | End: 2020-12-28

## 2020-12-28 RX ORDER — MIDAZOLAM HYDROCHLORIDE 1 MG/ML
INJECTION, SOLUTION INTRAMUSCULAR; INTRAVENOUS
Status: DISCONTINUED | OUTPATIENT
Start: 2020-12-28 | End: 2020-12-28

## 2020-12-28 RX ORDER — LIDOCAINE HYDROCHLORIDE 10 MG/ML
1 INJECTION, SOLUTION EPIDURAL; INFILTRATION; INTRACAUDAL; PERINEURAL
Status: DISCONTINUED | OUTPATIENT
Start: 2020-12-28 | End: 2020-12-28

## 2020-12-28 RX ORDER — HYDROMORPHONE HYDROCHLORIDE 1 MG/ML
0.2 INJECTION, SOLUTION INTRAMUSCULAR; INTRAVENOUS; SUBCUTANEOUS EVERY 5 MIN PRN
Status: DISCONTINUED | OUTPATIENT
Start: 2020-12-28 | End: 2020-12-28 | Stop reason: HOSPADM

## 2020-12-28 RX ORDER — ONDANSETRON 2 MG/ML
4 INJECTION INTRAMUSCULAR; INTRAVENOUS EVERY 6 HOURS PRN
Status: DISCONTINUED | OUTPATIENT
Start: 2020-12-28 | End: 2020-12-31 | Stop reason: HOSPADM

## 2020-12-28 RX ORDER — SODIUM CHLORIDE 9 MG/ML
INJECTION, SOLUTION INTRAVENOUS CONTINUOUS
Status: DISCONTINUED | OUTPATIENT
Start: 2020-12-28 | End: 2020-12-30

## 2020-12-28 RX ORDER — DEXAMETHASONE SODIUM PHOSPHATE 4 MG/ML
INJECTION, SOLUTION INTRA-ARTICULAR; INTRALESIONAL; INTRAMUSCULAR; INTRAVENOUS; SOFT TISSUE
Status: DISCONTINUED | OUTPATIENT
Start: 2020-12-28 | End: 2020-12-28

## 2020-12-28 RX ORDER — PANTOPRAZOLE SODIUM 40 MG/1
40 TABLET, DELAYED RELEASE ORAL DAILY
Status: DISCONTINUED | OUTPATIENT
Start: 2020-12-29 | End: 2020-12-31 | Stop reason: HOSPADM

## 2020-12-28 RX ORDER — OXYCODONE AND ACETAMINOPHEN 10; 325 MG/1; MG/1
1 TABLET ORAL EVERY 4 HOURS PRN
Status: DISCONTINUED | OUTPATIENT
Start: 2020-12-28 | End: 2020-12-30

## 2020-12-28 RX ORDER — FENTANYL CITRATE 50 UG/ML
50 INJECTION, SOLUTION INTRAMUSCULAR; INTRAVENOUS EVERY 5 MIN PRN
Status: DISCONTINUED | OUTPATIENT
Start: 2020-12-28 | End: 2020-12-28 | Stop reason: HOSPADM

## 2020-12-28 RX ORDER — LIDOCAINE HYDROCHLORIDE 20 MG/ML
INJECTION, SOLUTION EPIDURAL; INFILTRATION; INTRACAUDAL; PERINEURAL
Status: DISCONTINUED | OUTPATIENT
Start: 2020-12-28 | End: 2020-12-28

## 2020-12-28 RX ORDER — ALVIMOPAN 12 MG/1
12 CAPSULE ORAL ONCE
Status: COMPLETED | OUTPATIENT
Start: 2020-12-28 | End: 2020-12-28

## 2020-12-28 RX ORDER — LIDOCAINE HYDROCHLORIDE ANHYDROUS AND DEXTROSE MONOHYDRATE .8; 5 G/100ML; G/100ML
INJECTION, SOLUTION INTRAVENOUS CONTINUOUS PRN
Status: DISCONTINUED | OUTPATIENT
Start: 2020-12-28 | End: 2020-12-28

## 2020-12-28 RX ORDER — GABAPENTIN 300 MG/1
300 CAPSULE ORAL 3 TIMES DAILY
Status: DISCONTINUED | OUTPATIENT
Start: 2020-12-28 | End: 2020-12-28

## 2020-12-28 RX ORDER — ACETAMINOPHEN 500 MG
1000 TABLET ORAL EVERY 8 HOURS
Status: DISCONTINUED | OUTPATIENT
Start: 2020-12-29 | End: 2020-12-31 | Stop reason: HOSPADM

## 2020-12-28 RX ORDER — MORPHINE SULFATE 15 MG/1
15 TABLET, FILM COATED, EXTENDED RELEASE ORAL 2 TIMES DAILY
Status: DISCONTINUED | OUTPATIENT
Start: 2020-12-28 | End: 2020-12-31

## 2020-12-28 RX ORDER — MUPIROCIN 20 MG/G
OINTMENT TOPICAL 2 TIMES DAILY
Status: DISCONTINUED | OUTPATIENT
Start: 2020-12-28 | End: 2020-12-31 | Stop reason: HOSPADM

## 2020-12-28 RX ORDER — CYCLOBENZAPRINE HCL 5 MG
5 TABLET ORAL 3 TIMES DAILY PRN
Status: DISCONTINUED | OUTPATIENT
Start: 2020-12-28 | End: 2020-12-30

## 2020-12-28 RX ORDER — ENOXAPARIN SODIUM 100 MG/ML
40 INJECTION SUBCUTANEOUS EVERY 24 HOURS
Status: DISCONTINUED | OUTPATIENT
Start: 2020-12-28 | End: 2020-12-31

## 2020-12-28 RX ORDER — METRONIDAZOLE 500 MG/100ML
500 INJECTION, SOLUTION INTRAVENOUS
Status: COMPLETED | OUTPATIENT
Start: 2020-12-28 | End: 2020-12-28

## 2020-12-28 RX ORDER — SODIUM CHLORIDE 0.9 % (FLUSH) 0.9 %
3 SYRINGE (ML) INJECTION
Status: DISCONTINUED | OUTPATIENT
Start: 2020-12-28 | End: 2020-12-28 | Stop reason: HOSPADM

## 2020-12-28 RX ORDER — ROCURONIUM BROMIDE 10 MG/ML
INJECTION, SOLUTION INTRAVENOUS
Status: DISCONTINUED | OUTPATIENT
Start: 2020-12-28 | End: 2020-12-28

## 2020-12-28 RX ORDER — MUPIROCIN 20 MG/G
1 OINTMENT TOPICAL
Status: COMPLETED | OUTPATIENT
Start: 2020-12-28 | End: 2020-12-28

## 2020-12-28 RX ORDER — DIPHENHYDRAMINE HYDROCHLORIDE 50 MG/ML
25 INJECTION INTRAMUSCULAR; INTRAVENOUS EVERY 6 HOURS PRN
Status: DISCONTINUED | OUTPATIENT
Start: 2020-12-28 | End: 2020-12-31 | Stop reason: HOSPADM

## 2020-12-28 RX ORDER — TAMSULOSIN HYDROCHLORIDE 0.4 MG/1
0.4 CAPSULE ORAL DAILY
Status: DISCONTINUED | OUTPATIENT
Start: 2020-12-29 | End: 2020-12-31 | Stop reason: HOSPADM

## 2020-12-28 RX ORDER — HEPARIN SODIUM 5000 [USP'U]/ML
5000 INJECTION, SOLUTION INTRAVENOUS; SUBCUTANEOUS EVERY 8 HOURS
Status: COMPLETED | OUTPATIENT
Start: 2020-12-28 | End: 2020-12-28

## 2020-12-28 RX ORDER — SODIUM CHLORIDE 9 MG/ML
INJECTION, SOLUTION INTRAVENOUS CONTINUOUS
Status: DISCONTINUED | OUTPATIENT
Start: 2020-12-28 | End: 2020-12-29

## 2020-12-28 RX ORDER — POLYETHYLENE GLYCOL 3350 17 G/17G
17 POWDER, FOR SOLUTION ORAL DAILY
Status: DISCONTINUED | OUTPATIENT
Start: 2020-12-29 | End: 2020-12-31 | Stop reason: HOSPADM

## 2020-12-28 RX ORDER — ACETAMINOPHEN 650 MG/20.3ML
975 LIQUID ORAL
Status: COMPLETED | OUTPATIENT
Start: 2020-12-28 | End: 2020-12-28

## 2020-12-28 RX ORDER — ACETAMINOPHEN 10 MG/ML
INJECTION, SOLUTION INTRAVENOUS
Status: DISCONTINUED | OUTPATIENT
Start: 2020-12-28 | End: 2020-12-28

## 2020-12-28 RX ORDER — GABAPENTIN 300 MG/1
300 CAPSULE ORAL
Status: COMPLETED | OUTPATIENT
Start: 2020-12-28 | End: 2020-12-28

## 2020-12-28 RX ORDER — HYDROMORPHONE HYDROCHLORIDE 2 MG/ML
INJECTION, SOLUTION INTRAMUSCULAR; INTRAVENOUS; SUBCUTANEOUS
Status: DISCONTINUED | OUTPATIENT
Start: 2020-12-28 | End: 2020-12-28

## 2020-12-28 RX ORDER — NALOXONE HCL 0.4 MG/ML
0.02 VIAL (ML) INJECTION
Status: DISCONTINUED | OUTPATIENT
Start: 2020-12-28 | End: 2020-12-31 | Stop reason: HOSPADM

## 2020-12-28 RX ORDER — IBUPROFEN 400 MG/1
800 TABLET ORAL EVERY 8 HOURS
Status: DISCONTINUED | OUTPATIENT
Start: 2020-12-29 | End: 2020-12-31 | Stop reason: HOSPADM

## 2020-12-28 RX ORDER — ONDANSETRON 2 MG/ML
INJECTION INTRAMUSCULAR; INTRAVENOUS
Status: DISCONTINUED | OUTPATIENT
Start: 2020-12-28 | End: 2020-12-28

## 2020-12-28 RX ORDER — METHYLENE BLUE 5 MG/ML
INJECTION INTRAVENOUS
Status: DISCONTINUED | OUTPATIENT
Start: 2020-12-28 | End: 2020-12-28 | Stop reason: HOSPADM

## 2020-12-28 RX ORDER — GABAPENTIN 300 MG/1
300 CAPSULE ORAL 3 TIMES DAILY
Status: DISCONTINUED | OUTPATIENT
Start: 2020-12-28 | End: 2020-12-30

## 2020-12-28 RX ORDER — ACETAMINOPHEN 10 MG/ML
1000 INJECTION, SOLUTION INTRAVENOUS EVERY 8 HOURS
Status: DISPENSED | OUTPATIENT
Start: 2020-12-28 | End: 2020-12-29

## 2020-12-28 RX ORDER — ONDANSETRON 2 MG/ML
4 INJECTION INTRAMUSCULAR; INTRAVENOUS EVERY 6 HOURS PRN
Status: DISCONTINUED | OUTPATIENT
Start: 2020-12-28 | End: 2020-12-28

## 2020-12-28 RX ORDER — HYDROMORPHONE HCL IN 0.9% NACL 6 MG/30 ML
PATIENT CONTROLLED ANALGESIA SYRINGE INTRAVENOUS
Status: COMPLETED
Start: 2020-12-28 | End: 2020-12-28

## 2020-12-28 RX ORDER — TRIPROLIDINE/PSEUDOEPHEDRINE 2.5MG-60MG
600 TABLET ORAL
Status: COMPLETED | OUTPATIENT
Start: 2020-12-28 | End: 2020-12-28

## 2020-12-28 RX ORDER — FENTANYL CITRATE 50 UG/ML
INJECTION, SOLUTION INTRAMUSCULAR; INTRAVENOUS
Status: DISCONTINUED | OUTPATIENT
Start: 2020-12-28 | End: 2020-12-28

## 2020-12-28 RX ORDER — KETAMINE HCL IN 0.9 % NACL 50 MG/5 ML
SYRINGE (ML) INTRAVENOUS
Status: DISCONTINUED | OUTPATIENT
Start: 2020-12-28 | End: 2020-12-28

## 2020-12-28 RX ORDER — SODIUM CHLORIDE 9 MG/ML
INJECTION, SOLUTION INTRAVENOUS
Status: DISCONTINUED | OUTPATIENT
Start: 2020-12-28 | End: 2020-12-28

## 2020-12-28 RX ORDER — SODIUM CHLORIDE 0.9 % (FLUSH) 0.9 %
10 SYRINGE (ML) INJECTION
Status: DISCONTINUED | OUTPATIENT
Start: 2020-12-28 | End: 2020-12-31 | Stop reason: HOSPADM

## 2020-12-28 RX ORDER — HYDROMORPHONE HCL IN 0.9% NACL 6 MG/30 ML
PATIENT CONTROLLED ANALGESIA SYRINGE INTRAVENOUS CONTINUOUS
Status: DISCONTINUED | OUTPATIENT
Start: 2020-12-28 | End: 2020-12-31

## 2020-12-28 RX ORDER — METOPROLOL SUCCINATE 100 MG/1
200 TABLET, EXTENDED RELEASE ORAL DAILY
Status: DISCONTINUED | OUTPATIENT
Start: 2020-12-29 | End: 2020-12-31 | Stop reason: HOSPADM

## 2020-12-28 RX ADMIN — Medication 6 MG: at 04:12

## 2020-12-28 RX ADMIN — ALVIMOPAN 12 MG: 12 CAPSULE ORAL at 10:12

## 2020-12-28 RX ADMIN — IBUPROFEN 600 MG: 100 SUSPENSION ORAL at 10:12

## 2020-12-28 RX ADMIN — ENOXAPARIN SODIUM 40 MG: 40 INJECTION SUBCUTANEOUS at 05:12

## 2020-12-28 RX ADMIN — HYDROMORPHONE HYDROCHLORIDE 0.4 MG: 2 INJECTION INTRAMUSCULAR; INTRAVENOUS; SUBCUTANEOUS at 03:12

## 2020-12-28 RX ADMIN — GENTAMICIN SULFATE 543.5 MG: 40 INJECTION, SOLUTION INTRAMUSCULAR; INTRAVENOUS at 12:12

## 2020-12-28 RX ADMIN — GLYCOPYRROLATE 0.2 MG: 0.2 INJECTION INTRAMUSCULAR; INTRAVENOUS at 12:12

## 2020-12-28 RX ADMIN — ROCURONIUM BROMIDE 10 MG: 10 INJECTION, SOLUTION INTRAVENOUS at 02:12

## 2020-12-28 RX ADMIN — ACETAMINOPHEN 976.6 MG: 160 SOLUTION ORAL at 10:12

## 2020-12-28 RX ADMIN — ROCURONIUM BROMIDE 50 MG: 10 INJECTION, SOLUTION INTRAVENOUS at 11:12

## 2020-12-28 RX ADMIN — ROCURONIUM BROMIDE 10 MG: 10 INJECTION, SOLUTION INTRAVENOUS at 01:12

## 2020-12-28 RX ADMIN — SODIUM CHLORIDE 40 ML/HR: 0.9 INJECTION, SOLUTION INTRAVENOUS at 04:12

## 2020-12-28 RX ADMIN — HEPARIN SODIUM 5000 UNITS: 5000 INJECTION INTRAVENOUS; SUBCUTANEOUS at 11:12

## 2020-12-28 RX ADMIN — SODIUM CHLORIDE 250 ML: 0.9 INJECTION, SOLUTION INTRAVENOUS at 12:12

## 2020-12-28 RX ADMIN — GABAPENTIN 300 MG: 300 CAPSULE ORAL at 09:12

## 2020-12-28 RX ADMIN — PROPOFOL 200 MG: 10 INJECTION, EMULSION INTRAVENOUS at 11:12

## 2020-12-28 RX ADMIN — MIDAZOLAM 2 MG: 1 INJECTION INTRAMUSCULAR; INTRAVENOUS at 11:12

## 2020-12-28 RX ADMIN — ACETAMINOPHEN 1000 MG: 10 INJECTION, SOLUTION INTRAVENOUS at 09:12

## 2020-12-28 RX ADMIN — GABAPENTIN 300 MG: 300 CAPSULE ORAL at 10:12

## 2020-12-28 RX ADMIN — ROCURONIUM BROMIDE 10 MG: 10 INJECTION, SOLUTION INTRAVENOUS at 03:12

## 2020-12-28 RX ADMIN — SODIUM CHLORIDE 500 ML: 0.9 INJECTION, SOLUTION INTRAVENOUS at 01:12

## 2020-12-28 RX ADMIN — MORPHINE SULFATE 15 MG: 15 TABLET, FILM COATED, EXTENDED RELEASE ORAL at 09:12

## 2020-12-28 RX ADMIN — Medication 10 MG: at 01:12

## 2020-12-28 RX ADMIN — MUPIROCIN 1 G: 20 OINTMENT TOPICAL at 10:12

## 2020-12-28 RX ADMIN — ONDANSETRON 4 MG: 2 INJECTION INTRAMUSCULAR; INTRAVENOUS at 04:12

## 2020-12-28 RX ADMIN — DEXAMETHASONE SODIUM PHOSPHATE 8 MG: 4 INJECTION INTRA-ARTICULAR; INTRALESIONAL; INTRAMUSCULAR; INTRAVENOUS; SOFT TISSUE at 12:12

## 2020-12-28 RX ADMIN — SODIUM CHLORIDE: 0.9 INJECTION, SOLUTION INTRAVENOUS at 10:12

## 2020-12-28 RX ADMIN — PROPOFOL 150 MG: 10 INJECTION, EMULSION INTRAVENOUS at 11:12

## 2020-12-28 RX ADMIN — LIDOCAINE HYDROCHLORIDE 50 MG: 20 INJECTION, SOLUTION EPIDURAL; INFILTRATION; INTRACAUDAL at 11:12

## 2020-12-28 RX ADMIN — SUGAMMADEX 400 MG: 100 INJECTION, SOLUTION INTRAVENOUS at 04:12

## 2020-12-28 RX ADMIN — Medication: at 05:12

## 2020-12-28 RX ADMIN — Medication: at 07:12

## 2020-12-28 RX ADMIN — ROCURONIUM BROMIDE 20 MG: 10 INJECTION, SOLUTION INTRAVENOUS at 12:12

## 2020-12-28 RX ADMIN — FENTANYL CITRATE 100 MCG: 50 INJECTION, SOLUTION INTRAMUSCULAR; INTRAVENOUS at 11:12

## 2020-12-28 RX ADMIN — ACETAMINOPHEN 1000 MG: 10 INJECTION, SOLUTION INTRAVENOUS at 12:12

## 2020-12-28 RX ADMIN — LIDOCAINE HYDROCHLORIDE 0.02 MG/KG/MIN: 8 INJECTION, SOLUTION INTRAVENOUS at 11:12

## 2020-12-28 RX ADMIN — Medication 30 MG: at 12:12

## 2020-12-28 RX ADMIN — Medication 10 MG: at 02:12

## 2020-12-28 RX ADMIN — METRONIDAZOLE 500 MG: 500 INJECTION, SOLUTION INTRAVENOUS at 11:12

## 2020-12-28 RX ADMIN — MUPIROCIN: 20 OINTMENT TOPICAL at 09:12

## 2020-12-28 NOTE — ANESTHESIA PREPROCEDURE EVALUATION
12/28/2020  Fredis Ugarte is a 50 y.o., male.    Anesthesia Evaluation    I have reviewed the Patient Summary Reports.    I have reviewed the Nursing Notes. I have reviewed the NPO Status.   I have reviewed the Medications.     Review of Systems  Anesthesia Hx:  No problems with previous Anesthesia  History of prior surgery of interest to airway management or planning: Denies Family Hx of Anesthesia complications.   Denies Personal Hx of Anesthesia complications.   Hematology/Oncology:        Hematology Comments: H/o DVT Current/Recent Cancer. Oncology Comments: Colon ca    Cardiovascular:   Hypertension Denies MI.  Denies CAD.    ECG has been reviewed.    Pulmonary:  Pulmonary Normal  Denies COPD.  Denies Sleep Apnea.    Renal/:  Renal/ Normal     Hepatic/GI:  Hepatic/GI Normal    Musculoskeletal:  Musculoskeletal Normal    Neurological:   Headaches Denies Seizures.    Endocrine:   Denies Diabetes.        Physical Exam  General:  Well nourished    Airway/Jaw/Neck:  Airway Findings: Mouth Opening: Normal Tongue: Normal  Jaw/Neck Findings:  Micrognathia: Negative Neck ROM: Normal ROM      Dental:  Dental Findings: In tact   Chest/Lungs:  Chest/Lungs Findings: Clear to auscultation, Normal Respiratory Rate     Heart/Vascular:  Heart Findings: Rate: Normal  Rhythm: Regular Rhythm  Sounds: Normal  Heart murmur: negative    Abdomen:  Abdomen Findings:  Normal, Nontender, Soft       Mental Status:  Mental Status Findings:  Cooperative, Alert and Oriented         Anesthesia Plan  Type of Anesthesia, risks & benefits discussed:  Anesthesia Type:  MAC, general  Patient's Preference:   Intra-op Monitoring Plan:   Intra-op Monitoring Plan Comments:   Post Op Pain Control Plan: multimodal analgesia, IV/PO Opioids PRN and per primary service following discharge from PACU  Post Op Pain Control Plan Comments:    Induction:   IV  Beta Blocker:  Patient is not currently on a Beta-Blocker (No further documentation required).       Informed Consent: Patient understands risks and agrees with Anesthesia plan.  Questions answered. Anesthesia consent signed with patient.  ASA Score: 3     Day of Surgery Review of History & Physical:    H&P update referred to the surgeon.         Ready For Surgery From Anesthesia Perspective.

## 2020-12-28 NOTE — ANESTHESIA PROCEDURE NOTES
Intubation  Performed by: Arlene Anderson CRNA  Authorized by: Naeem Howell MD     Intubation:     Induction:  Intravenous    Mask Ventilation:  Easy mask    Attempts:  1    Attempted By:  YOMI    Blade:  Tracy 4    Laryngeal View Grade: Grade IIA - cords partially seen      Difficult Airway Encountered?: No      Complications:  None    Airway Device:  Oral endotracheal tube    Airway Device Size:  7.5    Style/Cuff Inflation:  Cuffed    Inflation Amount (mL):  9    Tube secured:  24    Secured at:  The teeth    Placement Verified By:  Capnometry    Complicating Factors:  Poor neck/head extension, short neck, obesity and small mouth

## 2020-12-28 NOTE — ANESTHESIA POSTPROCEDURE EVALUATION
Anesthesia Post Evaluation    Patient: Fredis Ugarte    Procedure(s) Performed: Procedure(s) (LRB):  EXCISION, MASS, ABDOMEN (N/A)  REPAIR, HERNIA, VENTRAL with mesh placement    Final Anesthesia Type: general      Patient location during evaluation: PACU  Patient participation: Yes- Able to Participate  Level of consciousness: awake and alert  Post-procedure vital signs: reviewed and stable  Pain management: adequate  Airway patency: patent    PONV status at discharge: No PONV  Anesthetic complications: no      Cardiovascular status: blood pressure returned to baseline and hemodynamically stable  Respiratory status: unassisted  Follow-up not needed.          Vitals Value Taken Time   /85 12/28/20 1746   Temp 36.7 °C (98 °F) 12/28/20 1630   Pulse 99 12/28/20 1750   Resp 18 12/28/20 1750   SpO2 91 % 12/28/20 1750   Vitals shown include unvalidated device data.      Event Time   Out of Recovery 17:15:00         Pain/Leslee Score: Pain Rating Prior to Med Admin: 9 (12/28/2020  5:18 PM)  Leslee Score: 9 (12/28/2020  5:15 PM)

## 2020-12-28 NOTE — TRANSFER OF CARE
"Anesthesia Transfer of Care Note    Patient: Fredis Ugarte    Procedure(s) Performed: Procedure(s) (LRB):  EXCISION, MASS, ABDOMEN (N/A)  REPAIR, HERNIA, VENTRAL with mesh placement    Patient location: PACU    Anesthesia Type: general    Transport from OR: Transported from OR on 6-10 L/min O2 by face mask with adequate spontaneous ventilation    Post pain: adequate analgesia    Post assessment: no apparent anesthetic complications and tolerated procedure well    Post vital signs: stable    Level of consciousness: sedated and responds to stimulation    Nausea/Vomiting: no nausea/vomiting    Complications: none    Transfer of care protocol was followedComments: Bedside report to PACU RN, opportunity for questions given.       Last vitals:   Visit Vitals  /70   Pulse 99   Temp 36.7 °C (98 °F) (Temporal)   Resp 20   Ht 6' 3" (1.905 m)   Wt (!) 145 kg (319 lb 10.7 oz)   SpO2 95%   BMI 39.96 kg/m²     "

## 2020-12-29 PROBLEM — E83.39 HYPOPHOSPHATEMIA: Status: ACTIVE | Noted: 2020-12-29

## 2020-12-29 PROBLEM — I10 ESSENTIAL HYPERTENSION: Status: ACTIVE | Noted: 2020-12-29

## 2020-12-29 LAB
ANION GAP SERPL CALC-SCNC: 14 MMOL/L (ref 8–16)
BASOPHILS # BLD AUTO: 0.02 K/UL (ref 0–0.2)
BASOPHILS NFR BLD: 0.1 % (ref 0–1.9)
BUN SERPL-MCNC: 13 MG/DL (ref 6–20)
CALCIUM SERPL-MCNC: 8.3 MG/DL (ref 8.7–10.5)
CHLORIDE SERPL-SCNC: 103 MMOL/L (ref 95–110)
CO2 SERPL-SCNC: 17 MMOL/L (ref 23–29)
CREAT SERPL-MCNC: 0.9 MG/DL (ref 0.5–1.4)
DIFFERENTIAL METHOD: ABNORMAL
EOSINOPHIL # BLD AUTO: 0 K/UL (ref 0–0.5)
EOSINOPHIL NFR BLD: 0.1 % (ref 0–8)
ERYTHROCYTE [DISTWIDTH] IN BLOOD BY AUTOMATED COUNT: 17 % (ref 11.5–14.5)
EST. GFR  (AFRICAN AMERICAN): >60 ML/MIN/1.73 M^2
EST. GFR  (NON AFRICAN AMERICAN): >60 ML/MIN/1.73 M^2
GLUCOSE SERPL-MCNC: 126 MG/DL (ref 70–110)
HCT VFR BLD AUTO: 53.1 % (ref 40–54)
HGB BLD-MCNC: 16.2 G/DL (ref 14–18)
IMM GRANULOCYTES # BLD AUTO: 0.08 K/UL (ref 0–0.04)
IMM GRANULOCYTES NFR BLD AUTO: 0.5 % (ref 0–0.5)
LYMPHOCYTES # BLD AUTO: 0.7 K/UL (ref 1–4.8)
LYMPHOCYTES NFR BLD: 4.9 % (ref 18–48)
MAGNESIUM SERPL-MCNC: 1.8 MG/DL (ref 1.6–2.6)
MCH RBC QN AUTO: 27.2 PG (ref 27–31)
MCHC RBC AUTO-ENTMCNC: 30.5 G/DL (ref 32–36)
MCV RBC AUTO: 89 FL (ref 82–98)
MONOCYTES # BLD AUTO: 1.2 K/UL (ref 0.3–1)
MONOCYTES NFR BLD: 7.8 % (ref 4–15)
NEUTROPHILS # BLD AUTO: 13 K/UL (ref 1.8–7.7)
NEUTROPHILS NFR BLD: 86.6 % (ref 38–73)
NRBC BLD-RTO: 0 /100 WBC
PHOSPHATE SERPL-MCNC: 2.3 MG/DL (ref 2.7–4.5)
PLATELET # BLD AUTO: 263 K/UL (ref 150–350)
PMV BLD AUTO: 10.2 FL (ref 9.2–12.9)
POTASSIUM SERPL-SCNC: 4.8 MMOL/L (ref 3.5–5.1)
RBC # BLD AUTO: 5.96 M/UL (ref 4.6–6.2)
SODIUM SERPL-SCNC: 134 MMOL/L (ref 136–145)
WBC # BLD AUTO: 15.01 K/UL (ref 3.9–12.7)

## 2020-12-29 PROCEDURE — 85025 COMPLETE CBC W/AUTO DIFF WBC: CPT

## 2020-12-29 PROCEDURE — 83735 ASSAY OF MAGNESIUM: CPT

## 2020-12-29 PROCEDURE — 25000003 PHARM REV CODE 250: Performed by: NURSE PRACTITIONER

## 2020-12-29 PROCEDURE — 84100 ASSAY OF PHOSPHORUS: CPT

## 2020-12-29 PROCEDURE — 63600175 PHARM REV CODE 636 W HCPCS: Performed by: COLON & RECTAL SURGERY

## 2020-12-29 PROCEDURE — 97162 PT EVAL MOD COMPLEX 30 MIN: CPT

## 2020-12-29 PROCEDURE — 20600001 HC STEP DOWN PRIVATE ROOM

## 2020-12-29 PROCEDURE — 80048 BASIC METABOLIC PNL TOTAL CA: CPT

## 2020-12-29 PROCEDURE — 97530 THERAPEUTIC ACTIVITIES: CPT

## 2020-12-29 PROCEDURE — 25000003 PHARM REV CODE 250: Performed by: SURGERY

## 2020-12-29 PROCEDURE — 36415 COLL VENOUS BLD VENIPUNCTURE: CPT

## 2020-12-29 PROCEDURE — 63600175 PHARM REV CODE 636 W HCPCS: Performed by: SURGERY

## 2020-12-29 RX ORDER — CALCIUM CARBONATE 200(500)MG
500 TABLET,CHEWABLE ORAL 2 TIMES DAILY PRN
Status: DISCONTINUED | OUTPATIENT
Start: 2020-12-29 | End: 2020-12-31 | Stop reason: HOSPADM

## 2020-12-29 RX ORDER — SODIUM,POTASSIUM PHOSPHATES 280-250MG
1 POWDER IN PACKET (EA) ORAL
Status: COMPLETED | OUTPATIENT
Start: 2020-12-29 | End: 2020-12-29

## 2020-12-29 RX ORDER — METOPROLOL TARTRATE 1 MG/ML
5 INJECTION, SOLUTION INTRAVENOUS EVERY 8 HOURS PRN
Status: DISCONTINUED | OUTPATIENT
Start: 2020-12-29 | End: 2020-12-30

## 2020-12-29 RX ADMIN — PANTOPRAZOLE SODIUM 40 MG: 40 TABLET, DELAYED RELEASE ORAL at 09:12

## 2020-12-29 RX ADMIN — MORPHINE SULFATE 15 MG: 15 TABLET, FILM COATED, EXTENDED RELEASE ORAL at 09:12

## 2020-12-29 RX ADMIN — MUPIROCIN: 20 OINTMENT TOPICAL at 09:12

## 2020-12-29 RX ADMIN — MORPHINE SULFATE 15 MG: 15 TABLET, FILM COATED, EXTENDED RELEASE ORAL at 08:12

## 2020-12-29 RX ADMIN — MUPIROCIN: 20 OINTMENT TOPICAL at 08:12

## 2020-12-29 RX ADMIN — Medication: at 12:12

## 2020-12-29 RX ADMIN — POLYETHYLENE GLYCOL 3350 17 G: 17 POWDER, FOR SOLUTION ORAL at 09:12

## 2020-12-29 RX ADMIN — GABAPENTIN 300 MG: 300 CAPSULE ORAL at 02:12

## 2020-12-29 RX ADMIN — POTASSIUM & SODIUM PHOSPHATES POWDER PACK 280-160-250 MG 1 PACKET: 280-160-250 PACK at 10:12

## 2020-12-29 RX ADMIN — IBUPROFEN 800 MG: 400 TABLET, FILM COATED ORAL at 09:12

## 2020-12-29 RX ADMIN — GABAPENTIN 300 MG: 300 CAPSULE ORAL at 09:12

## 2020-12-29 RX ADMIN — Medication: at 07:12

## 2020-12-29 RX ADMIN — Medication: at 10:12

## 2020-12-29 RX ADMIN — TAMSULOSIN HYDROCHLORIDE 0.4 MG: 0.4 CAPSULE ORAL at 09:12

## 2020-12-29 RX ADMIN — ACETAMINOPHEN 1000 MG: 10 INJECTION, SOLUTION INTRAVENOUS at 01:12

## 2020-12-29 RX ADMIN — IBUPROFEN 800 MG: 800 INJECTION INTRAVENOUS at 01:12

## 2020-12-29 RX ADMIN — DIPHENHYDRAMINE HYDROCHLORIDE 25 MG: 50 INJECTION, SOLUTION INTRAMUSCULAR; INTRAVENOUS at 08:12

## 2020-12-29 RX ADMIN — ACETAMINOPHEN 1000 MG: 500 TABLET ORAL at 09:12

## 2020-12-29 RX ADMIN — GABAPENTIN 300 MG: 300 CAPSULE ORAL at 08:12

## 2020-12-29 RX ADMIN — Medication: at 02:12

## 2020-12-29 RX ADMIN — ENOXAPARIN SODIUM 40 MG: 40 INJECTION SUBCUTANEOUS at 04:12

## 2020-12-29 RX ADMIN — IBUPROFEN 800 MG: 800 INJECTION INTRAVENOUS at 06:12

## 2020-12-29 RX ADMIN — Medication: at 09:12

## 2020-12-29 RX ADMIN — METOPROLOL SUCCINATE 200 MG: 100 TABLET, EXTENDED RELEASE ORAL at 09:12

## 2020-12-29 RX ADMIN — IBUPROFEN 800 MG: 800 INJECTION INTRAVENOUS at 12:12

## 2020-12-29 RX ADMIN — DIPHENHYDRAMINE HYDROCHLORIDE 25 MG: 50 INJECTION, SOLUTION INTRAMUSCULAR; INTRAVENOUS at 01:12

## 2020-12-29 RX ADMIN — SODIUM CHLORIDE 40 ML/HR: 0.9 INJECTION, SOLUTION INTRAVENOUS at 09:12

## 2020-12-30 ENCOUNTER — PATIENT MESSAGE (OUTPATIENT)
Dept: ADMINISTRATIVE | Facility: OTHER | Age: 50
End: 2020-12-30

## 2020-12-30 PROBLEM — K59.03 DRUG-INDUCED CONSTIPATION: Status: ACTIVE | Noted: 2018-08-27

## 2020-12-30 LAB
BASOPHILS # BLD AUTO: 0.06 K/UL (ref 0–0.2)
BASOPHILS NFR BLD: 0.5 % (ref 0–1.9)
DIFFERENTIAL METHOD: ABNORMAL
EOSINOPHIL # BLD AUTO: 0.2 K/UL (ref 0–0.5)
EOSINOPHIL NFR BLD: 1.7 % (ref 0–8)
ERYTHROCYTE [DISTWIDTH] IN BLOOD BY AUTOMATED COUNT: 16.5 % (ref 11.5–14.5)
HCT VFR BLD AUTO: 50.9 % (ref 40–54)
HGB BLD-MCNC: 15.3 G/DL (ref 14–18)
IMM GRANULOCYTES # BLD AUTO: 0.04 K/UL (ref 0–0.04)
IMM GRANULOCYTES NFR BLD AUTO: 0.4 % (ref 0–0.5)
LYMPHOCYTES # BLD AUTO: 1.8 K/UL (ref 1–4.8)
LYMPHOCYTES NFR BLD: 15.6 % (ref 18–48)
MCH RBC QN AUTO: 26.9 PG (ref 27–31)
MCHC RBC AUTO-ENTMCNC: 30.1 G/DL (ref 32–36)
MCV RBC AUTO: 90 FL (ref 82–98)
MONOCYTES # BLD AUTO: 1.2 K/UL (ref 0.3–1)
MONOCYTES NFR BLD: 10.4 % (ref 4–15)
NEUTROPHILS # BLD AUTO: 8 K/UL (ref 1.8–7.7)
NEUTROPHILS NFR BLD: 71.4 % (ref 38–73)
NRBC BLD-RTO: 0 /100 WBC
PLATELET # BLD AUTO: 243 K/UL (ref 150–350)
PMV BLD AUTO: 10.7 FL (ref 9.2–12.9)
RBC # BLD AUTO: 5.68 M/UL (ref 4.6–6.2)
WBC # BLD AUTO: 11.21 K/UL (ref 3.9–12.7)

## 2020-12-30 PROCEDURE — 94761 N-INVAS EAR/PLS OXIMETRY MLT: CPT

## 2020-12-30 PROCEDURE — 25000003 PHARM REV CODE 250: Performed by: STUDENT IN AN ORGANIZED HEALTH CARE EDUCATION/TRAINING PROGRAM

## 2020-12-30 PROCEDURE — 93010 EKG 12-LEAD: ICD-10-PCS | Mod: ,,, | Performed by: INTERNAL MEDICINE

## 2020-12-30 PROCEDURE — 25000003 PHARM REV CODE 250: Performed by: SURGERY

## 2020-12-30 PROCEDURE — 36415 COLL VENOUS BLD VENIPUNCTURE: CPT

## 2020-12-30 PROCEDURE — 63600175 PHARM REV CODE 636 W HCPCS: Performed by: COLON & RECTAL SURGERY

## 2020-12-30 PROCEDURE — 25000003 PHARM REV CODE 250: Performed by: NURSE PRACTITIONER

## 2020-12-30 PROCEDURE — 20600001 HC STEP DOWN PRIVATE ROOM

## 2020-12-30 PROCEDURE — 99900035 HC TECH TIME PER 15 MIN (STAT)

## 2020-12-30 PROCEDURE — 85025 COMPLETE CBC W/AUTO DIFF WBC: CPT

## 2020-12-30 PROCEDURE — 93005 ELECTROCARDIOGRAM TRACING: CPT

## 2020-12-30 PROCEDURE — 94770 HC EXHALED C02 TEST: CPT

## 2020-12-30 PROCEDURE — 93010 ELECTROCARDIOGRAM REPORT: CPT | Mod: ,,, | Performed by: INTERNAL MEDICINE

## 2020-12-30 PROCEDURE — 97165 OT EVAL LOW COMPLEX 30 MIN: CPT

## 2020-12-30 PROCEDURE — 63600175 PHARM REV CODE 636 W HCPCS: Performed by: SURGERY

## 2020-12-30 PROCEDURE — 97530 THERAPEUTIC ACTIVITIES: CPT

## 2020-12-30 RX ORDER — OXYCODONE HYDROCHLORIDE 10 MG/1
10 TABLET ORAL EVERY 4 HOURS PRN
Status: DISCONTINUED | OUTPATIENT
Start: 2020-12-30 | End: 2020-12-31 | Stop reason: HOSPADM

## 2020-12-30 RX ORDER — BISACODYL 5 MG
15 TABLET, DELAYED RELEASE (ENTERIC COATED) ORAL NIGHTLY
Status: DISCONTINUED | OUTPATIENT
Start: 2020-12-30 | End: 2020-12-31 | Stop reason: HOSPADM

## 2020-12-30 RX ORDER — DOCUSATE SODIUM 100 MG/1
100 CAPSULE, LIQUID FILLED ORAL 2 TIMES DAILY
Status: DISCONTINUED | OUTPATIENT
Start: 2020-12-30 | End: 2020-12-31 | Stop reason: HOSPADM

## 2020-12-30 RX ORDER — CYCLOBENZAPRINE HCL 5 MG
5 TABLET ORAL 3 TIMES DAILY
Status: DISCONTINUED | OUTPATIENT
Start: 2020-12-30 | End: 2020-12-31 | Stop reason: HOSPADM

## 2020-12-30 RX ORDER — DOCUSATE SODIUM 100 MG/1
200 CAPSULE, LIQUID FILLED ORAL NIGHTLY
Status: DISCONTINUED | OUTPATIENT
Start: 2020-12-30 | End: 2020-12-31 | Stop reason: HOSPADM

## 2020-12-30 RX ORDER — GABAPENTIN 400 MG/1
400 CAPSULE ORAL 3 TIMES DAILY
Status: DISCONTINUED | OUTPATIENT
Start: 2020-12-30 | End: 2020-12-31 | Stop reason: HOSPADM

## 2020-12-30 RX ORDER — METOPROLOL TARTRATE 1 MG/ML
5 INJECTION, SOLUTION INTRAVENOUS EVERY 8 HOURS PRN
Status: DISCONTINUED | OUTPATIENT
Start: 2020-12-30 | End: 2020-12-31 | Stop reason: HOSPADM

## 2020-12-30 RX ORDER — ALPRAZOLAM 0.5 MG/1
0.5 TABLET ORAL DAILY
Status: DISCONTINUED | OUTPATIENT
Start: 2020-12-30 | End: 2020-12-31 | Stop reason: HOSPADM

## 2020-12-30 RX ADMIN — DOCUSATE SODIUM 100 MG: 100 CAPSULE, LIQUID FILLED ORAL at 12:12

## 2020-12-30 RX ADMIN — SODIUM CHLORIDE 40 ML/HR: 0.9 INJECTION, SOLUTION INTRAVENOUS at 10:12

## 2020-12-30 RX ADMIN — ACETAMINOPHEN 1000 MG: 500 TABLET ORAL at 05:12

## 2020-12-30 RX ADMIN — POLYETHYLENE GLYCOL 3350 17 G: 17 POWDER, FOR SOLUTION ORAL at 09:12

## 2020-12-30 RX ADMIN — OXYCODONE HYDROCHLORIDE 15 MG: 10 TABLET ORAL at 05:12

## 2020-12-30 RX ADMIN — Medication: at 11:12

## 2020-12-30 RX ADMIN — OXYCODONE HYDROCHLORIDE 15 MG: 10 TABLET ORAL at 12:12

## 2020-12-30 RX ADMIN — METOPROLOL SUCCINATE 200 MG: 100 TABLET, EXTENDED RELEASE ORAL at 09:12

## 2020-12-30 RX ADMIN — GABAPENTIN 300 MG: 300 CAPSULE ORAL at 09:12

## 2020-12-30 RX ADMIN — ENOXAPARIN SODIUM 40 MG: 40 INJECTION SUBCUTANEOUS at 04:12

## 2020-12-30 RX ADMIN — MUPIROCIN: 20 OINTMENT TOPICAL at 10:12

## 2020-12-30 RX ADMIN — Medication: at 05:12

## 2020-12-30 RX ADMIN — Medication: at 01:12

## 2020-12-30 RX ADMIN — ACETAMINOPHEN 1000 MG: 500 TABLET ORAL at 09:12

## 2020-12-30 RX ADMIN — ACETAMINOPHEN 1000 MG: 500 TABLET ORAL at 03:12

## 2020-12-30 RX ADMIN — MORPHINE SULFATE 15 MG: 15 TABLET, FILM COATED, EXTENDED RELEASE ORAL at 09:12

## 2020-12-30 RX ADMIN — IBUPROFEN 800 MG: 400 TABLET, FILM COATED ORAL at 05:12

## 2020-12-30 RX ADMIN — CYCLOBENZAPRINE HYDROCHLORIDE 5 MG: 5 TABLET, FILM COATED ORAL at 03:12

## 2020-12-30 RX ADMIN — IBUPROFEN 800 MG: 400 TABLET, FILM COATED ORAL at 09:12

## 2020-12-30 RX ADMIN — CYCLOBENZAPRINE HYDROCHLORIDE 5 MG: 5 TABLET, FILM COATED ORAL at 09:12

## 2020-12-30 RX ADMIN — TAMSULOSIN HYDROCHLORIDE 0.4 MG: 0.4 CAPSULE ORAL at 09:12

## 2020-12-30 RX ADMIN — DIPHENHYDRAMINE HYDROCHLORIDE 25 MG: 50 INJECTION, SOLUTION INTRAMUSCULAR; INTRAVENOUS at 04:12

## 2020-12-30 RX ADMIN — OXYCODONE HYDROCHLORIDE 15 MG: 10 TABLET ORAL at 10:12

## 2020-12-30 RX ADMIN — GABAPENTIN 400 MG: 400 CAPSULE ORAL at 03:12

## 2020-12-30 RX ADMIN — BISACODYL 15 MG: 5 TABLET, COATED ORAL at 09:12

## 2020-12-30 RX ADMIN — METOROPROLOL TARTRATE 5 MG: 5 INJECTION, SOLUTION INTRAVENOUS at 01:12

## 2020-12-30 RX ADMIN — GABAPENTIN 400 MG: 400 CAPSULE ORAL at 09:12

## 2020-12-30 RX ADMIN — IBUPROFEN 800 MG: 400 TABLET, FILM COATED ORAL at 03:12

## 2020-12-30 RX ADMIN — PANTOPRAZOLE SODIUM 40 MG: 40 TABLET, DELAYED RELEASE ORAL at 09:12

## 2020-12-30 RX ADMIN — DOCUSATE SODIUM 200 MG: 100 CAPSULE, LIQUID FILLED ORAL at 09:12

## 2020-12-30 RX ADMIN — MUPIROCIN: 20 OINTMENT TOPICAL at 09:12

## 2020-12-31 ENCOUNTER — PATIENT MESSAGE (OUTPATIENT)
Dept: ADMINISTRATIVE | Facility: OTHER | Age: 50
End: 2020-12-31

## 2020-12-31 ENCOUNTER — TELEPHONE (OUTPATIENT)
Dept: HEMATOLOGY/ONCOLOGY | Facility: CLINIC | Age: 50
End: 2020-12-31

## 2020-12-31 VITALS
HEIGHT: 75 IN | OXYGEN SATURATION: 98 % | WEIGHT: 315 LBS | TEMPERATURE: 96 F | BODY MASS INDEX: 39.17 KG/M2 | RESPIRATION RATE: 18 BRPM | SYSTOLIC BLOOD PRESSURE: 125 MMHG | HEART RATE: 107 BPM | DIASTOLIC BLOOD PRESSURE: 85 MMHG

## 2020-12-31 LAB
BASOPHILS # BLD AUTO: 0.04 K/UL (ref 0–0.2)
BASOPHILS NFR BLD: 0.4 % (ref 0–1.9)
CRP SERPL-MCNC: 113.7 MG/L (ref 0–8.2)
DIFFERENTIAL METHOD: ABNORMAL
EOSINOPHIL # BLD AUTO: 0.3 K/UL (ref 0–0.5)
EOSINOPHIL NFR BLD: 3.6 % (ref 0–8)
ERYTHROCYTE [DISTWIDTH] IN BLOOD BY AUTOMATED COUNT: 15.7 % (ref 11.5–14.5)
HCT VFR BLD AUTO: 45.9 % (ref 40–54)
HGB BLD-MCNC: 14.2 G/DL (ref 14–18)
IMM GRANULOCYTES # BLD AUTO: 0.05 K/UL (ref 0–0.04)
IMM GRANULOCYTES NFR BLD AUTO: 0.6 % (ref 0–0.5)
LYMPHOCYTES # BLD AUTO: 1.2 K/UL (ref 1–4.8)
LYMPHOCYTES NFR BLD: 12.8 % (ref 18–48)
MCH RBC QN AUTO: 28 PG (ref 27–31)
MCHC RBC AUTO-ENTMCNC: 30.9 G/DL (ref 32–36)
MCV RBC AUTO: 90 FL (ref 82–98)
MONOCYTES # BLD AUTO: 0.7 K/UL (ref 0.3–1)
MONOCYTES NFR BLD: 7.9 % (ref 4–15)
NEUTROPHILS # BLD AUTO: 6.7 K/UL (ref 1.8–7.7)
NEUTROPHILS NFR BLD: 74.7 % (ref 38–73)
NRBC BLD-RTO: 0 /100 WBC
PLATELET # BLD AUTO: 189 K/UL (ref 150–350)
PMV BLD AUTO: 9.7 FL (ref 9.2–12.9)
RBC # BLD AUTO: 5.08 M/UL (ref 4.6–6.2)
WBC # BLD AUTO: 8.99 K/UL (ref 3.9–12.7)

## 2020-12-31 PROCEDURE — 97116 GAIT TRAINING THERAPY: CPT | Mod: CQ

## 2020-12-31 PROCEDURE — 86140 C-REACTIVE PROTEIN: CPT

## 2020-12-31 PROCEDURE — 85025 COMPLETE CBC W/AUTO DIFF WBC: CPT

## 2020-12-31 PROCEDURE — 36415 COLL VENOUS BLD VENIPUNCTURE: CPT

## 2020-12-31 PROCEDURE — 25000003 PHARM REV CODE 250: Performed by: NURSE PRACTITIONER

## 2020-12-31 PROCEDURE — 97530 THERAPEUTIC ACTIVITIES: CPT | Mod: CQ

## 2020-12-31 PROCEDURE — 25000003 PHARM REV CODE 250: Performed by: SURGERY

## 2020-12-31 RX ORDER — MORPHINE SULFATE 30 MG/1
30 TABLET, FILM COATED, EXTENDED RELEASE ORAL 2 TIMES DAILY
Status: DISCONTINUED | OUTPATIENT
Start: 2020-12-31 | End: 2020-12-31

## 2020-12-31 RX ORDER — OXYCODONE HYDROCHLORIDE 15 MG/1
15 TABLET ORAL EVERY 4 HOURS PRN
Qty: 30 TABLET | Refills: 0 | Status: SHIPPED | OUTPATIENT
Start: 2020-12-31 | End: 2021-01-04 | Stop reason: SDUPTHER

## 2020-12-31 RX ORDER — MORPHINE SULFATE 15 MG/1
15 TABLET, FILM COATED, EXTENDED RELEASE ORAL 2 TIMES DAILY
Status: DISCONTINUED | OUTPATIENT
Start: 2020-12-31 | End: 2020-12-31 | Stop reason: HOSPADM

## 2020-12-31 RX ORDER — POLYETHYLENE GLYCOL 3350 17 G/17G
17 POWDER, FOR SOLUTION ORAL DAILY
Qty: 30 PACKET | Refills: 3 | Status: SHIPPED | OUTPATIENT
Start: 2021-01-01 | End: 2021-01-20

## 2020-12-31 RX ADMIN — MORPHINE SULFATE 15 MG: 15 TABLET, FILM COATED, EXTENDED RELEASE ORAL at 09:12

## 2020-12-31 RX ADMIN — ALPRAZOLAM 0.5 MG: 0.5 TABLET ORAL at 09:12

## 2020-12-31 RX ADMIN — OXYCODONE HYDROCHLORIDE 15 MG: 10 TABLET ORAL at 06:12

## 2020-12-31 RX ADMIN — GABAPENTIN 400 MG: 400 CAPSULE ORAL at 09:12

## 2020-12-31 RX ADMIN — OXYCODONE HYDROCHLORIDE 15 MG: 10 TABLET ORAL at 02:12

## 2020-12-31 RX ADMIN — MUPIROCIN: 20 OINTMENT TOPICAL at 09:12

## 2020-12-31 RX ADMIN — IBUPROFEN 800 MG: 400 TABLET, FILM COATED ORAL at 05:12

## 2020-12-31 RX ADMIN — PANTOPRAZOLE SODIUM 40 MG: 40 TABLET, DELAYED RELEASE ORAL at 09:12

## 2020-12-31 RX ADMIN — OXYCODONE HYDROCHLORIDE 15 MG: 10 TABLET ORAL at 11:12

## 2020-12-31 RX ADMIN — METOPROLOL SUCCINATE 200 MG: 100 TABLET, EXTENDED RELEASE ORAL at 09:12

## 2020-12-31 RX ADMIN — ACETAMINOPHEN 1000 MG: 500 TABLET ORAL at 05:12

## 2020-12-31 RX ADMIN — TAMSULOSIN HYDROCHLORIDE 0.4 MG: 0.4 CAPSULE ORAL at 09:12

## 2020-12-31 RX ADMIN — CYCLOBENZAPRINE HYDROCHLORIDE 5 MG: 5 TABLET, FILM COATED ORAL at 09:12

## 2020-12-31 NOTE — TELEPHONE ENCOUNTER
----- Message from Bella Flores RN sent at 12/31/2020 11:33 AM CST -----  Patient discharging today and needs follow up for drain removal next week.  Dr Mittal is 100% booked.  This patient will have to be fit in for that day per EDWIN Yoo.  Thank you.

## 2021-01-01 ENCOUNTER — PATIENT MESSAGE (OUTPATIENT)
Dept: ADMINISTRATIVE | Facility: OTHER | Age: 51
End: 2021-01-01

## 2021-01-01 ENCOUNTER — PATIENT MESSAGE (OUTPATIENT)
Dept: HEMATOLOGY/ONCOLOGY | Facility: CLINIC | Age: 51
End: 2021-01-01

## 2021-01-01 DIAGNOSIS — M62.838 MUSCLE SPASM: Primary | ICD-10-CM

## 2021-01-01 RX ORDER — CYCLOBENZAPRINE HCL 5 MG
5 TABLET ORAL 3 TIMES DAILY PRN
Qty: 60 TABLET | Refills: 5 | Status: SHIPPED | OUTPATIENT
Start: 2021-01-01 | End: 2021-04-27

## 2021-01-02 ENCOUNTER — PATIENT MESSAGE (OUTPATIENT)
Dept: ADMINISTRATIVE | Facility: OTHER | Age: 51
End: 2021-01-02

## 2021-01-04 ENCOUNTER — PATIENT MESSAGE (OUTPATIENT)
Dept: SURGERY | Facility: CLINIC | Age: 51
End: 2021-01-04

## 2021-01-04 ENCOUNTER — TELEPHONE (OUTPATIENT)
Dept: SURGERY | Facility: CLINIC | Age: 51
End: 2021-01-04

## 2021-01-04 DIAGNOSIS — C18.9 MALIGNANT NEOPLASM OF COLON METASTATIC TO PERITONEUM: Primary | ICD-10-CM

## 2021-01-04 DIAGNOSIS — C78.6 MALIGNANT NEOPLASM OF COLON METASTATIC TO PERITONEUM: Primary | ICD-10-CM

## 2021-01-04 RX ORDER — EMPAGLIFLOZIN 10 MG/1
10 TABLET, FILM COATED ORAL DAILY
COMMUNITY
Start: 2020-11-12 | End: 2022-01-03 | Stop reason: SDUPTHER

## 2021-01-04 RX ORDER — OXYCODONE HYDROCHLORIDE 15 MG/1
15 TABLET ORAL EVERY 4 HOURS PRN
Qty: 30 TABLET | Refills: 0 | Status: SHIPPED | OUTPATIENT
Start: 2021-01-04 | End: 2021-01-11 | Stop reason: SDUPTHER

## 2021-01-04 RX ORDER — PRAVASTATIN SODIUM 40 MG/1
40 TABLET ORAL DAILY
COMMUNITY
Start: 2020-11-19 | End: 2021-02-02

## 2021-01-04 RX ORDER — FLASH GLUCOSE SENSOR
1 KIT MISCELLANEOUS
COMMUNITY
Start: 2020-10-15 | End: 2023-11-21

## 2021-01-04 RX ORDER — EMPAGLIFLOZIN 10 MG/1
10 TABLET, FILM COATED ORAL EVERY MORNING
Status: ON HOLD | COMMUNITY
Start: 2020-12-17 | End: 2021-06-23

## 2021-01-05 ENCOUNTER — TELEPHONE (OUTPATIENT)
Dept: SURGERY | Facility: CLINIC | Age: 51
End: 2021-01-05

## 2021-01-05 ENCOUNTER — PATIENT MESSAGE (OUTPATIENT)
Dept: SURGERY | Facility: CLINIC | Age: 51
End: 2021-01-05

## 2021-01-06 ENCOUNTER — PATIENT MESSAGE (OUTPATIENT)
Dept: ADMINISTRATIVE | Facility: OTHER | Age: 51
End: 2021-01-06

## 2021-01-08 ENCOUNTER — OFFICE VISIT (OUTPATIENT)
Dept: SURGERY | Facility: CLINIC | Age: 51
End: 2021-01-08
Payer: COMMERCIAL

## 2021-01-08 VITALS
HEART RATE: 87 BPM | SYSTOLIC BLOOD PRESSURE: 180 MMHG | BODY MASS INDEX: 39.17 KG/M2 | DIASTOLIC BLOOD PRESSURE: 90 MMHG | WEIGHT: 315 LBS | HEIGHT: 75 IN

## 2021-01-08 DIAGNOSIS — C18.9 MALIGNANT NEOPLASM OF COLON METASTATIC TO PERITONEUM: Primary | ICD-10-CM

## 2021-01-08 DIAGNOSIS — C78.6 MALIGNANT NEOPLASM OF COLON METASTATIC TO PERITONEUM: Primary | ICD-10-CM

## 2021-01-08 LAB
FINAL PATHOLOGIC DIAGNOSIS: NORMAL
GROSS: NORMAL
Lab: NORMAL
SUPPLEMENTAL DIAGNOSIS: NORMAL

## 2021-01-08 PROCEDURE — 99024 POSTOP FOLLOW-UP VISIT: CPT | Mod: S$GLB,,, | Performed by: COLON & RECTAL SURGERY

## 2021-01-08 PROCEDURE — 1125F PR PAIN SEVERITY QUANTIFIED, PAIN PRESENT: ICD-10-PCS | Mod: S$GLB,,, | Performed by: COLON & RECTAL SURGERY

## 2021-01-08 PROCEDURE — 1125F AMNT PAIN NOTED PAIN PRSNT: CPT | Mod: S$GLB,,, | Performed by: COLON & RECTAL SURGERY

## 2021-01-08 PROCEDURE — 99999 PR PBB SHADOW E&M-EST. PATIENT-LVL IV: CPT | Mod: PBBFAC,,, | Performed by: COLON & RECTAL SURGERY

## 2021-01-08 PROCEDURE — 99999 PR PBB SHADOW E&M-EST. PATIENT-LVL IV: ICD-10-PCS | Mod: PBBFAC,,, | Performed by: COLON & RECTAL SURGERY

## 2021-01-08 PROCEDURE — 3008F BODY MASS INDEX DOCD: CPT | Mod: CPTII,S$GLB,, | Performed by: COLON & RECTAL SURGERY

## 2021-01-08 PROCEDURE — 99024 PR POST-OP FOLLOW-UP VISIT: ICD-10-PCS | Mod: S$GLB,,, | Performed by: COLON & RECTAL SURGERY

## 2021-01-08 PROCEDURE — 3008F PR BODY MASS INDEX (BMI) DOCUMENTED: ICD-10-PCS | Mod: CPTII,S$GLB,, | Performed by: COLON & RECTAL SURGERY

## 2021-01-11 DIAGNOSIS — C78.6 MALIGNANT NEOPLASM OF COLON METASTATIC TO PERITONEUM: ICD-10-CM

## 2021-01-11 DIAGNOSIS — C18.9 MALIGNANT NEOPLASM OF COLON METASTATIC TO PERITONEUM: ICD-10-CM

## 2021-01-11 RX ORDER — OXYCODONE HYDROCHLORIDE 15 MG/1
15 TABLET ORAL EVERY 4 HOURS PRN
Qty: 30 TABLET | Refills: 0 | Status: SHIPPED | OUTPATIENT
Start: 2021-01-11 | End: 2021-01-19 | Stop reason: SDUPTHER

## 2021-01-14 ENCOUNTER — PATIENT MESSAGE (OUTPATIENT)
Dept: HEMATOLOGY/ONCOLOGY | Facility: CLINIC | Age: 51
End: 2021-01-14

## 2021-01-14 ENCOUNTER — OFFICE VISIT (OUTPATIENT)
Dept: SURGERY | Facility: CLINIC | Age: 51
End: 2021-01-14
Payer: COMMERCIAL

## 2021-01-14 VITALS
HEIGHT: 75 IN | WEIGHT: 315 LBS | HEART RATE: 90 BPM | BODY MASS INDEX: 39.17 KG/M2 | SYSTOLIC BLOOD PRESSURE: 120 MMHG | DIASTOLIC BLOOD PRESSURE: 90 MMHG | TEMPERATURE: 98 F

## 2021-01-14 DIAGNOSIS — C18.7 CANCER OF SIGMOID COLON: Primary | ICD-10-CM

## 2021-01-14 PROCEDURE — 99024 PR POST-OP FOLLOW-UP VISIT: ICD-10-PCS | Mod: S$GLB,,, | Performed by: COLON & RECTAL SURGERY

## 2021-01-14 PROCEDURE — 99999 PR PBB SHADOW E&M-EST. PATIENT-LVL V: ICD-10-PCS | Mod: PBBFAC,,, | Performed by: COLON & RECTAL SURGERY

## 2021-01-14 PROCEDURE — 1125F PR PAIN SEVERITY QUANTIFIED, PAIN PRESENT: ICD-10-PCS | Mod: S$GLB,,, | Performed by: COLON & RECTAL SURGERY

## 2021-01-14 PROCEDURE — 3008F BODY MASS INDEX DOCD: CPT | Mod: CPTII,S$GLB,, | Performed by: COLON & RECTAL SURGERY

## 2021-01-14 PROCEDURE — 3008F PR BODY MASS INDEX (BMI) DOCUMENTED: ICD-10-PCS | Mod: CPTII,S$GLB,, | Performed by: COLON & RECTAL SURGERY

## 2021-01-14 PROCEDURE — 99999 PR PBB SHADOW E&M-EST. PATIENT-LVL V: CPT | Mod: PBBFAC,,, | Performed by: COLON & RECTAL SURGERY

## 2021-01-14 PROCEDURE — 99024 POSTOP FOLLOW-UP VISIT: CPT | Mod: S$GLB,,, | Performed by: COLON & RECTAL SURGERY

## 2021-01-14 PROCEDURE — 1125F AMNT PAIN NOTED PAIN PRSNT: CPT | Mod: S$GLB,,, | Performed by: COLON & RECTAL SURGERY

## 2021-01-18 ENCOUNTER — PATIENT MESSAGE (OUTPATIENT)
Dept: SURGERY | Facility: CLINIC | Age: 51
End: 2021-01-18

## 2021-01-18 DIAGNOSIS — C18.9 MALIGNANT NEOPLASM OF COLON METASTATIC TO PERITONEUM: ICD-10-CM

## 2021-01-18 DIAGNOSIS — C78.6 MALIGNANT NEOPLASM OF COLON METASTATIC TO PERITONEUM: ICD-10-CM

## 2021-01-19 ENCOUNTER — PATIENT MESSAGE (OUTPATIENT)
Dept: HEMATOLOGY/ONCOLOGY | Facility: CLINIC | Age: 51
End: 2021-01-19

## 2021-01-19 ENCOUNTER — PATIENT MESSAGE (OUTPATIENT)
Dept: SURGERY | Facility: CLINIC | Age: 51
End: 2021-01-19

## 2021-01-19 DIAGNOSIS — C78.6 MALIGNANT NEOPLASM OF COLON METASTATIC TO PERITONEUM: ICD-10-CM

## 2021-01-19 DIAGNOSIS — C18.9 MALIGNANT NEOPLASM OF COLON METASTATIC TO PERITONEUM: ICD-10-CM

## 2021-01-19 RX ORDER — OXYCODONE HYDROCHLORIDE 15 MG/1
15 TABLET ORAL EVERY 4 HOURS PRN
Qty: 30 TABLET | Refills: 0 | Status: CANCELLED | OUTPATIENT
Start: 2021-01-19

## 2021-01-19 RX ORDER — OXYCODONE HYDROCHLORIDE 15 MG/1
15 TABLET ORAL EVERY 4 HOURS PRN
Qty: 30 TABLET | Refills: 0 | Status: SHIPPED | OUTPATIENT
Start: 2021-01-19 | End: 2021-01-28 | Stop reason: SDUPTHER

## 2021-01-20 ENCOUNTER — HOSPITAL ENCOUNTER (INPATIENT)
Facility: HOSPITAL | Age: 51
LOS: 2 days | Discharge: HOME | DRG: 389 | End: 2021-01-22
Attending: EMERGENCY MEDICINE | Admitting: COLON & RECTAL SURGERY
Payer: COMMERCIAL

## 2021-01-20 DIAGNOSIS — K56.609 SBO (SMALL BOWEL OBSTRUCTION): Primary | ICD-10-CM

## 2021-01-20 LAB
ALBUMIN SERPL BCP-MCNC: 3.5 G/DL (ref 3.5–5.2)
ALP SERPL-CCNC: 101 U/L (ref 55–135)
ALT SERPL W/O P-5'-P-CCNC: 40 U/L (ref 10–44)
ANION GAP SERPL CALC-SCNC: 15 MMOL/L (ref 8–16)
AST SERPL-CCNC: 34 U/L (ref 10–40)
BASOPHILS # BLD AUTO: 0.03 K/UL (ref 0–0.2)
BASOPHILS NFR BLD: 0.3 % (ref 0–1.9)
BILIRUB SERPL-MCNC: 0.6 MG/DL (ref 0.1–1)
BUN SERPL-MCNC: 12 MG/DL (ref 6–20)
CALCIUM SERPL-MCNC: 9.4 MG/DL (ref 8.7–10.5)
CHLORIDE SERPL-SCNC: 99 MMOL/L (ref 95–110)
CO2 SERPL-SCNC: 23 MMOL/L (ref 23–29)
CREAT SERPL-MCNC: 0.9 MG/DL (ref 0.5–1.4)
CRP SERPL-MCNC: 37.8 MG/L (ref 0–8.2)
CTP QC/QA: YES
DIFFERENTIAL METHOD: ABNORMAL
EOSINOPHIL # BLD AUTO: 0.1 K/UL (ref 0–0.5)
EOSINOPHIL NFR BLD: 0.5 % (ref 0–8)
ERYTHROCYTE [DISTWIDTH] IN BLOOD BY AUTOMATED COUNT: 16 % (ref 11.5–14.5)
EST. GFR  (AFRICAN AMERICAN): >60 ML/MIN/1.73 M^2
EST. GFR  (NON AFRICAN AMERICAN): >60 ML/MIN/1.73 M^2
GLUCOSE SERPL-MCNC: 113 MG/DL (ref 70–110)
HCT VFR BLD AUTO: 52.6 % (ref 40–54)
HGB BLD-MCNC: 16 G/DL (ref 14–18)
IMM GRANULOCYTES # BLD AUTO: 0.05 K/UL (ref 0–0.04)
IMM GRANULOCYTES NFR BLD AUTO: 0.5 % (ref 0–0.5)
LACTATE SERPL-SCNC: 1 MMOL/L (ref 0.5–2.2)
LYMPHOCYTES # BLD AUTO: 1.1 K/UL (ref 1–4.8)
LYMPHOCYTES NFR BLD: 10.1 % (ref 18–48)
MCH RBC QN AUTO: 26.7 PG (ref 27–31)
MCHC RBC AUTO-ENTMCNC: 30.4 G/DL (ref 32–36)
MCV RBC AUTO: 88 FL (ref 82–98)
MONOCYTES # BLD AUTO: 0.9 K/UL (ref 0.3–1)
MONOCYTES NFR BLD: 7.9 % (ref 4–15)
NEUTROPHILS # BLD AUTO: 8.8 K/UL (ref 1.8–7.7)
NEUTROPHILS NFR BLD: 80.7 % (ref 38–73)
NRBC BLD-RTO: 0 /100 WBC
PLATELET # BLD AUTO: 417 K/UL (ref 150–350)
PMV BLD AUTO: 10.2 FL (ref 9.2–12.9)
POTASSIUM SERPL-SCNC: 4.8 MMOL/L (ref 3.5–5.1)
PROT SERPL-MCNC: 7.4 G/DL (ref 6–8.4)
RBC # BLD AUTO: 5.99 M/UL (ref 4.6–6.2)
SARS-COV-2 RDRP RESP QL NAA+PROBE: NEGATIVE
SODIUM SERPL-SCNC: 137 MMOL/L (ref 136–145)
WBC # BLD AUTO: 10.86 K/UL (ref 3.9–12.7)

## 2021-01-20 PROCEDURE — 63600175 PHARM REV CODE 636 W HCPCS: Performed by: SURGERY

## 2021-01-20 PROCEDURE — 63600175 PHARM REV CODE 636 W HCPCS: Performed by: STUDENT IN AN ORGANIZED HEALTH CARE EDUCATION/TRAINING PROGRAM

## 2021-01-20 PROCEDURE — 63600175 PHARM REV CODE 636 W HCPCS: Performed by: EMERGENCY MEDICINE

## 2021-01-20 PROCEDURE — 99285 EMERGENCY DEPT VISIT HI MDM: CPT | Mod: 25

## 2021-01-20 PROCEDURE — 96375 TX/PRO/DX INJ NEW DRUG ADDON: CPT

## 2021-01-20 PROCEDURE — 43752 NASAL/OROGASTRIC W/TUBE PLMT: CPT

## 2021-01-20 PROCEDURE — C9113 INJ PANTOPRAZOLE SODIUM, VIA: HCPCS | Performed by: SURGERY

## 2021-01-20 PROCEDURE — 20600001 HC STEP DOWN PRIVATE ROOM

## 2021-01-20 PROCEDURE — 85025 COMPLETE CBC W/AUTO DIFF WBC: CPT

## 2021-01-20 PROCEDURE — 86140 C-REACTIVE PROTEIN: CPT

## 2021-01-20 PROCEDURE — 25000003 PHARM REV CODE 250: Performed by: STUDENT IN AN ORGANIZED HEALTH CARE EDUCATION/TRAINING PROGRAM

## 2021-01-20 PROCEDURE — 99285 PR EMERGENCY DEPT VISIT,LEVEL V: ICD-10-PCS | Mod: ,,, | Performed by: EMERGENCY MEDICINE

## 2021-01-20 PROCEDURE — 25000003 PHARM REV CODE 250: Performed by: SURGERY

## 2021-01-20 PROCEDURE — U0002 COVID-19 LAB TEST NON-CDC: HCPCS | Performed by: SURGERY

## 2021-01-20 PROCEDURE — 96374 THER/PROPH/DIAG INJ IV PUSH: CPT

## 2021-01-20 PROCEDURE — 80053 COMPREHEN METABOLIC PANEL: CPT

## 2021-01-20 PROCEDURE — 99285 EMERGENCY DEPT VISIT HI MDM: CPT | Mod: ,,, | Performed by: EMERGENCY MEDICINE

## 2021-01-20 PROCEDURE — 83605 ASSAY OF LACTIC ACID: CPT

## 2021-01-20 RX ORDER — MORPHINE SULFATE 2 MG/ML
2 INJECTION, SOLUTION INTRAMUSCULAR; INTRAVENOUS EVERY 4 HOURS PRN
Status: DISCONTINUED | OUTPATIENT
Start: 2021-01-20 | End: 2021-01-21

## 2021-01-20 RX ORDER — HYDROMORPHONE HYDROCHLORIDE 1 MG/ML
1 INJECTION, SOLUTION INTRAMUSCULAR; INTRAVENOUS; SUBCUTANEOUS
Status: COMPLETED | OUTPATIENT
Start: 2021-01-20 | End: 2021-01-20

## 2021-01-20 RX ORDER — SODIUM CHLORIDE 0.9 % (FLUSH) 0.9 %
10 SYRINGE (ML) INJECTION
Status: DISCONTINUED | OUTPATIENT
Start: 2021-01-20 | End: 2021-01-22 | Stop reason: HOSPADM

## 2021-01-20 RX ORDER — LABETALOL HCL 20 MG/4 ML
10 SYRINGE (ML) INTRAVENOUS EVERY 6 HOURS PRN
Status: DISCONTINUED | OUTPATIENT
Start: 2021-01-20 | End: 2021-01-21

## 2021-01-20 RX ORDER — METOCLOPRAMIDE HYDROCHLORIDE 5 MG/ML
10 INJECTION INTRAMUSCULAR; INTRAVENOUS EVERY 6 HOURS PRN
Status: DISCONTINUED | OUTPATIENT
Start: 2021-01-20 | End: 2021-01-22 | Stop reason: HOSPADM

## 2021-01-20 RX ORDER — TALC
6 POWDER (GRAM) TOPICAL NIGHTLY PRN
Status: DISCONTINUED | OUTPATIENT
Start: 2021-01-20 | End: 2021-01-22 | Stop reason: HOSPADM

## 2021-01-20 RX ORDER — SODIUM CHLORIDE, SODIUM LACTATE, POTASSIUM CHLORIDE, CALCIUM CHLORIDE 600; 310; 30; 20 MG/100ML; MG/100ML; MG/100ML; MG/100ML
1000 INJECTION, SOLUTION INTRAVENOUS
Status: COMPLETED | OUTPATIENT
Start: 2021-01-20 | End: 2021-01-20

## 2021-01-20 RX ORDER — LIDOCAINE HYDROCHLORIDE 10 MG/ML
1 INJECTION, SOLUTION EPIDURAL; INFILTRATION; INTRACAUDAL; PERINEURAL ONCE
Status: DISCONTINUED | OUTPATIENT
Start: 2021-01-20 | End: 2021-01-22 | Stop reason: HOSPADM

## 2021-01-20 RX ORDER — ENOXAPARIN SODIUM 100 MG/ML
40 INJECTION SUBCUTANEOUS EVERY 12 HOURS
Status: DISCONTINUED | OUTPATIENT
Start: 2021-01-20 | End: 2021-01-22 | Stop reason: HOSPADM

## 2021-01-20 RX ORDER — SODIUM CHLORIDE, SODIUM LACTATE, POTASSIUM CHLORIDE, CALCIUM CHLORIDE 600; 310; 30; 20 MG/100ML; MG/100ML; MG/100ML; MG/100ML
INJECTION, SOLUTION INTRAVENOUS CONTINUOUS
Status: DISCONTINUED | OUTPATIENT
Start: 2021-01-20 | End: 2021-01-22

## 2021-01-20 RX ORDER — TAMSULOSIN HYDROCHLORIDE 0.4 MG/1
0.4 CAPSULE ORAL DAILY
Status: DISCONTINUED | OUTPATIENT
Start: 2021-01-20 | End: 2021-01-22 | Stop reason: HOSPADM

## 2021-01-20 RX ORDER — HYDROMORPHONE HYDROCHLORIDE 1 MG/ML
1 INJECTION, SOLUTION INTRAMUSCULAR; INTRAVENOUS; SUBCUTANEOUS EVERY 4 HOURS PRN
Status: DISCONTINUED | OUTPATIENT
Start: 2021-01-20 | End: 2021-01-20

## 2021-01-20 RX ORDER — PANTOPRAZOLE SODIUM 40 MG/10ML
40 INJECTION, POWDER, LYOPHILIZED, FOR SOLUTION INTRAVENOUS DAILY
Status: DISCONTINUED | OUTPATIENT
Start: 2021-01-20 | End: 2021-01-22 | Stop reason: HOSPADM

## 2021-01-20 RX ORDER — ONDANSETRON 2 MG/ML
4 INJECTION INTRAMUSCULAR; INTRAVENOUS EVERY 6 HOURS PRN
Status: DISCONTINUED | OUTPATIENT
Start: 2021-01-20 | End: 2021-01-22 | Stop reason: HOSPADM

## 2021-01-20 RX ORDER — MORPHINE SULFATE 2 MG/ML
1 INJECTION, SOLUTION INTRAMUSCULAR; INTRAVENOUS EVERY 4 HOURS PRN
Status: DISCONTINUED | OUTPATIENT
Start: 2021-01-20 | End: 2021-01-21

## 2021-01-20 RX ORDER — ACETAMINOPHEN 325 MG/1
650 TABLET ORAL EVERY 8 HOURS PRN
Status: DISCONTINUED | OUTPATIENT
Start: 2021-01-20 | End: 2021-01-21

## 2021-01-20 RX ORDER — METOPROLOL TARTRATE 1 MG/ML
5 INJECTION, SOLUTION INTRAVENOUS EVERY 6 HOURS
Status: DISCONTINUED | OUTPATIENT
Start: 2021-01-20 | End: 2021-01-21

## 2021-01-20 RX ORDER — HYDROMORPHONE HYDROCHLORIDE 1 MG/ML
0.5 INJECTION, SOLUTION INTRAMUSCULAR; INTRAVENOUS; SUBCUTANEOUS EVERY 4 HOURS PRN
Status: DISCONTINUED | OUTPATIENT
Start: 2021-01-20 | End: 2021-01-20

## 2021-01-20 RX ORDER — ONDANSETRON 2 MG/ML
4 INJECTION INTRAMUSCULAR; INTRAVENOUS
Status: COMPLETED | OUTPATIENT
Start: 2021-01-20 | End: 2021-01-20

## 2021-01-20 RX ADMIN — TAMSULOSIN HYDROCHLORIDE 0.4 MG: 0.4 CAPSULE ORAL at 01:01

## 2021-01-20 RX ADMIN — HYDROMORPHONE HYDROCHLORIDE 1 MG: 1 INJECTION, SOLUTION INTRAMUSCULAR; INTRAVENOUS; SUBCUTANEOUS at 04:01

## 2021-01-20 RX ADMIN — METOROPROLOL TARTRATE 5 MG: 5 INJECTION, SOLUTION INTRAVENOUS at 09:01

## 2021-01-20 RX ADMIN — Medication 10 MG: at 09:01

## 2021-01-20 RX ADMIN — ENOXAPARIN SODIUM 40 MG: 40 INJECTION SUBCUTANEOUS at 09:01

## 2021-01-20 RX ADMIN — METOROPROLOL TARTRATE 5 MG: 5 INJECTION, SOLUTION INTRAVENOUS at 06:01

## 2021-01-20 RX ADMIN — SODIUM CHLORIDE, SODIUM LACTATE, POTASSIUM CHLORIDE, AND CALCIUM CHLORIDE: .6; .31; .03; .02 INJECTION, SOLUTION INTRAVENOUS at 10:01

## 2021-01-20 RX ADMIN — METOROPROLOL TARTRATE 5 MG: 5 INJECTION, SOLUTION INTRAVENOUS at 11:01

## 2021-01-20 RX ADMIN — ONDANSETRON 4 MG: 2 INJECTION INTRAMUSCULAR; INTRAVENOUS at 12:01

## 2021-01-20 RX ADMIN — HYDROMORPHONE HYDROCHLORIDE 1 MG: 1 INJECTION, SOLUTION INTRAMUSCULAR; INTRAVENOUS; SUBCUTANEOUS at 08:01

## 2021-01-20 RX ADMIN — ONDANSETRON 4 MG: 2 INJECTION INTRAMUSCULAR; INTRAVENOUS at 08:01

## 2021-01-20 RX ADMIN — ENOXAPARIN SODIUM 40 MG: 40 INJECTION SUBCUTANEOUS at 08:01

## 2021-01-20 RX ADMIN — HYDROMORPHONE HYDROCHLORIDE 1 MG: 1 INJECTION, SOLUTION INTRAMUSCULAR; INTRAVENOUS; SUBCUTANEOUS at 12:01

## 2021-01-20 RX ADMIN — MORPHINE SULFATE 2 MG: 2 INJECTION, SOLUTION INTRAMUSCULAR; INTRAVENOUS at 08:01

## 2021-01-20 RX ADMIN — ONDANSETRON 4 MG: 2 INJECTION INTRAMUSCULAR; INTRAVENOUS at 06:01

## 2021-01-20 RX ADMIN — PANTOPRAZOLE SODIUM 40 MG: 40 INJECTION, POWDER, FOR SOLUTION INTRAVENOUS at 09:01

## 2021-01-20 RX ADMIN — SODIUM CHLORIDE, SODIUM LACTATE, POTASSIUM CHLORIDE, AND CALCIUM CHLORIDE 1000 ML: .6; .31; .03; .02 INJECTION, SOLUTION INTRAVENOUS at 08:01

## 2021-01-21 LAB
ANION GAP SERPL CALC-SCNC: 14 MMOL/L (ref 8–16)
BASOPHILS # BLD AUTO: 0.02 K/UL (ref 0–0.2)
BASOPHILS NFR BLD: 0.2 % (ref 0–1.9)
BUN SERPL-MCNC: 12 MG/DL (ref 6–20)
CALCIUM SERPL-MCNC: 9 MG/DL (ref 8.7–10.5)
CHLORIDE SERPL-SCNC: 101 MMOL/L (ref 95–110)
CO2 SERPL-SCNC: 23 MMOL/L (ref 23–29)
CREAT SERPL-MCNC: 0.8 MG/DL (ref 0.5–1.4)
CRP SERPL-MCNC: 55.3 MG/L (ref 0–8.2)
DIFFERENTIAL METHOD: ABNORMAL
EOSINOPHIL # BLD AUTO: 0 K/UL (ref 0–0.5)
EOSINOPHIL NFR BLD: 0.3 % (ref 0–8)
ERYTHROCYTE [DISTWIDTH] IN BLOOD BY AUTOMATED COUNT: 15.8 % (ref 11.5–14.5)
EST. GFR  (AFRICAN AMERICAN): >60 ML/MIN/1.73 M^2
EST. GFR  (NON AFRICAN AMERICAN): >60 ML/MIN/1.73 M^2
GLUCOSE SERPL-MCNC: 103 MG/DL (ref 70–110)
HCT VFR BLD AUTO: 48.6 % (ref 40–54)
HGB BLD-MCNC: 14.6 G/DL (ref 14–18)
IMM GRANULOCYTES # BLD AUTO: 0.03 K/UL (ref 0–0.04)
IMM GRANULOCYTES NFR BLD AUTO: 0.3 % (ref 0–0.5)
LYMPHOCYTES # BLD AUTO: 1.1 K/UL (ref 1–4.8)
LYMPHOCYTES NFR BLD: 11.4 % (ref 18–48)
MCH RBC QN AUTO: 26.5 PG (ref 27–31)
MCHC RBC AUTO-ENTMCNC: 30 G/DL (ref 32–36)
MCV RBC AUTO: 88 FL (ref 82–98)
MONOCYTES # BLD AUTO: 0.9 K/UL (ref 0.3–1)
MONOCYTES NFR BLD: 9.3 % (ref 4–15)
NEUTROPHILS # BLD AUTO: 7.5 K/UL (ref 1.8–7.7)
NEUTROPHILS NFR BLD: 78.5 % (ref 38–73)
NRBC BLD-RTO: 0 /100 WBC
PLATELET # BLD AUTO: 346 K/UL (ref 150–350)
PMV BLD AUTO: 10.1 FL (ref 9.2–12.9)
POTASSIUM SERPL-SCNC: 4.3 MMOL/L (ref 3.5–5.1)
RBC # BLD AUTO: 5.5 M/UL (ref 4.6–6.2)
SODIUM SERPL-SCNC: 138 MMOL/L (ref 136–145)
WBC # BLD AUTO: 9.6 K/UL (ref 3.9–12.7)

## 2021-01-21 PROCEDURE — 85025 COMPLETE CBC W/AUTO DIFF WBC: CPT

## 2021-01-21 PROCEDURE — C9113 INJ PANTOPRAZOLE SODIUM, VIA: HCPCS | Performed by: SURGERY

## 2021-01-21 PROCEDURE — 25000003 PHARM REV CODE 250: Performed by: STUDENT IN AN ORGANIZED HEALTH CARE EDUCATION/TRAINING PROGRAM

## 2021-01-21 PROCEDURE — 63600175 PHARM REV CODE 636 W HCPCS: Performed by: STUDENT IN AN ORGANIZED HEALTH CARE EDUCATION/TRAINING PROGRAM

## 2021-01-21 PROCEDURE — 86140 C-REACTIVE PROTEIN: CPT

## 2021-01-21 PROCEDURE — 25000003 PHARM REV CODE 250: Performed by: COLON & RECTAL SURGERY

## 2021-01-21 PROCEDURE — 80048 BASIC METABOLIC PNL TOTAL CA: CPT

## 2021-01-21 PROCEDURE — 36415 COLL VENOUS BLD VENIPUNCTURE: CPT

## 2021-01-21 PROCEDURE — 20600001 HC STEP DOWN PRIVATE ROOM

## 2021-01-21 PROCEDURE — 63600175 PHARM REV CODE 636 W HCPCS: Performed by: SURGERY

## 2021-01-21 PROCEDURE — 25000003 PHARM REV CODE 250: Performed by: SURGERY

## 2021-01-21 RX ORDER — IBUPROFEN 400 MG/1
400 TABLET ORAL EVERY 6 HOURS PRN
Status: DISCONTINUED | OUTPATIENT
Start: 2021-01-21 | End: 2021-01-22 | Stop reason: HOSPADM

## 2021-01-21 RX ORDER — ASPIRIN 81 MG/1
81 TABLET ORAL DAILY
Status: DISCONTINUED | OUTPATIENT
Start: 2021-01-21 | End: 2021-01-22 | Stop reason: HOSPADM

## 2021-01-21 RX ORDER — MORPHINE SULFATE 15 MG/1
15 TABLET, FILM COATED, EXTENDED RELEASE ORAL 2 TIMES DAILY
Status: DISCONTINUED | OUTPATIENT
Start: 2021-01-21 | End: 2021-01-22 | Stop reason: HOSPADM

## 2021-01-21 RX ORDER — ALPRAZOLAM 0.5 MG/1
0.5 TABLET ORAL DAILY
Status: DISCONTINUED | OUTPATIENT
Start: 2021-01-21 | End: 2021-01-21

## 2021-01-21 RX ORDER — ALPRAZOLAM 0.5 MG/1
0.5 TABLET ORAL NIGHTLY
Status: DISCONTINUED | OUTPATIENT
Start: 2021-01-21 | End: 2021-01-22 | Stop reason: HOSPADM

## 2021-01-21 RX ORDER — CYCLOBENZAPRINE HCL 5 MG
5 TABLET ORAL 3 TIMES DAILY PRN
Status: DISCONTINUED | OUTPATIENT
Start: 2021-01-21 | End: 2021-01-22 | Stop reason: HOSPADM

## 2021-01-21 RX ORDER — METOPROLOL SUCCINATE 100 MG/1
200 TABLET, EXTENDED RELEASE ORAL DAILY
Status: DISCONTINUED | OUTPATIENT
Start: 2021-01-21 | End: 2021-01-22 | Stop reason: HOSPADM

## 2021-01-21 RX ORDER — ACETAMINOPHEN 500 MG
1000 TABLET ORAL EVERY 6 HOURS
Status: DISCONTINUED | OUTPATIENT
Start: 2021-01-21 | End: 2021-01-22 | Stop reason: HOSPADM

## 2021-01-21 RX ADMIN — ACETAMINOPHEN 1000 MG: 500 TABLET ORAL at 06:01

## 2021-01-21 RX ADMIN — ALPRAZOLAM 0.5 MG: 0.5 TABLET ORAL at 11:01

## 2021-01-21 RX ADMIN — CYCLOBENZAPRINE HYDROCHLORIDE 5 MG: 5 TABLET, FILM COATED ORAL at 10:01

## 2021-01-21 RX ADMIN — ONDANSETRON 4 MG: 2 INJECTION INTRAMUSCULAR; INTRAVENOUS at 12:01

## 2021-01-21 RX ADMIN — MORPHINE SULFATE 15 MG: 15 TABLET, FILM COATED, EXTENDED RELEASE ORAL at 10:01

## 2021-01-21 RX ADMIN — ENOXAPARIN SODIUM 40 MG: 40 INJECTION SUBCUTANEOUS at 11:01

## 2021-01-21 RX ADMIN — PANTOPRAZOLE SODIUM 40 MG: 40 INJECTION, POWDER, FOR SOLUTION INTRAVENOUS at 09:01

## 2021-01-21 RX ADMIN — MORPHINE SULFATE 15 MG: 15 TABLET, FILM COATED, EXTENDED RELEASE ORAL at 11:01

## 2021-01-21 RX ADMIN — METOROPROLOL TARTRATE 5 MG: 5 INJECTION, SOLUTION INTRAVENOUS at 06:01

## 2021-01-21 RX ADMIN — ENOXAPARIN SODIUM 40 MG: 40 INJECTION SUBCUTANEOUS at 09:01

## 2021-01-21 RX ADMIN — OXYCODONE HYDROCHLORIDE 15 MG: 10 TABLET ORAL at 06:01

## 2021-01-21 RX ADMIN — SODIUM CHLORIDE, SODIUM LACTATE, POTASSIUM CHLORIDE, AND CALCIUM CHLORIDE: .6; .31; .03; .02 INJECTION, SOLUTION INTRAVENOUS at 11:01

## 2021-01-21 RX ADMIN — TAMSULOSIN HYDROCHLORIDE 0.4 MG: 0.4 CAPSULE ORAL at 09:01

## 2021-01-21 RX ADMIN — MORPHINE SULFATE 2 MG: 2 INJECTION, SOLUTION INTRAMUSCULAR; INTRAVENOUS at 04:01

## 2021-01-21 RX ADMIN — MORPHINE SULFATE 2 MG: 2 INJECTION, SOLUTION INTRAMUSCULAR; INTRAVENOUS at 12:01

## 2021-01-21 RX ADMIN — METOPROLOL SUCCINATE 200 MG: 100 TABLET, EXTENDED RELEASE ORAL at 10:01

## 2021-01-21 RX ADMIN — OXYCODONE HYDROCHLORIDE 15 MG: 10 TABLET ORAL at 09:01

## 2021-01-21 RX ADMIN — SODIUM CHLORIDE, SODIUM LACTATE, POTASSIUM CHLORIDE, AND CALCIUM CHLORIDE: .6; .31; .03; .02 INJECTION, SOLUTION INTRAVENOUS at 04:01

## 2021-01-21 RX ADMIN — OXYCODONE HYDROCHLORIDE 15 MG: 10 TABLET ORAL at 02:01

## 2021-01-21 RX ADMIN — CYCLOBENZAPRINE HYDROCHLORIDE 5 MG: 5 TABLET, FILM COATED ORAL at 06:01

## 2021-01-21 RX ADMIN — ONDANSETRON 4 MG: 2 INJECTION INTRAMUSCULAR; INTRAVENOUS at 06:01

## 2021-01-21 RX ADMIN — ACETAMINOPHEN 1000 MG: 500 TABLET ORAL at 11:01

## 2021-01-21 RX ADMIN — ASPIRIN 81 MG: 81 TABLET, COATED ORAL at 09:01

## 2021-01-22 VITALS
DIASTOLIC BLOOD PRESSURE: 82 MMHG | SYSTOLIC BLOOD PRESSURE: 138 MMHG | BODY MASS INDEX: 39.17 KG/M2 | RESPIRATION RATE: 7 BRPM | HEART RATE: 90 BPM | HEIGHT: 75 IN | TEMPERATURE: 98 F | OXYGEN SATURATION: 96 % | WEIGHT: 315 LBS

## 2021-01-22 LAB — CRP SERPL-MCNC: 52.1 MG/L (ref 0–8.2)

## 2021-01-22 PROCEDURE — C9113 INJ PANTOPRAZOLE SODIUM, VIA: HCPCS | Performed by: SURGERY

## 2021-01-22 PROCEDURE — 25000003 PHARM REV CODE 250: Performed by: STUDENT IN AN ORGANIZED HEALTH CARE EDUCATION/TRAINING PROGRAM

## 2021-01-22 PROCEDURE — 86140 C-REACTIVE PROTEIN: CPT

## 2021-01-22 PROCEDURE — 63600175 PHARM REV CODE 636 W HCPCS: Performed by: SURGERY

## 2021-01-22 PROCEDURE — 36415 COLL VENOUS BLD VENIPUNCTURE: CPT

## 2021-01-22 RX ORDER — POLYETHYLENE GLYCOL 3350 17 G/17G
17 POWDER, FOR SOLUTION ORAL 2 TIMES DAILY
Status: DISCONTINUED | OUTPATIENT
Start: 2021-01-22 | End: 2021-01-22 | Stop reason: HOSPADM

## 2021-01-22 RX ORDER — POLYETHYLENE GLYCOL 3350 17 G/17G
17 POWDER, FOR SOLUTION ORAL DAILY
Qty: 30 EACH | Refills: 0 | Status: SHIPPED | OUTPATIENT
Start: 2021-01-22 | End: 2021-02-21

## 2021-01-22 RX ORDER — BISACODYL 5 MG
5 TABLET, DELAYED RELEASE (ENTERIC COATED) ORAL 2 TIMES DAILY PRN
Status: DISCONTINUED | OUTPATIENT
Start: 2021-01-22 | End: 2021-01-22 | Stop reason: HOSPADM

## 2021-01-22 RX ORDER — PRAVASTATIN SODIUM 40 MG/1
40 TABLET ORAL DAILY
Status: DISCONTINUED | OUTPATIENT
Start: 2021-01-22 | End: 2021-01-22 | Stop reason: HOSPADM

## 2021-01-22 RX ADMIN — OXYCODONE HYDROCHLORIDE 15 MG: 10 TABLET ORAL at 05:01

## 2021-01-22 RX ADMIN — ACETAMINOPHEN 1000 MG: 500 TABLET ORAL at 05:01

## 2021-01-22 RX ADMIN — OXYCODONE HYDROCHLORIDE 15 MG: 10 TABLET ORAL at 01:01

## 2021-01-22 RX ADMIN — METOPROLOL SUCCINATE 200 MG: 100 TABLET, EXTENDED RELEASE ORAL at 08:01

## 2021-01-22 RX ADMIN — MORPHINE SULFATE 15 MG: 15 TABLET, FILM COATED, EXTENDED RELEASE ORAL at 10:01

## 2021-01-22 RX ADMIN — TAMSULOSIN HYDROCHLORIDE 0.4 MG: 0.4 CAPSULE ORAL at 08:01

## 2021-01-22 RX ADMIN — CYCLOBENZAPRINE HYDROCHLORIDE 5 MG: 5 TABLET, FILM COATED ORAL at 05:01

## 2021-01-22 RX ADMIN — PRAVASTATIN SODIUM 40 MG: 40 TABLET ORAL at 08:01

## 2021-01-22 RX ADMIN — PANTOPRAZOLE SODIUM 40 MG: 40 INJECTION, POWDER, FOR SOLUTION INTRAVENOUS at 08:01

## 2021-01-22 RX ADMIN — OXYCODONE HYDROCHLORIDE 15 MG: 10 TABLET ORAL at 03:01

## 2021-01-22 RX ADMIN — POLYETHYLENE GLYCOL 3350 17 G: 17 POWDER, FOR SOLUTION ORAL at 08:01

## 2021-01-22 RX ADMIN — ASPIRIN 81 MG: 81 TABLET, COATED ORAL at 08:01

## 2021-01-22 RX ADMIN — OXYCODONE HYDROCHLORIDE 15 MG: 10 TABLET ORAL at 08:01

## 2021-01-22 RX ADMIN — ENOXAPARIN SODIUM 40 MG: 40 INJECTION SUBCUTANEOUS at 10:01

## 2021-01-22 RX ADMIN — CYCLOBENZAPRINE HYDROCHLORIDE 5 MG: 5 TABLET, FILM COATED ORAL at 10:01

## 2021-01-22 RX ADMIN — DOCUSATE SODIUM 50 MG: 50 CAPSULE, LIQUID FILLED ORAL at 08:01

## 2021-01-25 ENCOUNTER — PATIENT OUTREACH (OUTPATIENT)
Dept: ADMINISTRATIVE | Facility: CLINIC | Age: 51
End: 2021-01-25

## 2021-01-27 ENCOUNTER — TELEPHONE (OUTPATIENT)
Dept: SURGERY | Facility: CLINIC | Age: 51
End: 2021-01-27

## 2021-01-27 ENCOUNTER — PATIENT MESSAGE (OUTPATIENT)
Dept: SURGERY | Facility: CLINIC | Age: 51
End: 2021-01-27

## 2021-01-28 ENCOUNTER — OFFICE VISIT (OUTPATIENT)
Dept: HEMATOLOGY/ONCOLOGY | Facility: CLINIC | Age: 51
End: 2021-01-28
Payer: COMMERCIAL

## 2021-01-28 DIAGNOSIS — G89.3 CANCER ASSOCIATED PAIN: ICD-10-CM

## 2021-01-28 DIAGNOSIS — F32.A DEPRESSION, UNSPECIFIED DEPRESSION TYPE: ICD-10-CM

## 2021-01-28 DIAGNOSIS — C78.6 MALIGNANT NEOPLASM OF COLON METASTATIC TO PERITONEUM: ICD-10-CM

## 2021-01-28 DIAGNOSIS — C18.9 MALIGNANT NEOPLASM OF COLON METASTATIC TO PERITONEUM: ICD-10-CM

## 2021-01-28 DIAGNOSIS — C18.9 ADENOCARCINOMA OF COLON: Primary | ICD-10-CM

## 2021-01-28 DIAGNOSIS — R10.9 ABDOMINAL PAIN, UNSPECIFIED ABDOMINAL LOCATION: ICD-10-CM

## 2021-01-28 DIAGNOSIS — Z92.89 HISTORY OF RECENT HOSPITALIZATION: ICD-10-CM

## 2021-01-28 DIAGNOSIS — M62.838 MUSCLE SPASM: ICD-10-CM

## 2021-01-28 DIAGNOSIS — R53.83 FATIGUE, UNSPECIFIED TYPE: ICD-10-CM

## 2021-01-28 DIAGNOSIS — E66.01 CLASS 3 SEVERE OBESITY WITHOUT SERIOUS COMORBIDITY WITH BODY MASS INDEX (BMI) OF 40.0 TO 44.9 IN ADULT, UNSPECIFIED OBESITY TYPE: ICD-10-CM

## 2021-01-28 PROCEDURE — 99215 OFFICE O/P EST HI 40 MIN: CPT | Mod: 95,,, | Performed by: INTERNAL MEDICINE

## 2021-01-28 PROCEDURE — 99215 PR OFFICE/OUTPT VISIT, EST, LEVL V, 40-54 MIN: ICD-10-PCS | Mod: 95,,, | Performed by: INTERNAL MEDICINE

## 2021-01-28 RX ORDER — OXYCODONE HYDROCHLORIDE 15 MG/1
15 TABLET ORAL EVERY 4 HOURS PRN
Qty: 30 TABLET | Refills: 0 | Status: SHIPPED | OUTPATIENT
Start: 2021-01-28 | End: 2021-02-03 | Stop reason: SDUPTHER

## 2021-01-28 RX ORDER — MORPHINE SULFATE 15 MG/1
15 TABLET, FILM COATED, EXTENDED RELEASE ORAL 2 TIMES DAILY
Qty: 60 TABLET | Refills: 0 | OUTPATIENT
Start: 2021-01-28

## 2021-01-29 ENCOUNTER — PATIENT MESSAGE (OUTPATIENT)
Dept: ADMINISTRATIVE | Facility: OTHER | Age: 51
End: 2021-01-29

## 2021-01-29 ENCOUNTER — PATIENT MESSAGE (OUTPATIENT)
Dept: HEMATOLOGY/ONCOLOGY | Facility: CLINIC | Age: 51
End: 2021-01-29

## 2021-02-02 ENCOUNTER — PATIENT MESSAGE (OUTPATIENT)
Dept: HEMATOLOGY/ONCOLOGY | Facility: CLINIC | Age: 51
End: 2021-02-02

## 2021-02-03 ENCOUNTER — PATIENT MESSAGE (OUTPATIENT)
Dept: SURGERY | Facility: CLINIC | Age: 51
End: 2021-02-03

## 2021-02-03 DIAGNOSIS — G89.3 CANCER ASSOCIATED PAIN: ICD-10-CM

## 2021-02-03 DIAGNOSIS — C18.9 MALIGNANT NEOPLASM OF COLON METASTATIC TO PERITONEUM: ICD-10-CM

## 2021-02-03 DIAGNOSIS — C78.6 MALIGNANT NEOPLASM OF COLON METASTATIC TO PERITONEUM: ICD-10-CM

## 2021-02-03 RX ORDER — MORPHINE SULFATE 15 MG/1
15 TABLET, FILM COATED, EXTENDED RELEASE ORAL 2 TIMES DAILY
Qty: 60 TABLET | Refills: 0 | Status: SHIPPED | OUTPATIENT
Start: 2021-02-03 | End: 2021-02-25 | Stop reason: SDUPTHER

## 2021-02-03 RX ORDER — OXYCODONE HYDROCHLORIDE 15 MG/1
15 TABLET ORAL EVERY 4 HOURS PRN
Qty: 30 TABLET | Refills: 0 | Status: ON HOLD | OUTPATIENT
Start: 2021-02-03 | End: 2021-06-23

## 2021-02-04 ENCOUNTER — PATIENT MESSAGE (OUTPATIENT)
Dept: HEMATOLOGY/ONCOLOGY | Facility: CLINIC | Age: 51
End: 2021-02-04

## 2021-02-08 ENCOUNTER — DOCUMENTATION ONLY (OUTPATIENT)
Dept: SURGERY | Facility: CLINIC | Age: 51
End: 2021-02-08

## 2021-02-09 ENCOUNTER — TELEPHONE (OUTPATIENT)
Dept: SURGERY | Facility: CLINIC | Age: 51
End: 2021-02-09

## 2021-02-09 ENCOUNTER — TELEPHONE (OUTPATIENT)
Dept: HEMATOLOGY/ONCOLOGY | Facility: CLINIC | Age: 51
End: 2021-02-09

## 2021-02-11 ENCOUNTER — OFFICE VISIT (OUTPATIENT)
Dept: SURGERY | Facility: CLINIC | Age: 51
End: 2021-02-11
Payer: COMMERCIAL

## 2021-02-11 VITALS
WEIGHT: 314.25 LBS | RESPIRATION RATE: 18 BRPM | SYSTOLIC BLOOD PRESSURE: 138 MMHG | TEMPERATURE: 98 F | HEIGHT: 75 IN | HEART RATE: 84 BPM | BODY MASS INDEX: 39.07 KG/M2 | DIASTOLIC BLOOD PRESSURE: 92 MMHG

## 2021-02-11 DIAGNOSIS — C78.6 MALIGNANT NEOPLASM OF COLON METASTATIC TO PERITONEUM: Primary | ICD-10-CM

## 2021-02-11 DIAGNOSIS — C18.9 MALIGNANT NEOPLASM OF COLON METASTATIC TO PERITONEUM: Primary | ICD-10-CM

## 2021-02-11 PROCEDURE — 99999 PR PBB SHADOW E&M-EST. PATIENT-LVL III: ICD-10-PCS | Mod: PBBFAC,,, | Performed by: COLON & RECTAL SURGERY

## 2021-02-11 PROCEDURE — 99024 PR POST-OP FOLLOW-UP VISIT: ICD-10-PCS | Mod: S$GLB,,, | Performed by: COLON & RECTAL SURGERY

## 2021-02-11 PROCEDURE — 1125F PR PAIN SEVERITY QUANTIFIED, PAIN PRESENT: ICD-10-PCS | Mod: S$GLB,,, | Performed by: COLON & RECTAL SURGERY

## 2021-02-11 PROCEDURE — 3008F BODY MASS INDEX DOCD: CPT | Mod: CPTII,S$GLB,, | Performed by: COLON & RECTAL SURGERY

## 2021-02-11 PROCEDURE — 99024 POSTOP FOLLOW-UP VISIT: CPT | Mod: S$GLB,,, | Performed by: COLON & RECTAL SURGERY

## 2021-02-11 PROCEDURE — 99999 PR PBB SHADOW E&M-EST. PATIENT-LVL III: CPT | Mod: PBBFAC,,, | Performed by: COLON & RECTAL SURGERY

## 2021-02-11 PROCEDURE — 1125F AMNT PAIN NOTED PAIN PRSNT: CPT | Mod: S$GLB,,, | Performed by: COLON & RECTAL SURGERY

## 2021-02-11 PROCEDURE — 3008F PR BODY MASS INDEX (BMI) DOCUMENTED: ICD-10-PCS | Mod: CPTII,S$GLB,, | Performed by: COLON & RECTAL SURGERY

## 2021-02-25 ENCOUNTER — PATIENT MESSAGE (OUTPATIENT)
Dept: HEMATOLOGY/ONCOLOGY | Facility: CLINIC | Age: 51
End: 2021-02-25

## 2021-02-28 ENCOUNTER — PATIENT MESSAGE (OUTPATIENT)
Dept: ADMINISTRATIVE | Facility: OTHER | Age: 51
End: 2021-02-28

## 2021-03-02 ENCOUNTER — OFFICE VISIT (OUTPATIENT)
Dept: HEMATOLOGY/ONCOLOGY | Facility: CLINIC | Age: 51
End: 2021-03-02
Payer: COMMERCIAL

## 2021-03-02 ENCOUNTER — PATIENT MESSAGE (OUTPATIENT)
Dept: HEMATOLOGY/ONCOLOGY | Facility: CLINIC | Age: 51
End: 2021-03-02

## 2021-03-02 DIAGNOSIS — F32.A DEPRESSION, UNSPECIFIED DEPRESSION TYPE: ICD-10-CM

## 2021-03-02 DIAGNOSIS — C18.9 ADENOCARCINOMA OF COLON: Primary | ICD-10-CM

## 2021-03-02 DIAGNOSIS — M62.838 MUSCLE SPASM: ICD-10-CM

## 2021-03-02 DIAGNOSIS — E66.01 CLASS 3 SEVERE OBESITY WITHOUT SERIOUS COMORBIDITY WITH BODY MASS INDEX (BMI) OF 40.0 TO 44.9 IN ADULT, UNSPECIFIED OBESITY TYPE: ICD-10-CM

## 2021-03-02 DIAGNOSIS — R53.83 FATIGUE, UNSPECIFIED TYPE: ICD-10-CM

## 2021-03-02 DIAGNOSIS — G89.3 CANCER ASSOCIATED PAIN: ICD-10-CM

## 2021-03-02 PROCEDURE — 99214 PR OFFICE/OUTPT VISIT, EST, LEVL IV, 30-39 MIN: ICD-10-PCS | Mod: 95,,, | Performed by: INTERNAL MEDICINE

## 2021-03-02 PROCEDURE — 99214 OFFICE O/P EST MOD 30 MIN: CPT | Mod: 95,,, | Performed by: INTERNAL MEDICINE

## 2021-03-23 ENCOUNTER — TELEPHONE (OUTPATIENT)
Dept: HEMATOLOGY/ONCOLOGY | Facility: CLINIC | Age: 51
End: 2021-03-23

## 2021-03-30 ENCOUNTER — OFFICE VISIT (OUTPATIENT)
Dept: HEMATOLOGY/ONCOLOGY | Facility: CLINIC | Age: 51
End: 2021-03-30
Payer: COMMERCIAL

## 2021-03-30 ENCOUNTER — PATIENT MESSAGE (OUTPATIENT)
Dept: ADMINISTRATIVE | Facility: OTHER | Age: 51
End: 2021-03-30

## 2021-03-30 DIAGNOSIS — E29.1 HYPOGONADISM IN MALE: ICD-10-CM

## 2021-03-30 DIAGNOSIS — C18.9 ADENOCARCINOMA OF COLON: Primary | ICD-10-CM

## 2021-03-30 DIAGNOSIS — C18.9 MALIGNANT NEOPLASM OF COLON METASTATIC TO PERITONEUM: ICD-10-CM

## 2021-03-30 DIAGNOSIS — G89.3 CANCER ASSOCIATED PAIN: ICD-10-CM

## 2021-03-30 DIAGNOSIS — R73.9 BLOOD GLUCOSE ELEVATED: ICD-10-CM

## 2021-03-30 DIAGNOSIS — E66.01 CLASS 3 SEVERE OBESITY WITHOUT SERIOUS COMORBIDITY WITH BODY MASS INDEX (BMI) OF 40.0 TO 44.9 IN ADULT, UNSPECIFIED OBESITY TYPE: ICD-10-CM

## 2021-03-30 DIAGNOSIS — R53.83 FATIGUE, UNSPECIFIED TYPE: ICD-10-CM

## 2021-03-30 DIAGNOSIS — C78.6 MALIGNANT NEOPLASM OF COLON METASTATIC TO PERITONEUM: ICD-10-CM

## 2021-03-30 DIAGNOSIS — F32.A DEPRESSION, UNSPECIFIED DEPRESSION TYPE: ICD-10-CM

## 2021-03-30 DIAGNOSIS — M62.838 MUSCLE SPASM: ICD-10-CM

## 2021-03-30 PROCEDURE — 99215 OFFICE O/P EST HI 40 MIN: CPT | Mod: 95,,, | Performed by: INTERNAL MEDICINE

## 2021-03-30 PROCEDURE — 99215 PR OFFICE/OUTPT VISIT, EST, LEVL V, 40-54 MIN: ICD-10-PCS | Mod: 95,,, | Performed by: INTERNAL MEDICINE

## 2021-03-30 RX ORDER — MORPHINE SULFATE 15 MG/1
15 TABLET, FILM COATED, EXTENDED RELEASE ORAL 2 TIMES DAILY
Qty: 60 TABLET | Refills: 0 | Status: SHIPPED | OUTPATIENT
Start: 2021-03-30 | End: 2021-04-28 | Stop reason: SDUPTHER

## 2021-03-31 ENCOUNTER — DOCUMENTATION ONLY (OUTPATIENT)
Dept: HEMATOLOGY/ONCOLOGY | Facility: CLINIC | Age: 51
End: 2021-03-31

## 2021-04-07 DIAGNOSIS — C18.9 ADENOCARCINOMA OF COLON: Primary | ICD-10-CM

## 2021-04-08 ENCOUNTER — PATIENT MESSAGE (OUTPATIENT)
Dept: HEMATOLOGY/ONCOLOGY | Facility: CLINIC | Age: 51
End: 2021-04-08

## 2021-04-08 ENCOUNTER — TELEPHONE (OUTPATIENT)
Dept: HEMATOLOGY/ONCOLOGY | Facility: CLINIC | Age: 51
End: 2021-04-08

## 2021-04-26 ENCOUNTER — PATIENT MESSAGE (OUTPATIENT)
Dept: HEMATOLOGY/ONCOLOGY | Facility: CLINIC | Age: 51
End: 2021-04-26

## 2021-04-26 DIAGNOSIS — M62.838 MUSCLE SPASM: ICD-10-CM

## 2021-04-27 RX ORDER — CYCLOBENZAPRINE HCL 5 MG
TABLET ORAL
Qty: 60 TABLET | Refills: 5 | Status: ON HOLD | OUTPATIENT
Start: 2021-04-27 | End: 2021-06-27 | Stop reason: HOSPADM

## 2021-04-28 DIAGNOSIS — G89.3 CANCER ASSOCIATED PAIN: Primary | ICD-10-CM

## 2021-04-28 RX ORDER — MORPHINE SULFATE 15 MG/1
15 TABLET, FILM COATED, EXTENDED RELEASE ORAL 2 TIMES DAILY
Qty: 60 TABLET | Refills: 0 | Status: CANCELLED | OUTPATIENT
Start: 2021-04-28

## 2021-05-06 ENCOUNTER — PATIENT MESSAGE (OUTPATIENT)
Dept: HEMATOLOGY/ONCOLOGY | Facility: CLINIC | Age: 51
End: 2021-05-06

## 2021-05-11 ENCOUNTER — OFFICE VISIT (OUTPATIENT)
Dept: HEMATOLOGY/ONCOLOGY | Facility: CLINIC | Age: 51
End: 2021-05-11
Payer: COMMERCIAL

## 2021-05-11 VITALS
HEART RATE: 78 BPM | TEMPERATURE: 97 F | BODY MASS INDEX: 39.17 KG/M2 | DIASTOLIC BLOOD PRESSURE: 85 MMHG | RESPIRATION RATE: 18 BRPM | HEIGHT: 75 IN | WEIGHT: 315 LBS | SYSTOLIC BLOOD PRESSURE: 130 MMHG | OXYGEN SATURATION: 98 %

## 2021-05-11 DIAGNOSIS — C78.6 MALIGNANT NEOPLASM OF COLON METASTATIC TO PERITONEUM: ICD-10-CM

## 2021-05-11 DIAGNOSIS — C18.9 ADENOCARCINOMA OF COLON: Primary | ICD-10-CM

## 2021-05-11 DIAGNOSIS — E66.01 CLASS 3 SEVERE OBESITY WITHOUT SERIOUS COMORBIDITY WITH BODY MASS INDEX (BMI) OF 40.0 TO 44.9 IN ADULT, UNSPECIFIED OBESITY TYPE: ICD-10-CM

## 2021-05-11 DIAGNOSIS — R18.8 ABDOMINAL FLUID COLLECTION: ICD-10-CM

## 2021-05-11 DIAGNOSIS — R53.83 FATIGUE, UNSPECIFIED TYPE: ICD-10-CM

## 2021-05-11 DIAGNOSIS — C18.9 MALIGNANT NEOPLASM OF COLON METASTATIC TO PERITONEUM: ICD-10-CM

## 2021-05-11 DIAGNOSIS — G89.3 CANCER ASSOCIATED PAIN: ICD-10-CM

## 2021-05-11 DIAGNOSIS — M62.838 MUSCLE SPASM: ICD-10-CM

## 2021-05-11 PROCEDURE — 99999 PR PBB SHADOW E&M-EST. PATIENT-LVL III: ICD-10-PCS | Mod: PBBFAC,,, | Performed by: INTERNAL MEDICINE

## 2021-05-11 PROCEDURE — 1126F PR PAIN SEVERITY QUANTIFIED, NO PAIN PRESENT: ICD-10-PCS | Mod: S$GLB,,, | Performed by: INTERNAL MEDICINE

## 2021-05-11 PROCEDURE — 99215 OFFICE O/P EST HI 40 MIN: CPT | Mod: S$GLB,,, | Performed by: INTERNAL MEDICINE

## 2021-05-11 PROCEDURE — 99999 PR PBB SHADOW E&M-EST. PATIENT-LVL III: CPT | Mod: PBBFAC,,, | Performed by: INTERNAL MEDICINE

## 2021-05-11 PROCEDURE — 1126F AMNT PAIN NOTED NONE PRSNT: CPT | Mod: S$GLB,,, | Performed by: INTERNAL MEDICINE

## 2021-05-11 PROCEDURE — 3008F PR BODY MASS INDEX (BMI) DOCUMENTED: ICD-10-PCS | Mod: CPTII,S$GLB,, | Performed by: INTERNAL MEDICINE

## 2021-05-11 PROCEDURE — 3008F BODY MASS INDEX DOCD: CPT | Mod: CPTII,S$GLB,, | Performed by: INTERNAL MEDICINE

## 2021-05-11 PROCEDURE — 99215 PR OFFICE/OUTPT VISIT, EST, LEVL V, 40-54 MIN: ICD-10-PCS | Mod: S$GLB,,, | Performed by: INTERNAL MEDICINE

## 2021-05-11 RX ORDER — ATORVASTATIN CALCIUM 20 MG/1
20 TABLET, FILM COATED ORAL DAILY
Status: ON HOLD | COMMUNITY
Start: 2021-04-28 | End: 2021-06-23

## 2021-05-11 RX ORDER — ONDANSETRON 8 MG/1
TABLET, ORALLY DISINTEGRATING ORAL
COMMUNITY
Start: 2021-03-19 | End: 2021-05-31 | Stop reason: SDUPTHER

## 2021-05-11 RX ORDER — PRAVASTATIN SODIUM 40 MG/1
40 TABLET ORAL DAILY
Status: ON HOLD | COMMUNITY
Start: 2021-02-26 | End: 2021-06-23

## 2021-05-17 ENCOUNTER — TELEPHONE (OUTPATIENT)
Dept: SURGERY | Facility: CLINIC | Age: 51
End: 2021-05-17

## 2021-05-17 DIAGNOSIS — S30.1XXD ABDOMINAL WALL SEROMA, SUBSEQUENT ENCOUNTER: ICD-10-CM

## 2021-05-17 DIAGNOSIS — C18.9 MALIGNANT NEOPLASM OF COLON METASTATIC TO PERITONEUM: Primary | ICD-10-CM

## 2021-05-17 DIAGNOSIS — C78.6 MALIGNANT NEOPLASM OF COLON METASTATIC TO PERITONEUM: Primary | ICD-10-CM

## 2021-05-18 ENCOUNTER — PATIENT MESSAGE (OUTPATIENT)
Dept: HEMATOLOGY/ONCOLOGY | Facility: CLINIC | Age: 51
End: 2021-05-18

## 2021-05-23 DIAGNOSIS — F41.9 ANXIETY: ICD-10-CM

## 2021-05-24 ENCOUNTER — HOSPITAL ENCOUNTER (OUTPATIENT)
Dept: INTERVENTIONAL RADIOLOGY/VASCULAR | Facility: HOSPITAL | Age: 51
Discharge: HOME | End: 2021-05-24
Attending: INTERNAL MEDICINE
Payer: COMMERCIAL

## 2021-05-24 ENCOUNTER — PATIENT MESSAGE (OUTPATIENT)
Dept: HEMATOLOGY/ONCOLOGY | Facility: CLINIC | Age: 51
End: 2021-05-24

## 2021-05-24 DIAGNOSIS — R18.8 ABDOMINAL FLUID COLLECTION: ICD-10-CM

## 2021-05-24 LAB
GRAM STN SPEC: NORMAL
GRAM STN SPEC: NORMAL

## 2021-05-24 PROCEDURE — 49406 IMAGE CATH FLUID PERI/RETRO: CPT | Mod: ,,, | Performed by: RADIOLOGY

## 2021-05-24 PROCEDURE — 87075 CULTR BACTERIA EXCEPT BLOOD: CPT | Performed by: INTERNAL MEDICINE

## 2021-05-24 PROCEDURE — 87205 SMEAR GRAM STAIN: CPT | Performed by: INTERNAL MEDICINE

## 2021-05-24 PROCEDURE — A4215 STERILE NEEDLE: HCPCS

## 2021-05-24 PROCEDURE — 49406 IR ABSCESS DRAINAGE WITH TUBE PLACEMENT: ICD-10-PCS | Mod: ,,, | Performed by: RADIOLOGY

## 2021-05-24 PROCEDURE — 87070 CULTURE OTHR SPECIMN AEROBIC: CPT | Performed by: INTERNAL MEDICINE

## 2021-05-24 PROCEDURE — 49406 IMAGE CATH FLUID PERI/RETRO: CPT | Performed by: RADIOLOGY

## 2021-05-24 RX ORDER — ALPRAZOLAM 0.5 MG/1
0.5 TABLET ORAL DAILY
Qty: 60 TABLET | Refills: 0 | Status: ON HOLD | OUTPATIENT
Start: 2021-05-24 | End: 2021-06-24 | Stop reason: SDUPTHER

## 2021-05-25 ENCOUNTER — PATIENT MESSAGE (OUTPATIENT)
Dept: HEMATOLOGY/ONCOLOGY | Facility: CLINIC | Age: 51
End: 2021-05-25

## 2021-05-25 DIAGNOSIS — F41.9 ANXIETY: ICD-10-CM

## 2021-05-25 DIAGNOSIS — C18.9 ADENOCARCINOMA OF COLON: Primary | ICD-10-CM

## 2021-05-25 RX ORDER — LIDOCAINE AND PRILOCAINE 25; 25 MG/G; MG/G
CREAM TOPICAL DAILY
Qty: 4 G | Refills: 3 | Status: SHIPPED | OUTPATIENT
Start: 2021-05-25 | End: 2022-09-15 | Stop reason: ALTCHOICE

## 2021-05-25 RX ORDER — LIDOCAINE AND PRILOCAINE 25; 25 MG/G; MG/G
CREAM TOPICAL
Qty: 4 G | Refills: 3 | Status: SHIPPED | OUTPATIENT
Start: 2021-05-25 | End: 2021-05-25 | Stop reason: SDUPTHER

## 2021-05-25 RX ORDER — ALPRAZOLAM 0.5 MG/1
0.5 TABLET ORAL DAILY
Qty: 60 TABLET | Refills: 0 | Status: CANCELLED | OUTPATIENT
Start: 2021-05-25

## 2021-05-26 ENCOUNTER — PATIENT MESSAGE (OUTPATIENT)
Dept: HEMATOLOGY/ONCOLOGY | Facility: CLINIC | Age: 51
End: 2021-05-26

## 2021-05-27 ENCOUNTER — PATIENT MESSAGE (OUTPATIENT)
Dept: HEMATOLOGY/ONCOLOGY | Facility: CLINIC | Age: 51
End: 2021-05-27

## 2021-05-27 DIAGNOSIS — K62.89 RECTAL PAIN: ICD-10-CM

## 2021-05-28 LAB
BACTERIA SPEC AEROBE CULT: NO GROWTH
BACTERIA SPEC ANAEROBE CULT: NORMAL

## 2021-05-28 RX ORDER — GABAPENTIN 300 MG/1
300 CAPSULE ORAL 3 TIMES DAILY
Qty: 90 CAPSULE | Refills: 11 | Status: SHIPPED | OUTPATIENT
Start: 2021-05-28 | End: 2023-11-21

## 2021-05-31 ENCOUNTER — PATIENT MESSAGE (OUTPATIENT)
Dept: HEMATOLOGY/ONCOLOGY | Facility: CLINIC | Age: 51
End: 2021-05-31

## 2021-05-31 ENCOUNTER — TELEPHONE (OUTPATIENT)
Dept: HEMATOLOGY/ONCOLOGY | Facility: CLINIC | Age: 51
End: 2021-05-31

## 2021-05-31 ENCOUNTER — INFUSION (OUTPATIENT)
Dept: INFUSION THERAPY | Facility: HOSPITAL | Age: 51
End: 2021-05-31
Payer: COMMERCIAL

## 2021-05-31 VITALS
DIASTOLIC BLOOD PRESSURE: 83 MMHG | HEIGHT: 75 IN | WEIGHT: 315 LBS | SYSTOLIC BLOOD PRESSURE: 139 MMHG | HEART RATE: 111 BPM | BODY MASS INDEX: 39.17 KG/M2 | RESPIRATION RATE: 18 BRPM

## 2021-05-31 DIAGNOSIS — C18.9 ADENOCARCINOMA OF COLON: Primary | ICD-10-CM

## 2021-05-31 DIAGNOSIS — R11.2 CHEMOTHERAPY INDUCED NAUSEA AND VOMITING: ICD-10-CM

## 2021-05-31 DIAGNOSIS — T45.1X5A CHEMOTHERAPY INDUCED NAUSEA AND VOMITING: ICD-10-CM

## 2021-05-31 DIAGNOSIS — C18.9 MALIGNANT NEOPLASM OF COLON METASTATIC TO PERITONEUM: ICD-10-CM

## 2021-05-31 DIAGNOSIS — C78.6 MALIGNANT NEOPLASM OF COLON METASTATIC TO PERITONEUM: ICD-10-CM

## 2021-05-31 LAB
ALBUMIN SERPL BCP-MCNC: 3.1 G/DL (ref 3.5–5.2)
ALP SERPL-CCNC: 91 U/L (ref 55–135)
ALT SERPL W/O P-5'-P-CCNC: 17 U/L (ref 10–44)
ANION GAP SERPL CALC-SCNC: 12 MMOL/L (ref 8–16)
AST SERPL-CCNC: 17 U/L (ref 10–40)
BASOPHILS # BLD AUTO: 0.03 K/UL (ref 0–0.2)
BASOPHILS NFR BLD: 0.2 % (ref 0–1.9)
BILIRUB SERPL-MCNC: 0.6 MG/DL (ref 0.1–1)
BUN SERPL-MCNC: 13 MG/DL (ref 6–20)
CALCIUM SERPL-MCNC: 9.8 MG/DL (ref 8.7–10.5)
CHLORIDE SERPL-SCNC: 102 MMOL/L (ref 95–110)
CO2 SERPL-SCNC: 23 MMOL/L (ref 23–29)
CREAT SERPL-MCNC: 1 MG/DL (ref 0.5–1.4)
DIFFERENTIAL METHOD: ABNORMAL
EOSINOPHIL # BLD AUTO: 0.1 K/UL (ref 0–0.5)
EOSINOPHIL NFR BLD: 0.6 % (ref 0–8)
ERYTHROCYTE [DISTWIDTH] IN BLOOD BY AUTOMATED COUNT: 18.8 % (ref 11.5–14.5)
EST. GFR  (AFRICAN AMERICAN): >60 ML/MIN/1.73 M^2
EST. GFR  (NON AFRICAN AMERICAN): >60 ML/MIN/1.73 M^2
GLUCOSE SERPL-MCNC: 92 MG/DL (ref 70–110)
HCT VFR BLD AUTO: 50.5 % (ref 40–54)
HGB BLD-MCNC: 15.6 G/DL (ref 14–18)
IMM GRANULOCYTES # BLD AUTO: 0.05 K/UL (ref 0–0.04)
IMM GRANULOCYTES NFR BLD AUTO: 0.4 % (ref 0–0.5)
LYMPHOCYTES # BLD AUTO: 0.6 K/UL (ref 1–4.8)
LYMPHOCYTES NFR BLD: 4.7 % (ref 18–48)
MAGNESIUM SERPL-MCNC: 1.9 MG/DL (ref 1.6–2.6)
MCH RBC QN AUTO: 26.2 PG (ref 27–31)
MCHC RBC AUTO-ENTMCNC: 30.9 G/DL (ref 32–36)
MCV RBC AUTO: 85 FL (ref 82–98)
MONOCYTES # BLD AUTO: 1.4 K/UL (ref 0.3–1)
MONOCYTES NFR BLD: 11.1 % (ref 4–15)
NEUTROPHILS # BLD AUTO: 10.4 K/UL (ref 1.8–7.7)
NEUTROPHILS NFR BLD: 83 % (ref 38–73)
NRBC BLD-RTO: 0 /100 WBC
PHOSPHATE SERPL-MCNC: 2.7 MG/DL (ref 2.7–4.5)
PLATELET # BLD AUTO: 219 K/UL (ref 150–450)
PMV BLD AUTO: 10 FL (ref 9.2–12.9)
POTASSIUM SERPL-SCNC: 4.5 MMOL/L (ref 3.5–5.1)
PROT SERPL-MCNC: 7.1 G/DL (ref 6–8.4)
RBC # BLD AUTO: 5.96 M/UL (ref 4.6–6.2)
SODIUM SERPL-SCNC: 137 MMOL/L (ref 136–145)
WBC # BLD AUTO: 12.48 K/UL (ref 3.9–12.7)

## 2021-05-31 PROCEDURE — 63600175 PHARM REV CODE 636 W HCPCS: Performed by: INTERNAL MEDICINE

## 2021-05-31 PROCEDURE — 25000003 PHARM REV CODE 250: Performed by: INTERNAL MEDICINE

## 2021-05-31 PROCEDURE — 84100 ASSAY OF PHOSPHORUS: CPT | Performed by: INTERNAL MEDICINE

## 2021-05-31 PROCEDURE — 85025 COMPLETE CBC W/AUTO DIFF WBC: CPT | Performed by: INTERNAL MEDICINE

## 2021-05-31 PROCEDURE — 96368 THER/DIAG CONCURRENT INF: CPT

## 2021-05-31 PROCEDURE — 96416 CHEMO PROLONG INFUSE W/PUMP: CPT

## 2021-05-31 PROCEDURE — 83735 ASSAY OF MAGNESIUM: CPT | Performed by: INTERNAL MEDICINE

## 2021-05-31 PROCEDURE — A4216 STERILE WATER/SALINE, 10 ML: HCPCS | Performed by: INTERNAL MEDICINE

## 2021-05-31 PROCEDURE — 80053 COMPREHEN METABOLIC PANEL: CPT | Performed by: INTERNAL MEDICINE

## 2021-05-31 PROCEDURE — 96367 TX/PROPH/DG ADDL SEQ IV INF: CPT

## 2021-05-31 PROCEDURE — 96521 REFILL/MAINT PORTABLE PUMP: CPT

## 2021-05-31 PROCEDURE — 96413 CHEMO IV INFUSION 1 HR: CPT

## 2021-05-31 RX ORDER — HEPARIN 100 UNIT/ML
500 SYRINGE INTRAVENOUS
OUTPATIENT
Start: 2021-05-31

## 2021-05-31 RX ORDER — SODIUM CHLORIDE 0.9 % (FLUSH) 0.9 %
10 SYRINGE (ML) INJECTION
OUTPATIENT
Start: 2021-05-31

## 2021-05-31 RX ORDER — HEPARIN 100 UNIT/ML
500 SYRINGE INTRAVENOUS
Status: DISCONTINUED | OUTPATIENT
Start: 2021-05-31 | End: 2021-05-31 | Stop reason: HOSPADM

## 2021-05-31 RX ORDER — HEPARIN 100 UNIT/ML
500 SYRINGE INTRAVENOUS
Status: CANCELLED | OUTPATIENT
Start: 2021-06-01

## 2021-05-31 RX ORDER — SODIUM CHLORIDE 0.9 % (FLUSH) 0.9 %
10 SYRINGE (ML) INJECTION
Status: DISCONTINUED | OUTPATIENT
Start: 2021-05-31 | End: 2021-05-31 | Stop reason: HOSPADM

## 2021-05-31 RX ORDER — SODIUM CHLORIDE 0.9 % (FLUSH) 0.9 %
10 SYRINGE (ML) INJECTION
Status: CANCELLED | OUTPATIENT
Start: 2021-05-31

## 2021-05-31 RX ORDER — SODIUM CHLORIDE 0.9 % (FLUSH) 0.9 %
10 SYRINGE (ML) INJECTION
Status: CANCELLED | OUTPATIENT
Start: 2021-06-01

## 2021-05-31 RX ORDER — HEPARIN 100 UNIT/ML
500 SYRINGE INTRAVENOUS
Status: CANCELLED | OUTPATIENT
Start: 2021-05-31

## 2021-05-31 RX ORDER — ONDANSETRON 8 MG/1
8 TABLET, ORALLY DISINTEGRATING ORAL EVERY 8 HOURS PRN
Qty: 60 TABLET | Refills: 6 | Status: SHIPPED | OUTPATIENT
Start: 2021-05-31 | End: 2021-06-30

## 2021-05-31 RX ORDER — PROMETHAZINE HYDROCHLORIDE 12.5 MG/1
25 TABLET ORAL EVERY 4 HOURS PRN
Qty: 30 TABLET | Refills: 6 | Status: SHIPPED | OUTPATIENT
Start: 2021-05-31

## 2021-05-31 RX ADMIN — SODIUM CHLORIDE: 9 INJECTION, SOLUTION INTRAVENOUS at 03:05

## 2021-05-31 RX ADMIN — DEXAMETHASONE SODIUM PHOSPHATE 0.25 MG: 4 INJECTION, SOLUTION INTRA-ARTICULAR; INTRALESIONAL; INTRAMUSCULAR; INTRAVENOUS; SOFT TISSUE at 02:05

## 2021-05-31 RX ADMIN — FLUOROURACIL 3310 MG: 50 INJECTION, SOLUTION INTRAVENOUS at 04:05

## 2021-05-31 RX ADMIN — IRINOTECAN HYDROCHLORIDE 248 MG: 20 INJECTION, SOLUTION INTRAVENOUS at 03:05

## 2021-05-31 RX ADMIN — SODIUM CHLORIDE: 0.9 INJECTION, SOLUTION INTRAVENOUS at 02:05

## 2021-05-31 RX ADMIN — LEUCOVORIN CALCIUM 1105 MG: 500 INJECTION, POWDER, LYOPHILIZED, FOR SOLUTION INTRAMUSCULAR; INTRAVENOUS at 03:05

## 2021-05-31 RX ADMIN — HEPARIN 500 UNITS: 100 SYRINGE at 11:05

## 2021-05-31 RX ADMIN — Medication 10 ML: at 11:05

## 2021-06-02 ENCOUNTER — INFUSION (OUTPATIENT)
Dept: INFUSION THERAPY | Facility: HOSPITAL | Age: 51
End: 2021-06-02
Payer: COMMERCIAL

## 2021-06-02 VITALS — DIASTOLIC BLOOD PRESSURE: 80 MMHG | SYSTOLIC BLOOD PRESSURE: 165 MMHG | HEART RATE: 106 BPM | RESPIRATION RATE: 18 BRPM

## 2021-06-02 DIAGNOSIS — C18.9 ADENOCARCINOMA OF COLON: Primary | ICD-10-CM

## 2021-06-02 PROCEDURE — A4216 STERILE WATER/SALINE, 10 ML: HCPCS | Performed by: INTERNAL MEDICINE

## 2021-06-02 PROCEDURE — 63600175 PHARM REV CODE 636 W HCPCS: Performed by: INTERNAL MEDICINE

## 2021-06-02 PROCEDURE — 25000003 PHARM REV CODE 250: Performed by: INTERNAL MEDICINE

## 2021-06-02 RX ORDER — SODIUM CHLORIDE 0.9 % (FLUSH) 0.9 %
10 SYRINGE (ML) INJECTION
Status: DISCONTINUED | OUTPATIENT
Start: 2021-06-02 | End: 2021-06-02 | Stop reason: HOSPADM

## 2021-06-02 RX ORDER — HEPARIN 100 UNIT/ML
500 SYRINGE INTRAVENOUS
Status: DISCONTINUED | OUTPATIENT
Start: 2021-06-02 | End: 2021-06-02 | Stop reason: HOSPADM

## 2021-06-02 RX ORDER — CHLORHEXIDINE GLUCONATE ORAL RINSE 1.2 MG/ML
15 SOLUTION DENTAL 2 TIMES DAILY
Qty: 240 ML | Refills: 3 | Status: SHIPPED | OUTPATIENT
Start: 2021-06-02 | End: 2021-06-16

## 2021-06-02 RX ADMIN — Medication 10 ML: at 04:06

## 2021-06-02 RX ADMIN — HEPARIN 500 UNITS: 100 SYRINGE at 04:06

## 2021-06-03 ENCOUNTER — PATIENT MESSAGE (OUTPATIENT)
Dept: HEMATOLOGY/ONCOLOGY | Facility: CLINIC | Age: 51
End: 2021-06-03

## 2021-06-07 ENCOUNTER — OFFICE VISIT (OUTPATIENT)
Dept: HEMATOLOGY/ONCOLOGY | Facility: CLINIC | Age: 51
End: 2021-06-07
Payer: COMMERCIAL

## 2021-06-07 DIAGNOSIS — C18.9 MALIGNANT NEOPLASM OF COLON METASTATIC TO PERITONEUM: ICD-10-CM

## 2021-06-07 DIAGNOSIS — C18.9 ADENOCARCINOMA OF COLON: Primary | ICD-10-CM

## 2021-06-07 DIAGNOSIS — F41.9 ANXIETY: ICD-10-CM

## 2021-06-07 DIAGNOSIS — R53.83 FATIGUE, UNSPECIFIED TYPE: ICD-10-CM

## 2021-06-07 DIAGNOSIS — T45.1X5A CHEMOTHERAPY INDUCED NAUSEA AND VOMITING: ICD-10-CM

## 2021-06-07 DIAGNOSIS — M62.838 MUSCLE SPASM: ICD-10-CM

## 2021-06-07 DIAGNOSIS — R11.2 CHEMOTHERAPY INDUCED NAUSEA AND VOMITING: ICD-10-CM

## 2021-06-07 DIAGNOSIS — C78.6 MALIGNANT NEOPLASM OF COLON METASTATIC TO PERITONEUM: ICD-10-CM

## 2021-06-07 DIAGNOSIS — E66.01 CLASS 3 SEVERE OBESITY WITHOUT SERIOUS COMORBIDITY WITH BODY MASS INDEX (BMI) OF 40.0 TO 44.9 IN ADULT, UNSPECIFIED OBESITY TYPE: ICD-10-CM

## 2021-06-07 PROCEDURE — 99417 PR PROLONGED SVC, OUTPT, W/WO DIRECT PT CONTACT,  EA ADDTL 15 MIN: ICD-10-PCS | Mod: 95,,, | Performed by: INTERNAL MEDICINE

## 2021-06-07 PROCEDURE — 99417 PROLNG OP E/M EACH 15 MIN: CPT | Mod: 95,,, | Performed by: INTERNAL MEDICINE

## 2021-06-07 PROCEDURE — 99215 OFFICE O/P EST HI 40 MIN: CPT | Mod: 95,,, | Performed by: INTERNAL MEDICINE

## 2021-06-07 PROCEDURE — 99215 PR OFFICE/OUTPT VISIT, EST, LEVL V, 40-54 MIN: ICD-10-PCS | Mod: 95,,, | Performed by: INTERNAL MEDICINE

## 2021-06-07 RX ORDER — ATROPINE SULFATE 0.4 MG/ML
0.2 INJECTION, SOLUTION ENDOTRACHEAL; INTRAMEDULLARY; INTRAMUSCULAR; INTRAVENOUS; SUBCUTANEOUS
Status: CANCELLED
Start: 2021-06-13

## 2021-06-07 RX ORDER — HEPARIN 100 UNIT/ML
500 SYRINGE INTRAVENOUS
Status: CANCELLED | OUTPATIENT
Start: 2021-06-13

## 2021-06-07 RX ORDER — HEPARIN 100 UNIT/ML
500 SYRINGE INTRAVENOUS
Status: CANCELLED | OUTPATIENT
Start: 2021-06-15

## 2021-06-07 RX ORDER — SODIUM CHLORIDE 0.9 % (FLUSH) 0.9 %
10 SYRINGE (ML) INJECTION
Status: CANCELLED | OUTPATIENT
Start: 2021-06-13

## 2021-06-07 RX ORDER — SODIUM CHLORIDE 0.9 % (FLUSH) 0.9 %
10 SYRINGE (ML) INJECTION
Status: CANCELLED | OUTPATIENT
Start: 2021-06-15

## 2021-06-08 ENCOUNTER — PATIENT MESSAGE (OUTPATIENT)
Dept: HEMATOLOGY/ONCOLOGY | Facility: CLINIC | Age: 51
End: 2021-06-08

## 2021-06-14 ENCOUNTER — INFUSION (OUTPATIENT)
Dept: INFUSION THERAPY | Facility: HOSPITAL | Age: 51
End: 2021-06-14
Payer: COMMERCIAL

## 2021-06-14 VITALS
SYSTOLIC BLOOD PRESSURE: 160 MMHG | RESPIRATION RATE: 18 BRPM | DIASTOLIC BLOOD PRESSURE: 82 MMHG | TEMPERATURE: 98 F | OXYGEN SATURATION: 99 % | HEART RATE: 108 BPM

## 2021-06-14 DIAGNOSIS — C18.9 ADENOCARCINOMA OF COLON: Primary | ICD-10-CM

## 2021-06-14 PROCEDURE — 96413 CHEMO IV INFUSION 1 HR: CPT

## 2021-06-14 PROCEDURE — 96368 THER/DIAG CONCURRENT INF: CPT

## 2021-06-14 PROCEDURE — 63600175 PHARM REV CODE 636 W HCPCS: Performed by: INTERNAL MEDICINE

## 2021-06-14 PROCEDURE — 96416 CHEMO PROLONG INFUSE W/PUMP: CPT

## 2021-06-14 PROCEDURE — 96367 TX/PROPH/DG ADDL SEQ IV INF: CPT

## 2021-06-14 PROCEDURE — 96375 TX/PRO/DX INJ NEW DRUG ADDON: CPT

## 2021-06-14 PROCEDURE — 96415 CHEMO IV INFUSION ADDL HR: CPT

## 2021-06-14 PROCEDURE — 25000003 PHARM REV CODE 250: Performed by: INTERNAL MEDICINE

## 2021-06-14 RX ORDER — HEPARIN 100 UNIT/ML
500 SYRINGE INTRAVENOUS
Status: DISCONTINUED | OUTPATIENT
Start: 2021-06-14 | End: 2021-06-14 | Stop reason: HOSPADM

## 2021-06-14 RX ORDER — SODIUM CHLORIDE 0.9 % (FLUSH) 0.9 %
10 SYRINGE (ML) INJECTION
Status: DISCONTINUED | OUTPATIENT
Start: 2021-06-14 | End: 2021-06-14 | Stop reason: HOSPADM

## 2021-06-14 RX ORDER — ATROPINE SULFATE 0.4 MG/ML
0.2 INJECTION, SOLUTION ENDOTRACHEAL; INTRAMEDULLARY; INTRAMUSCULAR; INTRAVENOUS; SUBCUTANEOUS
Status: COMPLETED | OUTPATIENT
Start: 2021-06-14 | End: 2021-06-14

## 2021-06-14 RX ADMIN — LEUCOVORIN CALCIUM 1105 MG: 500 INJECTION, POWDER, LYOPHILIZED, FOR SOLUTION INTRAMUSCULAR; INTRAVENOUS at 02:06

## 2021-06-14 RX ADMIN — ATROPINE SULFATE 0.2 MG: 0.4 INJECTION, SOLUTION INTRAMUSCULAR; INTRAVENOUS; SUBCUTANEOUS at 01:06

## 2021-06-14 RX ADMIN — APREPITANT 130 MG: 130 INJECTION, EMULSION INTRAVENOUS at 01:06

## 2021-06-14 RX ADMIN — DEXAMETHASONE SODIUM PHOSPHATE 0.25 MG: 4 INJECTION, SOLUTION INTRA-ARTICULAR; INTRALESIONAL; INTRAMUSCULAR; INTRAVENOUS; SOFT TISSUE at 01:06

## 2021-06-14 RX ADMIN — IRINOTECAN HYDROCHLORIDE 372 MG: 20 INJECTION, SOLUTION INTRAVENOUS at 02:06

## 2021-06-14 RX ADMIN — SODIUM CHLORIDE: 9 INJECTION, SOLUTION INTRAVENOUS at 01:06

## 2021-06-14 RX ADMIN — FLUOROURACIL 3310 MG: 50 INJECTION, SOLUTION INTRAVENOUS at 03:06

## 2021-06-16 ENCOUNTER — INFUSION (OUTPATIENT)
Dept: INFUSION THERAPY | Facility: HOSPITAL | Age: 51
End: 2021-06-16
Payer: COMMERCIAL

## 2021-06-16 VITALS — HEART RATE: 75 BPM | TEMPERATURE: 98 F | DIASTOLIC BLOOD PRESSURE: 72 MMHG | SYSTOLIC BLOOD PRESSURE: 125 MMHG

## 2021-06-16 DIAGNOSIS — C18.9 ADENOCARCINOMA OF COLON: Primary | ICD-10-CM

## 2021-06-16 PROCEDURE — 63600175 PHARM REV CODE 636 W HCPCS: Performed by: INTERNAL MEDICINE

## 2021-06-16 RX ORDER — HEPARIN 100 UNIT/ML
500 SYRINGE INTRAVENOUS
Status: DISCONTINUED | OUTPATIENT
Start: 2021-06-16 | End: 2021-06-16 | Stop reason: HOSPADM

## 2021-06-16 RX ORDER — SODIUM CHLORIDE 0.9 % (FLUSH) 0.9 %
10 SYRINGE (ML) INJECTION
Status: DISCONTINUED | OUTPATIENT
Start: 2021-06-16 | End: 2021-06-16 | Stop reason: HOSPADM

## 2021-06-16 RX ADMIN — HEPARIN SODIUM (PORCINE) LOCK FLUSH IV SOLN 100 UNIT/ML 500 UNITS: 100 SOLUTION at 02:06

## 2021-06-17 ENCOUNTER — TELEPHONE (OUTPATIENT)
Dept: HEMATOLOGY/ONCOLOGY | Facility: CLINIC | Age: 51
End: 2021-06-17

## 2021-06-23 ENCOUNTER — TELEPHONE (OUTPATIENT)
Dept: HEMATOLOGY/ONCOLOGY | Facility: CLINIC | Age: 51
End: 2021-06-23

## 2021-06-23 ENCOUNTER — PATIENT MESSAGE (OUTPATIENT)
Dept: HEMATOLOGY/ONCOLOGY | Facility: CLINIC | Age: 51
End: 2021-06-23

## 2021-06-23 ENCOUNTER — OFFICE VISIT (OUTPATIENT)
Dept: HEMATOLOGY/ONCOLOGY | Facility: CLINIC | Age: 51
End: 2021-06-23
Payer: COMMERCIAL

## 2021-06-23 VITALS
BODY MASS INDEX: 39.17 KG/M2 | HEIGHT: 75 IN | WEIGHT: 315 LBS | TEMPERATURE: 98 F | RESPIRATION RATE: 18 BRPM | DIASTOLIC BLOOD PRESSURE: 91 MMHG | OXYGEN SATURATION: 98 % | SYSTOLIC BLOOD PRESSURE: 133 MMHG | HEART RATE: 111 BPM

## 2021-06-23 DIAGNOSIS — T45.1X5A CHEMOTHERAPY INDUCED NAUSEA AND VOMITING: Primary | ICD-10-CM

## 2021-06-23 DIAGNOSIS — R53.1 GENERAL WEAKNESS: ICD-10-CM

## 2021-06-23 DIAGNOSIS — S30.1XXD ABDOMINAL WALL SEROMA, SUBSEQUENT ENCOUNTER: ICD-10-CM

## 2021-06-23 DIAGNOSIS — C18.9 ADENOCARCINOMA OF COLON: Primary | ICD-10-CM

## 2021-06-23 DIAGNOSIS — R53.83 FATIGUE, UNSPECIFIED TYPE: ICD-10-CM

## 2021-06-23 DIAGNOSIS — L03.90 CELLULITIS, UNSPECIFIED CELLULITIS SITE: ICD-10-CM

## 2021-06-23 DIAGNOSIS — K59.00 CONSTIPATION, UNSPECIFIED CONSTIPATION TYPE: ICD-10-CM

## 2021-06-23 DIAGNOSIS — R11.2 CHEMOTHERAPY INDUCED NAUSEA AND VOMITING: Primary | ICD-10-CM

## 2021-06-23 PROBLEM — L02.211 ABDOMINAL WALL ABSCESS: Status: ACTIVE | Noted: 2021-06-23

## 2021-06-23 PROBLEM — E11.9 TYPE 2 DIABETES MELLITUS WITHOUT COMPLICATION, WITHOUT LONG-TERM CURRENT USE OF INSULIN: Status: ACTIVE | Noted: 2021-06-23

## 2021-06-23 PROCEDURE — 1125F AMNT PAIN NOTED PAIN PRSNT: CPT | Mod: S$GLB,,, | Performed by: INTERNAL MEDICINE

## 2021-06-23 PROCEDURE — 99999 PR PBB SHADOW E&M-EST. PATIENT-LVL III: CPT | Mod: PBBFAC,,, | Performed by: INTERNAL MEDICINE

## 2021-06-23 PROCEDURE — 3008F PR BODY MASS INDEX (BMI) DOCUMENTED: ICD-10-PCS | Mod: CPTII,S$GLB,, | Performed by: INTERNAL MEDICINE

## 2021-06-23 PROCEDURE — 99215 OFFICE O/P EST HI 40 MIN: CPT | Mod: S$GLB,,, | Performed by: INTERNAL MEDICINE

## 2021-06-23 PROCEDURE — 3008F BODY MASS INDEX DOCD: CPT | Mod: CPTII,S$GLB,, | Performed by: INTERNAL MEDICINE

## 2021-06-23 PROCEDURE — 1125F PR PAIN SEVERITY QUANTIFIED, PAIN PRESENT: ICD-10-PCS | Mod: S$GLB,,, | Performed by: INTERNAL MEDICINE

## 2021-06-23 PROCEDURE — 99215 PR OFFICE/OUTPT VISIT, EST, LEVL V, 40-54 MIN: ICD-10-PCS | Mod: S$GLB,,, | Performed by: INTERNAL MEDICINE

## 2021-06-23 PROCEDURE — 99999 PR PBB SHADOW E&M-EST. PATIENT-LVL III: ICD-10-PCS | Mod: PBBFAC,,, | Performed by: INTERNAL MEDICINE

## 2021-06-24 ENCOUNTER — PATIENT MESSAGE (OUTPATIENT)
Dept: HEMATOLOGY/ONCOLOGY | Facility: CLINIC | Age: 51
End: 2021-06-24

## 2021-06-24 DIAGNOSIS — F41.9 ANXIETY: ICD-10-CM

## 2021-06-24 PROBLEM — R00.0 SINUS TACHYCARDIA: Status: ACTIVE | Noted: 2021-06-24

## 2021-06-24 RX ORDER — ALPRAZOLAM 0.5 MG/1
0.5 TABLET ORAL DAILY
Qty: 60 TABLET | Refills: 0 | Status: SHIPPED | OUTPATIENT
Start: 2021-06-24 | End: 2021-09-03 | Stop reason: SDUPTHER

## 2021-06-25 ENCOUNTER — DOCUMENTATION ONLY (OUTPATIENT)
Dept: INFUSION THERAPY | Facility: HOSPITAL | Age: 51
End: 2021-06-25

## 2021-06-25 PROBLEM — E66.01 CLASS 3 SEVERE OBESITY WITHOUT SERIOUS COMORBIDITY WITH BODY MASS INDEX (BMI) OF 40.0 TO 44.9 IN ADULT: Status: RESOLVED | Noted: 2018-01-31 | Resolved: 2021-06-25

## 2021-06-25 PROBLEM — E66.813 CLASS 3 SEVERE OBESITY WITHOUT SERIOUS COMORBIDITY WITH BODY MASS INDEX (BMI) OF 40.0 TO 44.9 IN ADULT: Status: RESOLVED | Noted: 2018-01-31 | Resolved: 2021-06-25

## 2021-06-28 ENCOUNTER — PATIENT MESSAGE (OUTPATIENT)
Dept: HEMATOLOGY/ONCOLOGY | Facility: CLINIC | Age: 51
End: 2021-06-28

## 2021-06-29 ENCOUNTER — DOCUMENTATION ONLY (OUTPATIENT)
Dept: HEMATOLOGY/ONCOLOGY | Facility: CLINIC | Age: 51
End: 2021-06-29

## 2021-06-29 ENCOUNTER — TELEPHONE (OUTPATIENT)
Dept: HEMATOLOGY/ONCOLOGY | Facility: CLINIC | Age: 51
End: 2021-06-29

## 2021-06-30 ENCOUNTER — TELEPHONE (OUTPATIENT)
Dept: HEMATOLOGY/ONCOLOGY | Facility: CLINIC | Age: 51
End: 2021-06-30

## 2021-07-02 ENCOUNTER — PATIENT MESSAGE (OUTPATIENT)
Dept: HEMATOLOGY/ONCOLOGY | Facility: CLINIC | Age: 51
End: 2021-07-02

## 2021-07-02 ENCOUNTER — TELEPHONE (OUTPATIENT)
Dept: HEMATOLOGY/ONCOLOGY | Facility: CLINIC | Age: 51
End: 2021-07-02

## 2021-07-02 ENCOUNTER — DOCUMENTATION ONLY (OUTPATIENT)
Dept: HEMATOLOGY/ONCOLOGY | Facility: CLINIC | Age: 51
End: 2021-07-02

## 2021-07-02 DIAGNOSIS — G89.3 CANCER ASSOCIATED PAIN: ICD-10-CM

## 2021-07-06 RX ORDER — MORPHINE SULFATE 15 MG/1
15 TABLET, FILM COATED, EXTENDED RELEASE ORAL 2 TIMES DAILY
Qty: 60 TABLET | Refills: 0 | Status: SHIPPED | OUTPATIENT
Start: 2021-07-06 | End: 2021-08-04 | Stop reason: SDUPTHER

## 2021-07-07 ENCOUNTER — TELEPHONE (OUTPATIENT)
Dept: SURGERY | Facility: CLINIC | Age: 51
End: 2021-07-07

## 2021-07-07 ENCOUNTER — OFFICE VISIT (OUTPATIENT)
Dept: HEMATOLOGY/ONCOLOGY | Facility: CLINIC | Age: 51
End: 2021-07-07
Payer: COMMERCIAL

## 2021-07-07 ENCOUNTER — OFFICE VISIT (OUTPATIENT)
Dept: INFECTIOUS DISEASES | Facility: CLINIC | Age: 51
End: 2021-07-07
Payer: COMMERCIAL

## 2021-07-07 ENCOUNTER — DOCUMENTATION ONLY (OUTPATIENT)
Dept: INFUSION THERAPY | Facility: HOSPITAL | Age: 51
End: 2021-07-07

## 2021-07-07 ENCOUNTER — PATIENT MESSAGE (OUTPATIENT)
Dept: HEMATOLOGY/ONCOLOGY | Facility: CLINIC | Age: 51
End: 2021-07-07

## 2021-07-07 VITALS
SYSTOLIC BLOOD PRESSURE: 136 MMHG | OXYGEN SATURATION: 96 % | HEIGHT: 75 IN | DIASTOLIC BLOOD PRESSURE: 84 MMHG | BODY MASS INDEX: 36.84 KG/M2 | TEMPERATURE: 98 F | WEIGHT: 296.31 LBS | RESPIRATION RATE: 18 BRPM | HEART RATE: 111 BPM

## 2021-07-07 DIAGNOSIS — G89.3 CANCER ASSOCIATED PAIN: ICD-10-CM

## 2021-07-07 DIAGNOSIS — C18.9 ADENOCARCINOMA OF COLON: Primary | ICD-10-CM

## 2021-07-07 DIAGNOSIS — R11.0 NAUSEA: ICD-10-CM

## 2021-07-07 DIAGNOSIS — R22.2 ABDOMINAL WALL MASS: ICD-10-CM

## 2021-07-07 DIAGNOSIS — L03.90 CELLULITIS, UNSPECIFIED CELLULITIS SITE: ICD-10-CM

## 2021-07-07 DIAGNOSIS — C78.6 MALIGNANT NEOPLASM OF COLON METASTATIC TO PERITONEUM: ICD-10-CM

## 2021-07-07 DIAGNOSIS — R53.83 FATIGUE, UNSPECIFIED TYPE: ICD-10-CM

## 2021-07-07 DIAGNOSIS — C18.9 MALIGNANT NEOPLASM OF COLON METASTATIC TO PERITONEUM: ICD-10-CM

## 2021-07-07 DIAGNOSIS — L02.211 ABDOMINAL WALL ABSCESS: Primary | ICD-10-CM

## 2021-07-07 PROCEDURE — 3008F BODY MASS INDEX DOCD: CPT | Mod: CPTII,S$GLB,, | Performed by: INTERNAL MEDICINE

## 2021-07-07 PROCEDURE — 99214 PR OFFICE/OUTPT VISIT, EST, LEVL IV, 30-39 MIN: ICD-10-PCS | Mod: S$GLB,,, | Performed by: INTERNAL MEDICINE

## 2021-07-07 PROCEDURE — 1111F PR DISCHARGE MEDS RECONCILED W/ CURRENT OUTPATIENT MED LIST: ICD-10-PCS | Mod: CPTII,S$GLB,, | Performed by: INTERNAL MEDICINE

## 2021-07-07 PROCEDURE — 99215 OFFICE O/P EST HI 40 MIN: CPT | Mod: S$GLB,,, | Performed by: INTERNAL MEDICINE

## 2021-07-07 PROCEDURE — 99999 PR PBB SHADOW E&M-EST. PATIENT-LVL IV: ICD-10-PCS | Mod: PBBFAC,,, | Performed by: INTERNAL MEDICINE

## 2021-07-07 PROCEDURE — 99215 PR OFFICE/OUTPT VISIT, EST, LEVL V, 40-54 MIN: ICD-10-PCS | Mod: S$GLB,,, | Performed by: INTERNAL MEDICINE

## 2021-07-07 PROCEDURE — 3008F PR BODY MASS INDEX (BMI) DOCUMENTED: ICD-10-PCS | Mod: CPTII,S$GLB,, | Performed by: INTERNAL MEDICINE

## 2021-07-07 PROCEDURE — 99999 PR PBB SHADOW E&M-EST. PATIENT-LVL II: ICD-10-PCS | Mod: PBBFAC,,, | Performed by: INTERNAL MEDICINE

## 2021-07-07 PROCEDURE — 99999 PR PBB SHADOW E&M-EST. PATIENT-LVL II: CPT | Mod: PBBFAC,,, | Performed by: INTERNAL MEDICINE

## 2021-07-07 PROCEDURE — 99999 PR PBB SHADOW E&M-EST. PATIENT-LVL IV: CPT | Mod: PBBFAC,,, | Performed by: INTERNAL MEDICINE

## 2021-07-07 PROCEDURE — 1125F PR PAIN SEVERITY QUANTIFIED, PAIN PRESENT: ICD-10-PCS | Mod: S$GLB,,, | Performed by: INTERNAL MEDICINE

## 2021-07-07 PROCEDURE — 1111F DSCHRG MED/CURRENT MED MERGE: CPT | Mod: CPTII,S$GLB,, | Performed by: INTERNAL MEDICINE

## 2021-07-07 PROCEDURE — 99214 OFFICE O/P EST MOD 30 MIN: CPT | Mod: S$GLB,,, | Performed by: INTERNAL MEDICINE

## 2021-07-07 PROCEDURE — 1125F AMNT PAIN NOTED PAIN PRSNT: CPT | Mod: S$GLB,,, | Performed by: INTERNAL MEDICINE

## 2021-07-07 RX ORDER — LINEZOLID 600 MG/1
600 TABLET, FILM COATED ORAL 2 TIMES DAILY
Qty: 28 TABLET | Refills: 0 | Status: SHIPPED | OUTPATIENT
Start: 2021-07-07 | End: 2021-07-15 | Stop reason: SDUPTHER

## 2021-07-07 RX ORDER — CHLORHEXIDINE GLUCONATE ORAL RINSE 1.2 MG/ML
SOLUTION DENTAL
COMMUNITY
Start: 2021-06-23 | End: 2023-10-04 | Stop reason: CLARIF

## 2021-07-07 RX ORDER — CIPROFLOXACIN 500 MG/1
500 TABLET ORAL 2 TIMES DAILY
Qty: 28 TABLET | Refills: 0 | Status: SHIPPED | OUTPATIENT
Start: 2021-07-07 | End: 2021-07-15 | Stop reason: SDUPTHER

## 2021-07-07 RX ORDER — METRONIDAZOLE 500 MG/1
500 TABLET ORAL 3 TIMES DAILY
Qty: 42 TABLET | Refills: 0 | Status: SHIPPED | OUTPATIENT
Start: 2021-07-07 | End: 2021-07-21

## 2021-07-07 RX ORDER — METRONIDAZOLE 500 MG/1
500 TABLET ORAL 3 TIMES DAILY
Qty: 30 TABLET | Refills: 0 | Status: SHIPPED | OUTPATIENT
Start: 2021-07-07 | End: 2021-07-07

## 2021-07-09 ENCOUNTER — OFFICE VISIT (OUTPATIENT)
Dept: SURGERY | Facility: CLINIC | Age: 51
End: 2021-07-09
Payer: COMMERCIAL

## 2021-07-09 ENCOUNTER — TELEPHONE (OUTPATIENT)
Dept: HEMATOLOGY/ONCOLOGY | Facility: CLINIC | Age: 51
End: 2021-07-09

## 2021-07-09 VITALS
DIASTOLIC BLOOD PRESSURE: 99 MMHG | HEIGHT: 75 IN | SYSTOLIC BLOOD PRESSURE: 156 MMHG | BODY MASS INDEX: 36.84 KG/M2 | WEIGHT: 296.31 LBS | HEART RATE: 117 BPM

## 2021-07-09 DIAGNOSIS — L02.211 ABDOMINAL WALL ABSCESS: Primary | ICD-10-CM

## 2021-07-09 PROCEDURE — 1111F PR DISCHARGE MEDS RECONCILED W/ CURRENT OUTPATIENT MED LIST: ICD-10-PCS | Mod: CPTII,S$GLB,, | Performed by: COLON & RECTAL SURGERY

## 2021-07-09 PROCEDURE — 10060 I&D ABSCESS SIMPLE/SINGLE: CPT | Mod: S$GLB,,, | Performed by: COLON & RECTAL SURGERY

## 2021-07-09 PROCEDURE — 3008F PR BODY MASS INDEX (BMI) DOCUMENTED: ICD-10-PCS | Mod: CPTII,S$GLB,, | Performed by: COLON & RECTAL SURGERY

## 2021-07-09 PROCEDURE — 3008F BODY MASS INDEX DOCD: CPT | Mod: CPTII,S$GLB,, | Performed by: COLON & RECTAL SURGERY

## 2021-07-09 PROCEDURE — 1125F AMNT PAIN NOTED PAIN PRSNT: CPT | Mod: S$GLB,,, | Performed by: COLON & RECTAL SURGERY

## 2021-07-09 PROCEDURE — 99214 PR OFFICE/OUTPT VISIT, EST, LEVL IV, 30-39 MIN: ICD-10-PCS | Mod: 25,S$GLB,, | Performed by: COLON & RECTAL SURGERY

## 2021-07-09 PROCEDURE — 99999 PR PBB SHADOW E&M-EST. PATIENT-LVL IV: CPT | Mod: PBBFAC,,, | Performed by: COLON & RECTAL SURGERY

## 2021-07-09 PROCEDURE — 99214 OFFICE O/P EST MOD 30 MIN: CPT | Mod: 25,S$GLB,, | Performed by: COLON & RECTAL SURGERY

## 2021-07-09 PROCEDURE — 10060 PR DRAIN SKIN ABSCESS SIMPLE: ICD-10-PCS | Mod: S$GLB,,, | Performed by: COLON & RECTAL SURGERY

## 2021-07-09 PROCEDURE — 99999 PR PBB SHADOW E&M-EST. PATIENT-LVL IV: ICD-10-PCS | Mod: PBBFAC,,, | Performed by: COLON & RECTAL SURGERY

## 2021-07-09 PROCEDURE — 1125F PR PAIN SEVERITY QUANTIFIED, PAIN PRESENT: ICD-10-PCS | Mod: S$GLB,,, | Performed by: COLON & RECTAL SURGERY

## 2021-07-09 PROCEDURE — 1111F DSCHRG MED/CURRENT MED MERGE: CPT | Mod: CPTII,S$GLB,, | Performed by: COLON & RECTAL SURGERY

## 2021-07-12 ENCOUNTER — PATIENT MESSAGE (OUTPATIENT)
Dept: HEMATOLOGY/ONCOLOGY | Facility: CLINIC | Age: 51
End: 2021-07-12

## 2021-07-12 DIAGNOSIS — G89.3 CANCER ASSOCIATED PAIN: Primary | ICD-10-CM

## 2021-07-12 RX ORDER — OXYCODONE AND ACETAMINOPHEN 10; 325 MG/1; MG/1
1 TABLET ORAL 3 TIMES DAILY PRN
Qty: 90 TABLET | Refills: 0 | Status: SHIPPED | OUTPATIENT
Start: 2021-07-12 | End: 2021-08-15 | Stop reason: SDUPTHER

## 2021-07-13 ENCOUNTER — TELEPHONE (OUTPATIENT)
Dept: INFECTIOUS DISEASES | Facility: CLINIC | Age: 51
End: 2021-07-13

## 2021-07-15 ENCOUNTER — OFFICE VISIT (OUTPATIENT)
Dept: INFECTIOUS DISEASES | Facility: CLINIC | Age: 51
End: 2021-07-15
Payer: COMMERCIAL

## 2021-07-15 DIAGNOSIS — L02.211 ABDOMINAL WALL ABSCESS: Primary | ICD-10-CM

## 2021-07-15 PROCEDURE — 1111F PR DISCHARGE MEDS RECONCILED W/ CURRENT OUTPATIENT MED LIST: ICD-10-PCS | Mod: CPTII,,, | Performed by: INTERNAL MEDICINE

## 2021-07-15 PROCEDURE — 99214 PR OFFICE/OUTPT VISIT, EST, LEVL IV, 30-39 MIN: ICD-10-PCS | Mod: 95,,, | Performed by: INTERNAL MEDICINE

## 2021-07-15 PROCEDURE — 99214 OFFICE O/P EST MOD 30 MIN: CPT | Mod: 95,,, | Performed by: INTERNAL MEDICINE

## 2021-07-15 PROCEDURE — 1111F DSCHRG MED/CURRENT MED MERGE: CPT | Mod: CPTII,,, | Performed by: INTERNAL MEDICINE

## 2021-07-15 RX ORDER — CIPROFLOXACIN 500 MG/1
500 TABLET ORAL 2 TIMES DAILY
Qty: 56 TABLET | Refills: 0 | Status: SHIPPED | OUTPATIENT
Start: 2021-07-15 | End: 2021-07-28 | Stop reason: ALTCHOICE

## 2021-07-15 RX ORDER — LINEZOLID 600 MG/1
600 TABLET, FILM COATED ORAL 2 TIMES DAILY
Qty: 56 TABLET | Refills: 0 | Status: SHIPPED | OUTPATIENT
Start: 2021-07-15 | End: 2021-08-10 | Stop reason: SDUPTHER

## 2021-07-16 ENCOUNTER — OFFICE VISIT (OUTPATIENT)
Dept: SURGERY | Facility: CLINIC | Age: 51
End: 2021-07-16
Payer: COMMERCIAL

## 2021-07-16 VITALS
HEART RATE: 107 BPM | SYSTOLIC BLOOD PRESSURE: 160 MMHG | HEIGHT: 75 IN | DIASTOLIC BLOOD PRESSURE: 95 MMHG | BODY MASS INDEX: 36.43 KG/M2 | WEIGHT: 293 LBS

## 2021-07-16 DIAGNOSIS — L02.211 ABDOMINAL WALL ABSCESS: Primary | ICD-10-CM

## 2021-07-16 PROCEDURE — 3008F BODY MASS INDEX DOCD: CPT | Mod: CPTII,S$GLB,, | Performed by: COLON & RECTAL SURGERY

## 2021-07-16 PROCEDURE — 99999 PR PBB SHADOW E&M-EST. PATIENT-LVL IV: ICD-10-PCS | Mod: PBBFAC,,, | Performed by: COLON & RECTAL SURGERY

## 2021-07-16 PROCEDURE — 99024 POSTOP FOLLOW-UP VISIT: CPT | Mod: S$GLB,,, | Performed by: COLON & RECTAL SURGERY

## 2021-07-16 PROCEDURE — 99024 PR POST-OP FOLLOW-UP VISIT: ICD-10-PCS | Mod: S$GLB,,, | Performed by: COLON & RECTAL SURGERY

## 2021-07-16 PROCEDURE — 3008F PR BODY MASS INDEX (BMI) DOCUMENTED: ICD-10-PCS | Mod: CPTII,S$GLB,, | Performed by: COLON & RECTAL SURGERY

## 2021-07-16 PROCEDURE — 1111F PR DISCHARGE MEDS RECONCILED W/ CURRENT OUTPATIENT MED LIST: ICD-10-PCS | Mod: CPTII,S$GLB,, | Performed by: COLON & RECTAL SURGERY

## 2021-07-16 PROCEDURE — 1111F DSCHRG MED/CURRENT MED MERGE: CPT | Mod: CPTII,S$GLB,, | Performed by: COLON & RECTAL SURGERY

## 2021-07-16 PROCEDURE — 1126F AMNT PAIN NOTED NONE PRSNT: CPT | Mod: S$GLB,,, | Performed by: COLON & RECTAL SURGERY

## 2021-07-16 PROCEDURE — 1126F PR PAIN SEVERITY QUANTIFIED, NO PAIN PRESENT: ICD-10-PCS | Mod: S$GLB,,, | Performed by: COLON & RECTAL SURGERY

## 2021-07-16 PROCEDURE — 99999 PR PBB SHADOW E&M-EST. PATIENT-LVL IV: CPT | Mod: PBBFAC,,, | Performed by: COLON & RECTAL SURGERY

## 2021-07-21 ENCOUNTER — TELEPHONE (OUTPATIENT)
Dept: INFUSION THERAPY | Facility: HOSPITAL | Age: 51
End: 2021-07-21

## 2021-07-22 ENCOUNTER — OFFICE VISIT (OUTPATIENT)
Dept: SURGERY | Facility: CLINIC | Age: 51
End: 2021-07-22
Payer: COMMERCIAL

## 2021-07-22 ENCOUNTER — PATIENT MESSAGE (OUTPATIENT)
Dept: HEMATOLOGY/ONCOLOGY | Facility: CLINIC | Age: 51
End: 2021-07-22

## 2021-07-22 VITALS
DIASTOLIC BLOOD PRESSURE: 92 MMHG | BODY MASS INDEX: 38.05 KG/M2 | SYSTOLIC BLOOD PRESSURE: 146 MMHG | WEIGHT: 306 LBS | HEIGHT: 75 IN

## 2021-07-22 DIAGNOSIS — S30.1XXD ABDOMINAL WALL SEROMA, SUBSEQUENT ENCOUNTER: Primary | ICD-10-CM

## 2021-07-22 DIAGNOSIS — M62.838 LEVATOR SPASM: ICD-10-CM

## 2021-07-22 PROCEDURE — 99999 PR PBB SHADOW E&M-EST. PATIENT-LVL V: CPT | Mod: PBBFAC,,, | Performed by: COLON & RECTAL SURGERY

## 2021-07-22 PROCEDURE — 3008F BODY MASS INDEX DOCD: CPT | Mod: CPTII,S$GLB,, | Performed by: COLON & RECTAL SURGERY

## 2021-07-22 PROCEDURE — 3077F SYST BP >= 140 MM HG: CPT | Mod: CPTII,S$GLB,, | Performed by: COLON & RECTAL SURGERY

## 2021-07-22 PROCEDURE — 1111F PR DISCHARGE MEDS RECONCILED W/ CURRENT OUTPATIENT MED LIST: ICD-10-PCS | Mod: CPTII,S$GLB,, | Performed by: COLON & RECTAL SURGERY

## 2021-07-22 PROCEDURE — 3080F DIAST BP >= 90 MM HG: CPT | Mod: CPTII,S$GLB,, | Performed by: COLON & RECTAL SURGERY

## 2021-07-22 PROCEDURE — 3077F PR MOST RECENT SYSTOLIC BLOOD PRESSURE >= 140 MM HG: ICD-10-PCS | Mod: CPTII,S$GLB,, | Performed by: COLON & RECTAL SURGERY

## 2021-07-22 PROCEDURE — 99999 PR PBB SHADOW E&M-EST. PATIENT-LVL V: ICD-10-PCS | Mod: PBBFAC,,, | Performed by: COLON & RECTAL SURGERY

## 2021-07-22 PROCEDURE — 3044F PR MOST RECENT HEMOGLOBIN A1C LEVEL <7.0%: ICD-10-PCS | Mod: CPTII,S$GLB,, | Performed by: COLON & RECTAL SURGERY

## 2021-07-22 PROCEDURE — 1125F AMNT PAIN NOTED PAIN PRSNT: CPT | Mod: CPTII,S$GLB,, | Performed by: COLON & RECTAL SURGERY

## 2021-07-22 PROCEDURE — 3044F HG A1C LEVEL LT 7.0%: CPT | Mod: CPTII,S$GLB,, | Performed by: COLON & RECTAL SURGERY

## 2021-07-22 PROCEDURE — 3080F PR MOST RECENT DIASTOLIC BLOOD PRESSURE >= 90 MM HG: ICD-10-PCS | Mod: CPTII,S$GLB,, | Performed by: COLON & RECTAL SURGERY

## 2021-07-22 PROCEDURE — 1111F DSCHRG MED/CURRENT MED MERGE: CPT | Mod: CPTII,S$GLB,, | Performed by: COLON & RECTAL SURGERY

## 2021-07-22 PROCEDURE — 3008F PR BODY MASS INDEX (BMI) DOCUMENTED: ICD-10-PCS | Mod: CPTII,S$GLB,, | Performed by: COLON & RECTAL SURGERY

## 2021-07-22 PROCEDURE — 1125F PR PAIN SEVERITY QUANTIFIED, PAIN PRESENT: ICD-10-PCS | Mod: CPTII,S$GLB,, | Performed by: COLON & RECTAL SURGERY

## 2021-07-22 PROCEDURE — 99214 OFFICE O/P EST MOD 30 MIN: CPT | Mod: S$GLB,,, | Performed by: COLON & RECTAL SURGERY

## 2021-07-22 PROCEDURE — 99214 PR OFFICE/OUTPT VISIT, EST, LEVL IV, 30-39 MIN: ICD-10-PCS | Mod: S$GLB,,, | Performed by: COLON & RECTAL SURGERY

## 2021-07-23 ENCOUNTER — OFFICE VISIT (OUTPATIENT)
Dept: HEMATOLOGY/ONCOLOGY | Facility: CLINIC | Age: 51
End: 2021-07-23
Payer: COMMERCIAL

## 2021-07-23 DIAGNOSIS — L03.90 CELLULITIS, UNSPECIFIED CELLULITIS SITE: ICD-10-CM

## 2021-07-23 DIAGNOSIS — G89.3 CANCER ASSOCIATED PAIN: ICD-10-CM

## 2021-07-23 DIAGNOSIS — C78.6 MALIGNANT NEOPLASM OF COLON METASTATIC TO PERITONEUM: ICD-10-CM

## 2021-07-23 DIAGNOSIS — R53.83 FATIGUE, UNSPECIFIED TYPE: ICD-10-CM

## 2021-07-23 DIAGNOSIS — R45.89 ANXIETY ABOUT HEALTH: ICD-10-CM

## 2021-07-23 DIAGNOSIS — C18.9 MALIGNANT NEOPLASM OF COLON METASTATIC TO PERITONEUM: ICD-10-CM

## 2021-07-23 DIAGNOSIS — C18.9 ADENOCARCINOMA OF COLON: Primary | ICD-10-CM

## 2021-07-23 PROCEDURE — 1111F PR DISCHARGE MEDS RECONCILED W/ CURRENT OUTPATIENT MED LIST: ICD-10-PCS | Mod: CPTII,,, | Performed by: INTERNAL MEDICINE

## 2021-07-23 PROCEDURE — 1160F RVW MEDS BY RX/DR IN RCRD: CPT | Mod: CPTII,,, | Performed by: INTERNAL MEDICINE

## 2021-07-23 PROCEDURE — 99214 OFFICE O/P EST MOD 30 MIN: CPT | Mod: 95,,, | Performed by: INTERNAL MEDICINE

## 2021-07-23 PROCEDURE — 1159F MED LIST DOCD IN RCRD: CPT | Mod: CPTII,,, | Performed by: INTERNAL MEDICINE

## 2021-07-23 PROCEDURE — 3044F PR MOST RECENT HEMOGLOBIN A1C LEVEL <7.0%: ICD-10-PCS | Mod: CPTII,,, | Performed by: INTERNAL MEDICINE

## 2021-07-23 PROCEDURE — 1159F PR MEDICATION LIST DOCUMENTED IN MEDICAL RECORD: ICD-10-PCS | Mod: CPTII,,, | Performed by: INTERNAL MEDICINE

## 2021-07-23 PROCEDURE — 1160F PR REVIEW ALL MEDS BY PRESCRIBER/CLIN PHARMACIST DOCUMENTED: ICD-10-PCS | Mod: CPTII,,, | Performed by: INTERNAL MEDICINE

## 2021-07-23 PROCEDURE — 99214 PR OFFICE/OUTPT VISIT, EST, LEVL IV, 30-39 MIN: ICD-10-PCS | Mod: 95,,, | Performed by: INTERNAL MEDICINE

## 2021-07-23 PROCEDURE — 3044F HG A1C LEVEL LT 7.0%: CPT | Mod: CPTII,,, | Performed by: INTERNAL MEDICINE

## 2021-07-23 PROCEDURE — 1111F DSCHRG MED/CURRENT MED MERGE: CPT | Mod: CPTII,,, | Performed by: INTERNAL MEDICINE

## 2021-07-28 ENCOUNTER — OFFICE VISIT (OUTPATIENT)
Dept: INFECTIOUS DISEASES | Facility: CLINIC | Age: 51
End: 2021-07-28
Payer: COMMERCIAL

## 2021-07-28 DIAGNOSIS — Z79.2 ENCOUNTER FOR LONG-TERM (CURRENT) USE OF ANTIBIOTICS: ICD-10-CM

## 2021-07-28 DIAGNOSIS — L02.211 ABDOMINAL WALL ABSCESS: Primary | ICD-10-CM

## 2021-07-28 PROCEDURE — 1160F RVW MEDS BY RX/DR IN RCRD: CPT | Mod: CPTII,,, | Performed by: INTERNAL MEDICINE

## 2021-07-28 PROCEDURE — 1159F PR MEDICATION LIST DOCUMENTED IN MEDICAL RECORD: ICD-10-PCS | Mod: CPTII,,, | Performed by: INTERNAL MEDICINE

## 2021-07-28 PROCEDURE — 99214 PR OFFICE/OUTPT VISIT, EST, LEVL IV, 30-39 MIN: ICD-10-PCS | Mod: 95,,, | Performed by: INTERNAL MEDICINE

## 2021-07-28 PROCEDURE — 3044F PR MOST RECENT HEMOGLOBIN A1C LEVEL <7.0%: ICD-10-PCS | Mod: CPTII,,, | Performed by: INTERNAL MEDICINE

## 2021-07-28 PROCEDURE — 3044F HG A1C LEVEL LT 7.0%: CPT | Mod: CPTII,,, | Performed by: INTERNAL MEDICINE

## 2021-07-28 PROCEDURE — 99214 OFFICE O/P EST MOD 30 MIN: CPT | Mod: 95,,, | Performed by: INTERNAL MEDICINE

## 2021-07-28 PROCEDURE — 1159F MED LIST DOCD IN RCRD: CPT | Mod: CPTII,,, | Performed by: INTERNAL MEDICINE

## 2021-07-28 PROCEDURE — 1160F PR REVIEW ALL MEDS BY PRESCRIBER/CLIN PHARMACIST DOCUMENTED: ICD-10-PCS | Mod: CPTII,,, | Performed by: INTERNAL MEDICINE

## 2021-07-28 RX ORDER — MOXIFLOXACIN HYDROCHLORIDE 400 MG/1
400 TABLET ORAL DAILY
Qty: 21 TABLET | Refills: 0 | Status: SHIPPED | OUTPATIENT
Start: 2021-07-28 | End: 2021-08-10 | Stop reason: SDUPTHER

## 2021-08-03 ENCOUNTER — OFFICE VISIT (OUTPATIENT)
Dept: SURGERY | Facility: CLINIC | Age: 51
End: 2021-08-03
Payer: COMMERCIAL

## 2021-08-03 VITALS
SYSTOLIC BLOOD PRESSURE: 144 MMHG | HEIGHT: 75 IN | WEIGHT: 296 LBS | HEART RATE: 78 BPM | BODY MASS INDEX: 36.8 KG/M2 | DIASTOLIC BLOOD PRESSURE: 77 MMHG

## 2021-08-03 DIAGNOSIS — C18.9 MALIGNANT NEOPLASM OF COLON METASTATIC TO PERITONEUM: Primary | ICD-10-CM

## 2021-08-03 DIAGNOSIS — Z01.818 PRE-OP TESTING: ICD-10-CM

## 2021-08-03 DIAGNOSIS — C78.6 MALIGNANT NEOPLASM OF COLON METASTATIC TO PERITONEUM: Primary | ICD-10-CM

## 2021-08-03 PROCEDURE — 99213 PR OFFICE/OUTPT VISIT, EST, LEVL III, 20-29 MIN: ICD-10-PCS | Mod: S$GLB,,, | Performed by: COLON & RECTAL SURGERY

## 2021-08-03 PROCEDURE — 1160F RVW MEDS BY RX/DR IN RCRD: CPT | Mod: CPTII,S$GLB,, | Performed by: COLON & RECTAL SURGERY

## 2021-08-03 PROCEDURE — 1125F AMNT PAIN NOTED PAIN PRSNT: CPT | Mod: CPTII,S$GLB,, | Performed by: COLON & RECTAL SURGERY

## 2021-08-03 PROCEDURE — 3044F HG A1C LEVEL LT 7.0%: CPT | Mod: CPTII,S$GLB,, | Performed by: COLON & RECTAL SURGERY

## 2021-08-03 PROCEDURE — 3008F PR BODY MASS INDEX (BMI) DOCUMENTED: ICD-10-PCS | Mod: CPTII,S$GLB,, | Performed by: COLON & RECTAL SURGERY

## 2021-08-03 PROCEDURE — 99999 PR PBB SHADOW E&M-EST. PATIENT-LVL IV: CPT | Mod: PBBFAC,,, | Performed by: COLON & RECTAL SURGERY

## 2021-08-03 PROCEDURE — 3044F PR MOST RECENT HEMOGLOBIN A1C LEVEL <7.0%: ICD-10-PCS | Mod: CPTII,S$GLB,, | Performed by: COLON & RECTAL SURGERY

## 2021-08-03 PROCEDURE — 1125F PR PAIN SEVERITY QUANTIFIED, PAIN PRESENT: ICD-10-PCS | Mod: CPTII,S$GLB,, | Performed by: COLON & RECTAL SURGERY

## 2021-08-03 PROCEDURE — 1160F PR REVIEW ALL MEDS BY PRESCRIBER/CLIN PHARMACIST DOCUMENTED: ICD-10-PCS | Mod: CPTII,S$GLB,, | Performed by: COLON & RECTAL SURGERY

## 2021-08-03 PROCEDURE — 3078F PR MOST RECENT DIASTOLIC BLOOD PRESSURE < 80 MM HG: ICD-10-PCS | Mod: CPTII,S$GLB,, | Performed by: COLON & RECTAL SURGERY

## 2021-08-03 PROCEDURE — 3077F PR MOST RECENT SYSTOLIC BLOOD PRESSURE >= 140 MM HG: ICD-10-PCS | Mod: CPTII,S$GLB,, | Performed by: COLON & RECTAL SURGERY

## 2021-08-03 PROCEDURE — 99999 PR PBB SHADOW E&M-EST. PATIENT-LVL IV: ICD-10-PCS | Mod: PBBFAC,,, | Performed by: COLON & RECTAL SURGERY

## 2021-08-03 PROCEDURE — 3008F BODY MASS INDEX DOCD: CPT | Mod: CPTII,S$GLB,, | Performed by: COLON & RECTAL SURGERY

## 2021-08-03 PROCEDURE — 99213 OFFICE O/P EST LOW 20 MIN: CPT | Mod: S$GLB,,, | Performed by: COLON & RECTAL SURGERY

## 2021-08-03 PROCEDURE — 1159F MED LIST DOCD IN RCRD: CPT | Mod: CPTII,S$GLB,, | Performed by: COLON & RECTAL SURGERY

## 2021-08-03 PROCEDURE — 1159F PR MEDICATION LIST DOCUMENTED IN MEDICAL RECORD: ICD-10-PCS | Mod: CPTII,S$GLB,, | Performed by: COLON & RECTAL SURGERY

## 2021-08-03 PROCEDURE — 3077F SYST BP >= 140 MM HG: CPT | Mod: CPTII,S$GLB,, | Performed by: COLON & RECTAL SURGERY

## 2021-08-03 PROCEDURE — 3078F DIAST BP <80 MM HG: CPT | Mod: CPTII,S$GLB,, | Performed by: COLON & RECTAL SURGERY

## 2021-08-04 ENCOUNTER — PATIENT MESSAGE (OUTPATIENT)
Dept: HEMATOLOGY/ONCOLOGY | Facility: CLINIC | Age: 51
End: 2021-08-04

## 2021-08-04 DIAGNOSIS — G89.3 CANCER ASSOCIATED PAIN: ICD-10-CM

## 2021-08-04 RX ORDER — MORPHINE SULFATE 15 MG/1
15 TABLET, FILM COATED, EXTENDED RELEASE ORAL 2 TIMES DAILY
Qty: 60 TABLET | Refills: 0 | Status: SHIPPED | OUTPATIENT
Start: 2021-08-04 | End: 2021-09-03 | Stop reason: SDUPTHER

## 2021-08-06 ENCOUNTER — PATIENT MESSAGE (OUTPATIENT)
Dept: HEMATOLOGY/ONCOLOGY | Facility: CLINIC | Age: 51
End: 2021-08-06

## 2021-08-06 ENCOUNTER — OFFICE VISIT (OUTPATIENT)
Dept: HEMATOLOGY/ONCOLOGY | Facility: CLINIC | Age: 51
End: 2021-08-06
Payer: COMMERCIAL

## 2021-08-06 DIAGNOSIS — G89.3 CANCER ASSOCIATED PAIN: ICD-10-CM

## 2021-08-06 DIAGNOSIS — C18.9 ADENOCARCINOMA OF COLON: Primary | ICD-10-CM

## 2021-08-06 DIAGNOSIS — R45.89 ANXIETY ABOUT HEALTH: ICD-10-CM

## 2021-08-06 DIAGNOSIS — S30.1XXD ABDOMINAL WALL SEROMA, SUBSEQUENT ENCOUNTER: ICD-10-CM

## 2021-08-06 DIAGNOSIS — C18.9 MALIGNANT NEOPLASM OF COLON METASTATIC TO PERITONEUM: ICD-10-CM

## 2021-08-06 DIAGNOSIS — C78.6 MALIGNANT NEOPLASM OF COLON METASTATIC TO PERITONEUM: ICD-10-CM

## 2021-08-06 DIAGNOSIS — R53.83 FATIGUE, UNSPECIFIED TYPE: ICD-10-CM

## 2021-08-06 PROCEDURE — 99215 PR OFFICE/OUTPT VISIT, EST, LEVL V, 40-54 MIN: ICD-10-PCS | Mod: 95,,, | Performed by: INTERNAL MEDICINE

## 2021-08-06 PROCEDURE — 3044F PR MOST RECENT HEMOGLOBIN A1C LEVEL <7.0%: ICD-10-PCS | Mod: CPTII,,, | Performed by: INTERNAL MEDICINE

## 2021-08-06 PROCEDURE — 3044F HG A1C LEVEL LT 7.0%: CPT | Mod: CPTII,,, | Performed by: INTERNAL MEDICINE

## 2021-08-06 PROCEDURE — 1160F PR REVIEW ALL MEDS BY PRESCRIBER/CLIN PHARMACIST DOCUMENTED: ICD-10-PCS | Mod: CPTII,,, | Performed by: INTERNAL MEDICINE

## 2021-08-06 PROCEDURE — 1160F RVW MEDS BY RX/DR IN RCRD: CPT | Mod: CPTII,,, | Performed by: INTERNAL MEDICINE

## 2021-08-06 PROCEDURE — 99215 OFFICE O/P EST HI 40 MIN: CPT | Mod: 95,,, | Performed by: INTERNAL MEDICINE

## 2021-08-06 PROCEDURE — 1159F MED LIST DOCD IN RCRD: CPT | Mod: CPTII,,, | Performed by: INTERNAL MEDICINE

## 2021-08-06 PROCEDURE — 1159F PR MEDICATION LIST DOCUMENTED IN MEDICAL RECORD: ICD-10-PCS | Mod: CPTII,,, | Performed by: INTERNAL MEDICINE

## 2021-08-08 ENCOUNTER — LAB VISIT (OUTPATIENT)
Dept: URGENT CARE | Facility: CLINIC | Age: 51
End: 2021-08-08
Payer: COMMERCIAL

## 2021-08-08 DIAGNOSIS — Z01.818 PRE-OP TESTING: ICD-10-CM

## 2021-08-08 PROCEDURE — U0003 INFECTIOUS AGENT DETECTION BY NUCLEIC ACID (DNA OR RNA); SEVERE ACUTE RESPIRATORY SYNDROME CORONAVIRUS 2 (SARS-COV-2) (CORONAVIRUS DISEASE [COVID-19]), AMPLIFIED PROBE TECHNIQUE, MAKING USE OF HIGH THROUGHPUT TECHNOLOGIES AS DESCRIBED BY CMS-2020-01-R: HCPCS | Performed by: COLON & RECTAL SURGERY

## 2021-08-08 PROCEDURE — U0005 INFEC AGEN DETEC AMPLI PROBE: HCPCS | Performed by: COLON & RECTAL SURGERY

## 2021-08-09 ENCOUNTER — PATIENT MESSAGE (OUTPATIENT)
Dept: INFECTIOUS DISEASES | Facility: CLINIC | Age: 51
End: 2021-08-09

## 2021-08-09 ENCOUNTER — PATIENT MESSAGE (OUTPATIENT)
Dept: SURGERY | Facility: HOSPITAL | Age: 51
End: 2021-08-09

## 2021-08-09 LAB
SARS-COV-2 RNA RESP QL NAA+PROBE: NOT DETECTED
SARS-COV-2- CYCLE NUMBER: -1

## 2021-08-10 ENCOUNTER — ANESTHESIA EVENT (OUTPATIENT)
Dept: SURGERY | Facility: HOSPITAL | Age: 51
End: 2021-08-10
Payer: COMMERCIAL

## 2021-08-10 ENCOUNTER — TELEPHONE (OUTPATIENT)
Dept: SURGERY | Facility: CLINIC | Age: 51
End: 2021-08-10

## 2021-08-10 DIAGNOSIS — L02.211 ABDOMINAL WALL ABSCESS: ICD-10-CM

## 2021-08-10 RX ORDER — MOXIFLOXACIN HYDROCHLORIDE 400 MG/1
400 TABLET ORAL DAILY
Qty: 30 TABLET | Refills: 0 | Status: SHIPPED | OUTPATIENT
Start: 2021-08-10 | End: 2021-08-25 | Stop reason: ALTCHOICE

## 2021-08-10 RX ORDER — LINEZOLID 600 MG/1
600 TABLET, FILM COATED ORAL 2 TIMES DAILY
Qty: 60 TABLET | Refills: 0 | Status: SHIPPED | OUTPATIENT
Start: 2021-08-10 | End: 2021-08-25 | Stop reason: SDUPTHER

## 2021-08-11 ENCOUNTER — HOSPITAL ENCOUNTER (OUTPATIENT)
Facility: HOSPITAL | Age: 51
Discharge: HOME | End: 2021-08-11
Attending: COLON & RECTAL SURGERY | Admitting: COLON & RECTAL SURGERY
Payer: COMMERCIAL

## 2021-08-11 ENCOUNTER — ANESTHESIA (OUTPATIENT)
Dept: SURGERY | Facility: HOSPITAL | Age: 51
End: 2021-08-11
Payer: COMMERCIAL

## 2021-08-11 VITALS
WEIGHT: 291 LBS | RESPIRATION RATE: 17 BRPM | HEART RATE: 69 BPM | OXYGEN SATURATION: 100 % | BODY MASS INDEX: 36.18 KG/M2 | HEIGHT: 75 IN | SYSTOLIC BLOOD PRESSURE: 122 MMHG | DIASTOLIC BLOOD PRESSURE: 73 MMHG | TEMPERATURE: 98 F

## 2021-08-11 DIAGNOSIS — K62.89 RECTAL PAIN: ICD-10-CM

## 2021-08-11 LAB
POCT GLUCOSE: 101 MG/DL (ref 70–110)
POCT GLUCOSE: 117 MG/DL (ref 70–110)

## 2021-08-11 PROCEDURE — 36000704 HC OR TIME LEV I 1ST 15 MIN: Performed by: COLON & RECTAL SURGERY

## 2021-08-11 PROCEDURE — 25000003 PHARM REV CODE 250: Performed by: NURSE PRACTITIONER

## 2021-08-11 PROCEDURE — 10140 I&D HMTMA SEROMA/FLUID COLLJ: CPT | Mod: ,,, | Performed by: COLON & RECTAL SURGERY

## 2021-08-11 PROCEDURE — 36000705 HC OR TIME LEV I EA ADD 15 MIN: Performed by: COLON & RECTAL SURGERY

## 2021-08-11 PROCEDURE — 71000015 HC POSTOP RECOV 1ST HR: Performed by: COLON & RECTAL SURGERY

## 2021-08-11 PROCEDURE — D9220A PRA ANESTHESIA: Mod: ,,, | Performed by: ANESTHESIOLOGY

## 2021-08-11 PROCEDURE — 25000003 PHARM REV CODE 250: Performed by: ANESTHESIOLOGY

## 2021-08-11 PROCEDURE — 45330 PR SIGMOIDOSCOPY,DIAG2STIC: ICD-10-PCS | Mod: 51,,, | Performed by: COLON & RECTAL SURGERY

## 2021-08-11 PROCEDURE — 82962 GLUCOSE BLOOD TEST: CPT | Performed by: COLON & RECTAL SURGERY

## 2021-08-11 PROCEDURE — D9220A PRA ANESTHESIA: ICD-10-PCS | Mod: ,,, | Performed by: ANESTHESIOLOGY

## 2021-08-11 PROCEDURE — 10140 PR DRAINAGE OF HEMATOMA/FLUID: ICD-10-PCS | Mod: ,,, | Performed by: COLON & RECTAL SURGERY

## 2021-08-11 PROCEDURE — 45330 DIAGNOSTIC SIGMOIDOSCOPY: CPT | Mod: 51,,, | Performed by: COLON & RECTAL SURGERY

## 2021-08-11 PROCEDURE — 46505 PR CHEMODENERVATION ANAL SPHINCTER: ICD-10-PCS | Mod: 51,,, | Performed by: COLON & RECTAL SURGERY

## 2021-08-11 PROCEDURE — 63600175 PHARM REV CODE 636 W HCPCS: Performed by: ANESTHESIOLOGY

## 2021-08-11 PROCEDURE — 37000009 HC ANESTHESIA EA ADD 15 MINS: Performed by: COLON & RECTAL SURGERY

## 2021-08-11 PROCEDURE — 71000044 HC DOSC ROUTINE RECOVERY FIRST HOUR: Performed by: COLON & RECTAL SURGERY

## 2021-08-11 PROCEDURE — 37000008 HC ANESTHESIA 1ST 15 MINUTES: Performed by: COLON & RECTAL SURGERY

## 2021-08-11 PROCEDURE — 46505 CHEMODENERVATION ANAL MUSC: CPT | Mod: 51,,, | Performed by: COLON & RECTAL SURGERY

## 2021-08-11 PROCEDURE — 63600175 PHARM REV CODE 636 W HCPCS: Mod: JG | Performed by: COLON & RECTAL SURGERY

## 2021-08-11 RX ORDER — SODIUM CHLORIDE 9 MG/ML
INJECTION, SOLUTION INTRAVENOUS CONTINUOUS
Status: DISCONTINUED | OUTPATIENT
Start: 2021-08-11 | End: 2021-08-11 | Stop reason: HOSPADM

## 2021-08-11 RX ORDER — MIDAZOLAM HYDROCHLORIDE 1 MG/ML
INJECTION, SOLUTION INTRAMUSCULAR; INTRAVENOUS
Status: DISCONTINUED | OUTPATIENT
Start: 2021-08-11 | End: 2021-08-11

## 2021-08-11 RX ORDER — SODIUM CHLORIDE 0.9 % (FLUSH) 0.9 %
3 SYRINGE (ML) INJECTION
Status: DISCONTINUED | OUTPATIENT
Start: 2021-08-11 | End: 2021-08-11 | Stop reason: HOSPADM

## 2021-08-11 RX ORDER — MUPIROCIN 20 MG/G
OINTMENT TOPICAL
Status: DISCONTINUED | OUTPATIENT
Start: 2021-08-11 | End: 2021-08-11 | Stop reason: HOSPADM

## 2021-08-11 RX ORDER — OXYCODONE HYDROCHLORIDE 5 MG/1
5 TABLET ORAL EVERY 4 HOURS PRN
Status: DISCONTINUED | OUTPATIENT
Start: 2021-08-11 | End: 2021-08-11 | Stop reason: HOSPADM

## 2021-08-11 RX ORDER — LIDOCAINE HYDROCHLORIDE 10 MG/ML
1 INJECTION, SOLUTION EPIDURAL; INFILTRATION; INTRACAUDAL; PERINEURAL ONCE
Status: DISCONTINUED | OUTPATIENT
Start: 2021-08-11 | End: 2021-08-11 | Stop reason: HOSPADM

## 2021-08-11 RX ORDER — PROPOFOL 10 MG/ML
VIAL (ML) INTRAVENOUS CONTINUOUS PRN
Status: DISCONTINUED | OUTPATIENT
Start: 2021-08-11 | End: 2021-08-11

## 2021-08-11 RX ORDER — KETAMINE HCL IN 0.9 % NACL 50 MG/5 ML
SYRINGE (ML) INTRAVENOUS
Status: DISCONTINUED | OUTPATIENT
Start: 2021-08-11 | End: 2021-08-11

## 2021-08-11 RX ADMIN — PROPOFOL 100 MCG/KG/MIN: 10 INJECTION, EMULSION INTRAVENOUS at 08:08

## 2021-08-11 RX ADMIN — MUPIROCIN: 20 OINTMENT TOPICAL at 07:08

## 2021-08-11 RX ADMIN — SODIUM CHLORIDE, SODIUM GLUCONATE, SODIUM ACETATE, POTASSIUM CHLORIDE, MAGNESIUM CHLORIDE, SODIUM PHOSPHATE, DIBASIC, AND POTASSIUM PHOSPHATE: .53; .5; .37; .037; .03; .012; .00082 INJECTION, SOLUTION INTRAVENOUS at 08:08

## 2021-08-11 RX ADMIN — Medication 10 MG: at 08:08

## 2021-08-11 RX ADMIN — MIDAZOLAM 2 MG: 1 INJECTION INTRAMUSCULAR; INTRAVENOUS at 07:08

## 2021-08-16 ENCOUNTER — OFFICE VISIT (OUTPATIENT)
Dept: HEMATOLOGY/ONCOLOGY | Facility: CLINIC | Age: 51
End: 2021-08-16
Payer: COMMERCIAL

## 2021-08-16 ENCOUNTER — PATIENT MESSAGE (OUTPATIENT)
Dept: HEMATOLOGY/ONCOLOGY | Facility: CLINIC | Age: 51
End: 2021-08-16

## 2021-08-16 DIAGNOSIS — C78.6 MALIGNANT NEOPLASM OF COLON METASTATIC TO PERITONEUM: ICD-10-CM

## 2021-08-16 DIAGNOSIS — R21 RASH: ICD-10-CM

## 2021-08-16 DIAGNOSIS — M62.838 MUSCLE SPASM: ICD-10-CM

## 2021-08-16 DIAGNOSIS — G89.3 CANCER ASSOCIATED PAIN: ICD-10-CM

## 2021-08-16 DIAGNOSIS — R11.0 NAUSEA: ICD-10-CM

## 2021-08-16 DIAGNOSIS — S30.1XXD ABDOMINAL WALL SEROMA, SUBSEQUENT ENCOUNTER: ICD-10-CM

## 2021-08-16 DIAGNOSIS — E66.01 CLASS 3 SEVERE OBESITY WITHOUT SERIOUS COMORBIDITY WITH BODY MASS INDEX (BMI) OF 40.0 TO 44.9 IN ADULT, UNSPECIFIED OBESITY TYPE: ICD-10-CM

## 2021-08-16 DIAGNOSIS — K59.00 CONSTIPATION, UNSPECIFIED CONSTIPATION TYPE: ICD-10-CM

## 2021-08-16 DIAGNOSIS — R53.1 GENERAL WEAKNESS: ICD-10-CM

## 2021-08-16 DIAGNOSIS — R45.89 ANXIETY ABOUT HEALTH: ICD-10-CM

## 2021-08-16 DIAGNOSIS — R53.83 FATIGUE, UNSPECIFIED TYPE: ICD-10-CM

## 2021-08-16 DIAGNOSIS — C18.9 MALIGNANT NEOPLASM OF COLON METASTATIC TO PERITONEUM: ICD-10-CM

## 2021-08-16 DIAGNOSIS — C18.9 ADENOCARCINOMA OF COLON: Primary | ICD-10-CM

## 2021-08-16 PROCEDURE — 3044F PR MOST RECENT HEMOGLOBIN A1C LEVEL <7.0%: ICD-10-PCS | Mod: CPTII,,, | Performed by: INTERNAL MEDICINE

## 2021-08-16 PROCEDURE — 99215 OFFICE O/P EST HI 40 MIN: CPT | Mod: 95,,, | Performed by: INTERNAL MEDICINE

## 2021-08-16 PROCEDURE — 3044F HG A1C LEVEL LT 7.0%: CPT | Mod: CPTII,,, | Performed by: INTERNAL MEDICINE

## 2021-08-16 PROCEDURE — 99215 PR OFFICE/OUTPT VISIT, EST, LEVL V, 40-54 MIN: ICD-10-PCS | Mod: 95,,, | Performed by: INTERNAL MEDICINE

## 2021-08-16 RX ORDER — SODIUM CHLORIDE 0.9 % (FLUSH) 0.9 %
10 SYRINGE (ML) INJECTION
Status: CANCELLED | OUTPATIENT
Start: 2021-08-17

## 2021-08-16 RX ORDER — SODIUM CHLORIDE 0.9 % (FLUSH) 0.9 %
10 SYRINGE (ML) INJECTION
Status: CANCELLED | OUTPATIENT
Start: 2021-08-19

## 2021-08-16 RX ORDER — ATROPINE SULFATE 0.4 MG/ML
0.2 INJECTION, SOLUTION ENDOTRACHEAL; INTRAMEDULLARY; INTRAMUSCULAR; INTRAVENOUS; SUBCUTANEOUS
Status: CANCELLED
Start: 2021-08-17

## 2021-08-16 RX ORDER — HEPARIN 100 UNIT/ML
500 SYRINGE INTRAVENOUS
Status: CANCELLED | OUTPATIENT
Start: 2021-08-17

## 2021-08-16 RX ORDER — HEPARIN 100 UNIT/ML
500 SYRINGE INTRAVENOUS
Status: CANCELLED | OUTPATIENT
Start: 2021-08-19

## 2021-08-17 ENCOUNTER — INFUSION (OUTPATIENT)
Dept: INFUSION THERAPY | Facility: HOSPITAL | Age: 51
End: 2021-08-17
Payer: COMMERCIAL

## 2021-08-17 VITALS
OXYGEN SATURATION: 98 % | SYSTOLIC BLOOD PRESSURE: 109 MMHG | HEART RATE: 71 BPM | TEMPERATURE: 98 F | RESPIRATION RATE: 18 BRPM | DIASTOLIC BLOOD PRESSURE: 69 MMHG | HEIGHT: 75 IN | BODY MASS INDEX: 37.67 KG/M2 | WEIGHT: 302.94 LBS

## 2021-08-17 DIAGNOSIS — C18.9 ADENOCARCINOMA OF COLON: Primary | ICD-10-CM

## 2021-08-17 PROCEDURE — 63600175 PHARM REV CODE 636 W HCPCS: Mod: PN | Performed by: INTERNAL MEDICINE

## 2021-08-17 PROCEDURE — 96367 TX/PROPH/DG ADDL SEQ IV INF: CPT | Mod: PN

## 2021-08-17 PROCEDURE — 96368 THER/DIAG CONCURRENT INF: CPT | Mod: PN

## 2021-08-17 PROCEDURE — 96415 CHEMO IV INFUSION ADDL HR: CPT | Mod: PN

## 2021-08-17 PROCEDURE — 96375 TX/PRO/DX INJ NEW DRUG ADDON: CPT | Mod: PN

## 2021-08-17 PROCEDURE — 96416 CHEMO PROLONG INFUSE W/PUMP: CPT | Mod: PN

## 2021-08-17 PROCEDURE — 96413 CHEMO IV INFUSION 1 HR: CPT | Mod: PN

## 2021-08-17 PROCEDURE — 25000003 PHARM REV CODE 250: Mod: PN | Performed by: INTERNAL MEDICINE

## 2021-08-17 RX ORDER — SODIUM CHLORIDE 0.9 % (FLUSH) 0.9 %
10 SYRINGE (ML) INJECTION
Status: DISCONTINUED | OUTPATIENT
Start: 2021-08-17 | End: 2021-08-17 | Stop reason: HOSPADM

## 2021-08-17 RX ORDER — HEPARIN 100 UNIT/ML
500 SYRINGE INTRAVENOUS
Status: DISCONTINUED | OUTPATIENT
Start: 2021-08-17 | End: 2021-08-17 | Stop reason: HOSPADM

## 2021-08-17 RX ORDER — ATROPINE SULFATE 0.4 MG/ML
0.2 INJECTION, SOLUTION ENDOTRACHEAL; INTRAMEDULLARY; INTRAMUSCULAR; INTRAVENOUS; SUBCUTANEOUS
Status: DISCONTINUED | OUTPATIENT
Start: 2021-08-17 | End: 2021-08-17 | Stop reason: HOSPADM

## 2021-08-17 RX ADMIN — APREPITANT 130 MG: 130 INJECTION, EMULSION INTRAVENOUS at 01:08

## 2021-08-17 RX ADMIN — SODIUM CHLORIDE 364 MG: 0.9 INJECTION, SOLUTION INTRAVENOUS at 02:08

## 2021-08-17 RX ADMIN — DEXAMETHASONE SODIUM PHOSPHATE 20 MG: 4 INJECTION INTRA-ARTICULAR; INTRALESIONAL; INTRAMUSCULAR; INTRAVENOUS; SOFT TISSUE at 02:08

## 2021-08-17 RX ADMIN — FLUOROURACIL 3240 MG: 50 INJECTION, SOLUTION INTRAVENOUS at 04:08

## 2021-08-17 RX ADMIN — DEXTROSE 1050 MG: 5 SOLUTION INTRAVENOUS at 02:08

## 2021-08-17 RX ADMIN — SODIUM CHLORIDE: 0.9 INJECTION, SOLUTION INTRAVENOUS at 01:08

## 2021-08-19 ENCOUNTER — INFUSION (OUTPATIENT)
Dept: INFUSION THERAPY | Facility: HOSPITAL | Age: 51
End: 2021-08-19
Payer: COMMERCIAL

## 2021-08-19 VITALS
DIASTOLIC BLOOD PRESSURE: 79 MMHG | HEART RATE: 70 BPM | SYSTOLIC BLOOD PRESSURE: 127 MMHG | TEMPERATURE: 98 F | BODY MASS INDEX: 37.86 KG/M2 | RESPIRATION RATE: 18 BRPM | WEIGHT: 302.94 LBS

## 2021-08-19 DIAGNOSIS — C18.9 ADENOCARCINOMA OF COLON: Primary | ICD-10-CM

## 2021-08-19 PROCEDURE — 96523 IRRIG DRUG DELIVERY DEVICE: CPT | Mod: PN

## 2021-08-19 PROCEDURE — 25000003 PHARM REV CODE 250: Mod: PN | Performed by: INTERNAL MEDICINE

## 2021-08-19 PROCEDURE — A4216 STERILE WATER/SALINE, 10 ML: HCPCS | Mod: PN | Performed by: INTERNAL MEDICINE

## 2021-08-19 PROCEDURE — 63600175 PHARM REV CODE 636 W HCPCS: Mod: PN | Performed by: INTERNAL MEDICINE

## 2021-08-19 RX ORDER — SODIUM CHLORIDE 0.9 % (FLUSH) 0.9 %
10 SYRINGE (ML) INJECTION
Status: DISCONTINUED | OUTPATIENT
Start: 2021-08-19 | End: 2021-08-19 | Stop reason: HOSPADM

## 2021-08-19 RX ORDER — HEPARIN 100 UNIT/ML
500 SYRINGE INTRAVENOUS
Status: DISCONTINUED | OUTPATIENT
Start: 2021-08-19 | End: 2021-08-19 | Stop reason: HOSPADM

## 2021-08-19 RX ADMIN — Medication 10 ML: at 04:08

## 2021-08-19 RX ADMIN — HEPARIN 500 UNITS: 100 SYRINGE at 04:08

## 2021-08-23 ENCOUNTER — TELEPHONE (OUTPATIENT)
Dept: SURGERY | Facility: CLINIC | Age: 51
End: 2021-08-23

## 2021-08-24 ENCOUNTER — OFFICE VISIT (OUTPATIENT)
Dept: HEMATOLOGY/ONCOLOGY | Facility: CLINIC | Age: 51
End: 2021-08-24
Payer: COMMERCIAL

## 2021-08-24 ENCOUNTER — PATIENT MESSAGE (OUTPATIENT)
Dept: HEMATOLOGY/ONCOLOGY | Facility: CLINIC | Age: 51
End: 2021-08-24

## 2021-08-24 DIAGNOSIS — R11.0 NAUSEA: ICD-10-CM

## 2021-08-24 DIAGNOSIS — C18.9 ADENOCARCINOMA OF COLON: Primary | ICD-10-CM

## 2021-08-24 DIAGNOSIS — R53.83 FATIGUE, UNSPECIFIED TYPE: ICD-10-CM

## 2021-08-24 DIAGNOSIS — C78.6 MALIGNANT NEOPLASM OF COLON METASTATIC TO PERITONEUM: ICD-10-CM

## 2021-08-24 DIAGNOSIS — C18.9 MALIGNANT NEOPLASM OF COLON METASTATIC TO PERITONEUM: ICD-10-CM

## 2021-08-24 DIAGNOSIS — G89.3 CANCER ASSOCIATED PAIN: ICD-10-CM

## 2021-08-24 DIAGNOSIS — R45.89 ANXIETY ABOUT HEALTH: ICD-10-CM

## 2021-08-24 DIAGNOSIS — S30.1XXD ABDOMINAL WALL SEROMA, SUBSEQUENT ENCOUNTER: ICD-10-CM

## 2021-08-24 PROCEDURE — 99215 OFFICE O/P EST HI 40 MIN: CPT | Mod: 95,,, | Performed by: INTERNAL MEDICINE

## 2021-08-24 PROCEDURE — 3044F PR MOST RECENT HEMOGLOBIN A1C LEVEL <7.0%: ICD-10-PCS | Mod: CPTII,,, | Performed by: INTERNAL MEDICINE

## 2021-08-24 PROCEDURE — 99215 PR OFFICE/OUTPT VISIT, EST, LEVL V, 40-54 MIN: ICD-10-PCS | Mod: 95,,, | Performed by: INTERNAL MEDICINE

## 2021-08-24 PROCEDURE — 3044F HG A1C LEVEL LT 7.0%: CPT | Mod: CPTII,,, | Performed by: INTERNAL MEDICINE

## 2021-08-25 ENCOUNTER — OFFICE VISIT (OUTPATIENT)
Dept: INFECTIOUS DISEASES | Facility: CLINIC | Age: 51
End: 2021-08-25
Payer: COMMERCIAL

## 2021-08-25 ENCOUNTER — PATIENT MESSAGE (OUTPATIENT)
Dept: INFECTIOUS DISEASES | Facility: CLINIC | Age: 51
End: 2021-08-25

## 2021-08-25 DIAGNOSIS — B35.4 TINEA CORPORIS: ICD-10-CM

## 2021-08-25 DIAGNOSIS — B37.0 ORAL THRUSH: ICD-10-CM

## 2021-08-25 DIAGNOSIS — L02.211 ABDOMINAL WALL ABSCESS: Primary | ICD-10-CM

## 2021-08-25 PROCEDURE — 3044F PR MOST RECENT HEMOGLOBIN A1C LEVEL <7.0%: ICD-10-PCS | Mod: CPTII,,, | Performed by: INTERNAL MEDICINE

## 2021-08-25 PROCEDURE — 1160F RVW MEDS BY RX/DR IN RCRD: CPT | Mod: CPTII,,, | Performed by: INTERNAL MEDICINE

## 2021-08-25 PROCEDURE — 1159F MED LIST DOCD IN RCRD: CPT | Mod: CPTII,,, | Performed by: INTERNAL MEDICINE

## 2021-08-25 PROCEDURE — 1160F PR REVIEW ALL MEDS BY PRESCRIBER/CLIN PHARMACIST DOCUMENTED: ICD-10-PCS | Mod: CPTII,,, | Performed by: INTERNAL MEDICINE

## 2021-08-25 PROCEDURE — 3044F HG A1C LEVEL LT 7.0%: CPT | Mod: CPTII,,, | Performed by: INTERNAL MEDICINE

## 2021-08-25 PROCEDURE — 99214 OFFICE O/P EST MOD 30 MIN: CPT | Mod: 95,,, | Performed by: INTERNAL MEDICINE

## 2021-08-25 PROCEDURE — 99214 PR OFFICE/OUTPT VISIT, EST, LEVL IV, 30-39 MIN: ICD-10-PCS | Mod: 95,,, | Performed by: INTERNAL MEDICINE

## 2021-08-25 PROCEDURE — 1159F PR MEDICATION LIST DOCUMENTED IN MEDICAL RECORD: ICD-10-PCS | Mod: CPTII,,, | Performed by: INTERNAL MEDICINE

## 2021-08-25 RX ORDER — FLUCONAZOLE 200 MG/1
200 TABLET ORAL DAILY
Qty: 14 TABLET | Refills: 0 | Status: SHIPPED | OUTPATIENT
Start: 2021-08-25 | End: 2021-09-08

## 2021-08-25 RX ORDER — NYSTATIN 100000 [USP'U]/ML
6 SUSPENSION ORAL 4 TIMES DAILY
Qty: 336 ML | Refills: 0 | Status: SHIPPED | OUTPATIENT
Start: 2021-08-25 | End: 2021-09-08

## 2021-08-25 RX ORDER — CLOTRIMAZOLE 1 %
CREAM (GRAM) TOPICAL 2 TIMES DAILY
Qty: 1 TUBE | Refills: 2 | Status: SHIPPED | OUTPATIENT
Start: 2021-08-25 | End: 2022-09-15 | Stop reason: ALTCHOICE

## 2021-08-25 RX ORDER — LINEZOLID 600 MG/1
600 TABLET, FILM COATED ORAL 2 TIMES DAILY
Qty: 60 TABLET | Refills: 0 | Status: SHIPPED | OUTPATIENT
Start: 2021-08-25 | End: 2021-09-09 | Stop reason: SDUPTHER

## 2021-08-30 ENCOUNTER — PATIENT MESSAGE (OUTPATIENT)
Dept: INFUSION THERAPY | Facility: HOSPITAL | Age: 51
End: 2021-08-30

## 2021-09-01 ENCOUNTER — PATIENT MESSAGE (OUTPATIENT)
Dept: HEMATOLOGY/ONCOLOGY | Facility: CLINIC | Age: 51
End: 2021-09-01

## 2021-09-02 ENCOUNTER — TELEPHONE (OUTPATIENT)
Dept: HEMATOLOGY/ONCOLOGY | Facility: CLINIC | Age: 51
End: 2021-09-02

## 2021-09-02 ENCOUNTER — TELEPHONE (OUTPATIENT)
Dept: INFECTIOUS DISEASES | Facility: CLINIC | Age: 51
End: 2021-09-02

## 2021-09-03 DIAGNOSIS — G89.3 CANCER ASSOCIATED PAIN: ICD-10-CM

## 2021-09-03 DIAGNOSIS — F41.9 ANXIETY: ICD-10-CM

## 2021-09-03 RX ORDER — ALPRAZOLAM 0.5 MG/1
0.5 TABLET ORAL DAILY
Qty: 60 TABLET | Refills: 0 | Status: SHIPPED | OUTPATIENT
Start: 2021-09-03

## 2021-09-03 RX ORDER — MORPHINE SULFATE 15 MG/1
15 TABLET, FILM COATED, EXTENDED RELEASE ORAL 2 TIMES DAILY
Qty: 60 TABLET | Refills: 0 | Status: SHIPPED | OUTPATIENT
Start: 2021-09-03

## 2021-09-07 ENCOUNTER — TELEPHONE (OUTPATIENT)
Dept: HEMATOLOGY/ONCOLOGY | Facility: CLINIC | Age: 51
End: 2021-09-07

## 2021-09-08 ENCOUNTER — OFFICE VISIT (OUTPATIENT)
Dept: INFECTIOUS DISEASES | Facility: CLINIC | Age: 51
End: 2021-09-08
Payer: COMMERCIAL

## 2021-09-08 ENCOUNTER — LAB VISIT (OUTPATIENT)
Dept: LAB | Facility: HOSPITAL | Age: 51
End: 2021-09-08
Attending: INTERNAL MEDICINE
Payer: COMMERCIAL

## 2021-09-08 ENCOUNTER — OFFICE VISIT (OUTPATIENT)
Dept: HEMATOLOGY/ONCOLOGY | Facility: CLINIC | Age: 51
End: 2021-09-08
Payer: COMMERCIAL

## 2021-09-08 ENCOUNTER — DOCUMENTATION ONLY (OUTPATIENT)
Dept: INFUSION THERAPY | Facility: HOSPITAL | Age: 51
End: 2021-09-08

## 2021-09-08 ENCOUNTER — TELEPHONE (OUTPATIENT)
Dept: INFUSION THERAPY | Facility: HOSPITAL | Age: 51
End: 2021-09-08

## 2021-09-08 ENCOUNTER — INFUSION (OUTPATIENT)
Dept: INFUSION THERAPY | Facility: HOSPITAL | Age: 51
End: 2021-09-08
Payer: COMMERCIAL

## 2021-09-08 ENCOUNTER — TELEPHONE (OUTPATIENT)
Dept: SURGERY | Facility: CLINIC | Age: 51
End: 2021-09-08

## 2021-09-08 VITALS
BODY MASS INDEX: 36.7 KG/M2 | HEIGHT: 75 IN | SYSTOLIC BLOOD PRESSURE: 120 MMHG | WEIGHT: 295.19 LBS | DIASTOLIC BLOOD PRESSURE: 84 MMHG | OXYGEN SATURATION: 99 % | TEMPERATURE: 97 F | HEART RATE: 66 BPM

## 2021-09-08 VITALS
BODY MASS INDEX: 36.7 KG/M2 | SYSTOLIC BLOOD PRESSURE: 141 MMHG | HEART RATE: 62 BPM | HEIGHT: 75 IN | RESPIRATION RATE: 18 BRPM | DIASTOLIC BLOOD PRESSURE: 84 MMHG | TEMPERATURE: 97 F | WEIGHT: 295.19 LBS

## 2021-09-08 DIAGNOSIS — Z79.2 ENCOUNTER FOR LONG-TERM (CURRENT) USE OF ANTIBIOTICS: ICD-10-CM

## 2021-09-08 DIAGNOSIS — L02.211 ABDOMINAL WALL ABSCESS: Primary | ICD-10-CM

## 2021-09-08 DIAGNOSIS — C18.9 ADENOCARCINOMA OF COLON: ICD-10-CM

## 2021-09-08 DIAGNOSIS — L02.211 ABDOMINAL WALL ABSCESS: ICD-10-CM

## 2021-09-08 DIAGNOSIS — C18.9 MALIGNANT NEOPLASM OF COLON METASTATIC TO PERITONEUM: ICD-10-CM

## 2021-09-08 DIAGNOSIS — G89.3 CANCER ASSOCIATED PAIN: ICD-10-CM

## 2021-09-08 DIAGNOSIS — C18.9 ADENOCARCINOMA OF COLON: Primary | ICD-10-CM

## 2021-09-08 DIAGNOSIS — C78.6 MALIGNANT NEOPLASM OF COLON METASTATIC TO PERITONEUM: ICD-10-CM

## 2021-09-08 DIAGNOSIS — S30.1XXD ABDOMINAL WALL SEROMA, SUBSEQUENT ENCOUNTER: ICD-10-CM

## 2021-09-08 LAB
ALBUMIN SERPL BCP-MCNC: 3.7 G/DL (ref 3.5–5.2)
ALP SERPL-CCNC: 103 U/L (ref 55–135)
ALT SERPL W/O P-5'-P-CCNC: 16 U/L (ref 10–44)
ANION GAP SERPL CALC-SCNC: 10 MMOL/L (ref 8–16)
AST SERPL-CCNC: 16 U/L (ref 10–40)
BASOPHILS # BLD AUTO: 0.02 K/UL (ref 0–0.2)
BASOPHILS NFR BLD: 0.4 % (ref 0–1.9)
BILIRUB SERPL-MCNC: 0.4 MG/DL (ref 0.1–1)
BUN SERPL-MCNC: 14 MG/DL (ref 6–20)
CALCIUM SERPL-MCNC: 9.8 MG/DL (ref 8.7–10.5)
CHLORIDE SERPL-SCNC: 103 MMOL/L (ref 95–110)
CO2 SERPL-SCNC: 26 MMOL/L (ref 23–29)
CREAT SERPL-MCNC: 1 MG/DL (ref 0.5–1.4)
DIFFERENTIAL METHOD: ABNORMAL
EOSINOPHIL # BLD AUTO: 0.1 K/UL (ref 0–0.5)
EOSINOPHIL NFR BLD: 3.1 % (ref 0–8)
ERYTHROCYTE [DISTWIDTH] IN BLOOD BY AUTOMATED COUNT: 21 % (ref 11.5–14.5)
EST. GFR  (AFRICAN AMERICAN): >60 ML/MIN/1.73 M^2
EST. GFR  (NON AFRICAN AMERICAN): >60 ML/MIN/1.73 M^2
GLUCOSE SERPL-MCNC: 96 MG/DL (ref 70–110)
HCT VFR BLD AUTO: 49.3 % (ref 40–54)
HGB BLD-MCNC: 15.9 G/DL (ref 14–18)
IMM GRANULOCYTES # BLD AUTO: 0.02 K/UL (ref 0–0.04)
IMM GRANULOCYTES NFR BLD AUTO: 0.4 % (ref 0–0.5)
LYMPHOCYTES # BLD AUTO: 0.8 K/UL (ref 1–4.8)
LYMPHOCYTES NFR BLD: 16.7 % (ref 18–48)
MAGNESIUM SERPL-MCNC: 2 MG/DL (ref 1.6–2.6)
MCH RBC QN AUTO: 29.4 PG (ref 27–31)
MCHC RBC AUTO-ENTMCNC: 32.3 G/DL (ref 32–36)
MCV RBC AUTO: 91 FL (ref 82–98)
MONOCYTES # BLD AUTO: 0.7 K/UL (ref 0.3–1)
MONOCYTES NFR BLD: 15.1 % (ref 4–15)
NEUTROPHILS # BLD AUTO: 2.9 K/UL (ref 1.8–7.7)
NEUTROPHILS NFR BLD: 64.3 % (ref 38–73)
NRBC BLD-RTO: 0 /100 WBC
PHOSPHATE SERPL-MCNC: 3.4 MG/DL (ref 2.7–4.5)
PLATELET # BLD AUTO: 187 K/UL (ref 150–450)
PMV BLD AUTO: 9.2 FL (ref 9.2–12.9)
POTASSIUM SERPL-SCNC: 4.6 MMOL/L (ref 3.5–5.1)
PROT SERPL-MCNC: 7.1 G/DL (ref 6–8.4)
RBC # BLD AUTO: 5.4 M/UL (ref 4.6–6.2)
SODIUM SERPL-SCNC: 139 MMOL/L (ref 136–145)
WBC # BLD AUTO: 4.49 K/UL (ref 3.9–12.7)

## 2021-09-08 PROCEDURE — 83735 ASSAY OF MAGNESIUM: CPT | Mod: PN | Performed by: INTERNAL MEDICINE

## 2021-09-08 PROCEDURE — A4216 STERILE WATER/SALINE, 10 ML: HCPCS | Mod: PN | Performed by: INTERNAL MEDICINE

## 2021-09-08 PROCEDURE — 3074F PR MOST RECENT SYSTOLIC BLOOD PRESSURE < 130 MM HG: ICD-10-PCS | Mod: CPTII,S$GLB,, | Performed by: INTERNAL MEDICINE

## 2021-09-08 PROCEDURE — 85025 COMPLETE CBC W/AUTO DIFF WBC: CPT | Mod: PN | Performed by: INTERNAL MEDICINE

## 2021-09-08 PROCEDURE — 1159F PR MEDICATION LIST DOCUMENTED IN MEDICAL RECORD: ICD-10-PCS | Mod: CPTII,S$GLB,, | Performed by: INTERNAL MEDICINE

## 2021-09-08 PROCEDURE — 3008F BODY MASS INDEX DOCD: CPT | Mod: CPTII,S$GLB,, | Performed by: INTERNAL MEDICINE

## 2021-09-08 PROCEDURE — 96368 THER/DIAG CONCURRENT INF: CPT | Mod: PN

## 2021-09-08 PROCEDURE — 3074F SYST BP LT 130 MM HG: CPT | Mod: CPTII,S$GLB,, | Performed by: INTERNAL MEDICINE

## 2021-09-08 PROCEDURE — 99999 PR PBB SHADOW E&M-EST. PATIENT-LVL II: CPT | Mod: PBBFAC,,, | Performed by: INTERNAL MEDICINE

## 2021-09-08 PROCEDURE — 96415 CHEMO IV INFUSION ADDL HR: CPT | Mod: PN

## 2021-09-08 PROCEDURE — 84100 ASSAY OF PHOSPHORUS: CPT | Mod: PN | Performed by: INTERNAL MEDICINE

## 2021-09-08 PROCEDURE — 1160F RVW MEDS BY RX/DR IN RCRD: CPT | Mod: CPTII,S$GLB,, | Performed by: INTERNAL MEDICINE

## 2021-09-08 PROCEDURE — 99214 PR OFFICE/OUTPT VISIT, EST, LEVL IV, 30-39 MIN: ICD-10-PCS | Mod: S$GLB,,, | Performed by: INTERNAL MEDICINE

## 2021-09-08 PROCEDURE — 99999 PR PBB SHADOW E&M-EST. PATIENT-LVL IV: ICD-10-PCS | Mod: PBBFAC,,, | Performed by: INTERNAL MEDICINE

## 2021-09-08 PROCEDURE — 3044F PR MOST RECENT HEMOGLOBIN A1C LEVEL <7.0%: ICD-10-PCS | Mod: CPTII,S$GLB,, | Performed by: INTERNAL MEDICINE

## 2021-09-08 PROCEDURE — 3044F HG A1C LEVEL LT 7.0%: CPT | Mod: CPTII,S$GLB,, | Performed by: INTERNAL MEDICINE

## 2021-09-08 PROCEDURE — 96416 CHEMO PROLONG INFUSE W/PUMP: CPT | Mod: PN

## 2021-09-08 PROCEDURE — 25000003 PHARM REV CODE 250: Mod: PN | Performed by: INTERNAL MEDICINE

## 2021-09-08 PROCEDURE — 36415 COLL VENOUS BLD VENIPUNCTURE: CPT | Mod: PN | Performed by: INTERNAL MEDICINE

## 2021-09-08 PROCEDURE — 99999 PR PBB SHADOW E&M-EST. PATIENT-LVL II: ICD-10-PCS | Mod: PBBFAC,,, | Performed by: INTERNAL MEDICINE

## 2021-09-08 PROCEDURE — 96367 TX/PROPH/DG ADDL SEQ IV INF: CPT | Mod: PN

## 2021-09-08 PROCEDURE — 99999 PR PBB SHADOW E&M-EST. PATIENT-LVL IV: CPT | Mod: PBBFAC,,, | Performed by: INTERNAL MEDICINE

## 2021-09-08 PROCEDURE — 99214 OFFICE O/P EST MOD 30 MIN: CPT | Mod: S$GLB,,, | Performed by: INTERNAL MEDICINE

## 2021-09-08 PROCEDURE — 3079F DIAST BP 80-89 MM HG: CPT | Mod: CPTII,S$GLB,, | Performed by: INTERNAL MEDICINE

## 2021-09-08 PROCEDURE — 3008F PR BODY MASS INDEX (BMI) DOCUMENTED: ICD-10-PCS | Mod: CPTII,S$GLB,, | Performed by: INTERNAL MEDICINE

## 2021-09-08 PROCEDURE — 1160F PR REVIEW ALL MEDS BY PRESCRIBER/CLIN PHARMACIST DOCUMENTED: ICD-10-PCS | Mod: CPTII,S$GLB,, | Performed by: INTERNAL MEDICINE

## 2021-09-08 PROCEDURE — 96413 CHEMO IV INFUSION 1 HR: CPT | Mod: PN

## 2021-09-08 PROCEDURE — 3079F PR MOST RECENT DIASTOLIC BLOOD PRESSURE 80-89 MM HG: ICD-10-PCS | Mod: CPTII,S$GLB,, | Performed by: INTERNAL MEDICINE

## 2021-09-08 PROCEDURE — 63600175 PHARM REV CODE 636 W HCPCS: Mod: PN | Performed by: INTERNAL MEDICINE

## 2021-09-08 PROCEDURE — 1159F MED LIST DOCD IN RCRD: CPT | Mod: CPTII,S$GLB,, | Performed by: INTERNAL MEDICINE

## 2021-09-08 PROCEDURE — 80053 COMPREHEN METABOLIC PANEL: CPT | Mod: PN | Performed by: INTERNAL MEDICINE

## 2021-09-08 RX ORDER — ATROPINE SULFATE 0.4 MG/ML
0.2 INJECTION, SOLUTION ENDOTRACHEAL; INTRAMEDULLARY; INTRAMUSCULAR; INTRAVENOUS; SUBCUTANEOUS
Status: CANCELLED
Start: 2021-09-08

## 2021-09-08 RX ORDER — SODIUM CHLORIDE 0.9 % (FLUSH) 0.9 %
10 SYRINGE (ML) INJECTION
Status: CANCELLED | OUTPATIENT
Start: 2021-09-10

## 2021-09-08 RX ORDER — SODIUM CHLORIDE 0.9 % (FLUSH) 0.9 %
10 SYRINGE (ML) INJECTION
Status: DISCONTINUED | OUTPATIENT
Start: 2021-09-08 | End: 2021-09-08 | Stop reason: HOSPADM

## 2021-09-08 RX ORDER — HEPARIN 100 UNIT/ML
500 SYRINGE INTRAVENOUS
Status: CANCELLED | OUTPATIENT
Start: 2021-09-08

## 2021-09-08 RX ORDER — HEPARIN 100 UNIT/ML
500 SYRINGE INTRAVENOUS
Status: CANCELLED | OUTPATIENT
Start: 2021-09-10

## 2021-09-08 RX ORDER — HEPARIN 100 UNIT/ML
500 SYRINGE INTRAVENOUS
Status: DISCONTINUED | OUTPATIENT
Start: 2021-09-08 | End: 2021-09-08 | Stop reason: HOSPADM

## 2021-09-08 RX ORDER — SODIUM CHLORIDE 0.9 % (FLUSH) 0.9 %
10 SYRINGE (ML) INJECTION
Status: CANCELLED | OUTPATIENT
Start: 2021-09-08

## 2021-09-08 RX ORDER — ATROPINE SULFATE 0.4 MG/ML
0.2 INJECTION, SOLUTION ENDOTRACHEAL; INTRAMEDULLARY; INTRAMUSCULAR; INTRAVENOUS; SUBCUTANEOUS
Status: COMPLETED | OUTPATIENT
Start: 2021-09-08 | End: 2021-09-08

## 2021-09-08 RX ADMIN — Medication 10 ML: at 10:09

## 2021-09-08 RX ADMIN — DEXAMETHASONE SODIUM PHOSPHATE 20 MG: 4 INJECTION INTRA-ARTICULAR; INTRALESIONAL; INTRAMUSCULAR; INTRAVENOUS; SOFT TISSUE at 10:09

## 2021-09-08 RX ADMIN — APREPITANT 130 MG: 130 INJECTION, EMULSION INTRAVENOUS at 10:09

## 2021-09-08 RX ADMIN — ATROPINE SULFATE 0.2 MG: 0.4 INJECTION, SOLUTION INTRAMUSCULAR; INTRAVENOUS; SUBCUTANEOUS at 10:09

## 2021-09-08 RX ADMIN — FLUOROURACIL 3240 MG: 50 INJECTION, SOLUTION INTRAVENOUS at 12:09

## 2021-09-08 RX ADMIN — SODIUM CHLORIDE: 0.9 INJECTION, SOLUTION INTRAVENOUS at 10:09

## 2021-09-08 RX ADMIN — SODIUM CHLORIDE 360 MG: 0.9 INJECTION, SOLUTION INTRAVENOUS at 10:09

## 2021-09-08 RX ADMIN — LEUCOVORIN CALCIUM 1065 MG: 350 INJECTION, POWDER, LYOPHILIZED, FOR SUSPENSION INTRAMUSCULAR; INTRAVENOUS at 10:09

## 2021-09-09 ENCOUNTER — OFFICE VISIT (OUTPATIENT)
Dept: SURGERY | Facility: CLINIC | Age: 51
End: 2021-09-09
Payer: COMMERCIAL

## 2021-09-09 ENCOUNTER — PATIENT MESSAGE (OUTPATIENT)
Dept: HEMATOLOGY/ONCOLOGY | Facility: CLINIC | Age: 51
End: 2021-09-09

## 2021-09-09 ENCOUNTER — DOCUMENTATION ONLY (OUTPATIENT)
Dept: INFUSION THERAPY | Facility: HOSPITAL | Age: 51
End: 2021-09-09

## 2021-09-09 VITALS
HEART RATE: 72 BPM | HEIGHT: 75 IN | BODY MASS INDEX: 36.4 KG/M2 | DIASTOLIC BLOOD PRESSURE: 72 MMHG | WEIGHT: 292.75 LBS | SYSTOLIC BLOOD PRESSURE: 119 MMHG

## 2021-09-09 DIAGNOSIS — G89.3 CANCER ASSOCIATED PAIN: ICD-10-CM

## 2021-09-09 DIAGNOSIS — S30.1XXD ABDOMINAL WALL SEROMA, SUBSEQUENT ENCOUNTER: Primary | ICD-10-CM

## 2021-09-09 PROCEDURE — 1160F RVW MEDS BY RX/DR IN RCRD: CPT | Mod: CPTII,S$GLB,, | Performed by: COLON & RECTAL SURGERY

## 2021-09-09 PROCEDURE — 1160F PR REVIEW ALL MEDS BY PRESCRIBER/CLIN PHARMACIST DOCUMENTED: ICD-10-PCS | Mod: CPTII,S$GLB,, | Performed by: COLON & RECTAL SURGERY

## 2021-09-09 PROCEDURE — 3044F PR MOST RECENT HEMOGLOBIN A1C LEVEL <7.0%: ICD-10-PCS | Mod: CPTII,S$GLB,, | Performed by: COLON & RECTAL SURGERY

## 2021-09-09 PROCEDURE — 99999 PR PBB SHADOW E&M-EST. PATIENT-LVL IV: CPT | Mod: PBBFAC,,, | Performed by: COLON & RECTAL SURGERY

## 2021-09-09 PROCEDURE — 99024 POSTOP FOLLOW-UP VISIT: CPT | Mod: S$GLB,,, | Performed by: COLON & RECTAL SURGERY

## 2021-09-09 PROCEDURE — 3074F SYST BP LT 130 MM HG: CPT | Mod: CPTII,S$GLB,, | Performed by: COLON & RECTAL SURGERY

## 2021-09-09 PROCEDURE — 3044F HG A1C LEVEL LT 7.0%: CPT | Mod: CPTII,S$GLB,, | Performed by: COLON & RECTAL SURGERY

## 2021-09-09 PROCEDURE — 3008F PR BODY MASS INDEX (BMI) DOCUMENTED: ICD-10-PCS | Mod: CPTII,S$GLB,, | Performed by: COLON & RECTAL SURGERY

## 2021-09-09 PROCEDURE — 1159F PR MEDICATION LIST DOCUMENTED IN MEDICAL RECORD: ICD-10-PCS | Mod: CPTII,S$GLB,, | Performed by: COLON & RECTAL SURGERY

## 2021-09-09 PROCEDURE — 1159F MED LIST DOCD IN RCRD: CPT | Mod: CPTII,S$GLB,, | Performed by: COLON & RECTAL SURGERY

## 2021-09-09 PROCEDURE — 3078F DIAST BP <80 MM HG: CPT | Mod: CPTII,S$GLB,, | Performed by: COLON & RECTAL SURGERY

## 2021-09-09 PROCEDURE — 3008F BODY MASS INDEX DOCD: CPT | Mod: CPTII,S$GLB,, | Performed by: COLON & RECTAL SURGERY

## 2021-09-09 PROCEDURE — 3074F PR MOST RECENT SYSTOLIC BLOOD PRESSURE < 130 MM HG: ICD-10-PCS | Mod: CPTII,S$GLB,, | Performed by: COLON & RECTAL SURGERY

## 2021-09-09 PROCEDURE — 3078F PR MOST RECENT DIASTOLIC BLOOD PRESSURE < 80 MM HG: ICD-10-PCS | Mod: CPTII,S$GLB,, | Performed by: COLON & RECTAL SURGERY

## 2021-09-09 PROCEDURE — 99024 PR POST-OP FOLLOW-UP VISIT: ICD-10-PCS | Mod: S$GLB,,, | Performed by: COLON & RECTAL SURGERY

## 2021-09-09 PROCEDURE — 99999 PR PBB SHADOW E&M-EST. PATIENT-LVL IV: ICD-10-PCS | Mod: PBBFAC,,, | Performed by: COLON & RECTAL SURGERY

## 2021-09-09 RX ORDER — LINEZOLID 600 MG/1
600 TABLET, FILM COATED ORAL 2 TIMES DAILY
Qty: 60 TABLET | Refills: 1 | Status: SHIPPED | OUTPATIENT
Start: 2021-09-09 | End: 2021-10-09

## 2021-09-09 RX ORDER — OXYCODONE AND ACETAMINOPHEN 10; 325 MG/1; MG/1
1 TABLET ORAL 3 TIMES DAILY PRN
Qty: 90 TABLET | Refills: 0 | Status: ON HOLD | OUTPATIENT
Start: 2021-09-09 | End: 2021-10-27 | Stop reason: HOSPADM

## 2021-09-10 ENCOUNTER — INFUSION (OUTPATIENT)
Dept: INFUSION THERAPY | Facility: HOSPITAL | Age: 51
End: 2021-09-10
Payer: COMMERCIAL

## 2021-09-10 ENCOUNTER — PATIENT MESSAGE (OUTPATIENT)
Dept: HEMATOLOGY/ONCOLOGY | Facility: CLINIC | Age: 51
End: 2021-09-10

## 2021-09-10 VITALS
DIASTOLIC BLOOD PRESSURE: 84 MMHG | RESPIRATION RATE: 18 BRPM | HEART RATE: 60 BPM | SYSTOLIC BLOOD PRESSURE: 127 MMHG | TEMPERATURE: 98 F

## 2021-09-10 DIAGNOSIS — C18.9 ADENOCARCINOMA OF COLON: Primary | ICD-10-CM

## 2021-09-10 PROCEDURE — 63600175 PHARM REV CODE 636 W HCPCS: Mod: PN | Performed by: INTERNAL MEDICINE

## 2021-09-10 PROCEDURE — A4216 STERILE WATER/SALINE, 10 ML: HCPCS | Mod: PN | Performed by: INTERNAL MEDICINE

## 2021-09-10 PROCEDURE — 96523 IRRIG DRUG DELIVERY DEVICE: CPT | Mod: PN

## 2021-09-10 PROCEDURE — 25000003 PHARM REV CODE 250: Mod: PN | Performed by: INTERNAL MEDICINE

## 2021-09-10 RX ORDER — SODIUM CHLORIDE 0.9 % (FLUSH) 0.9 %
10 SYRINGE (ML) INJECTION
Status: DISCONTINUED | OUTPATIENT
Start: 2021-09-10 | End: 2021-09-10 | Stop reason: HOSPADM

## 2021-09-10 RX ORDER — HEPARIN 100 UNIT/ML
500 SYRINGE INTRAVENOUS
Status: DISCONTINUED | OUTPATIENT
Start: 2021-09-10 | End: 2021-09-10 | Stop reason: HOSPADM

## 2021-09-10 RX ADMIN — Medication 10 ML: at 11:09

## 2021-09-10 RX ADMIN — HEPARIN 500 UNITS: 100 SYRINGE at 11:09

## 2021-09-20 ENCOUNTER — TELEPHONE (OUTPATIENT)
Dept: HEMATOLOGY/ONCOLOGY | Facility: CLINIC | Age: 51
End: 2021-09-20

## 2021-09-21 ENCOUNTER — INFUSION (OUTPATIENT)
Dept: INFUSION THERAPY | Facility: HOSPITAL | Age: 51
End: 2021-09-21
Payer: COMMERCIAL

## 2021-09-21 ENCOUNTER — LAB VISIT (OUTPATIENT)
Dept: LAB | Facility: HOSPITAL | Age: 51
End: 2021-09-21
Attending: INTERNAL MEDICINE
Payer: COMMERCIAL

## 2021-09-21 VITALS
TEMPERATURE: 98 F | BODY MASS INDEX: 36.57 KG/M2 | HEART RATE: 73 BPM | RESPIRATION RATE: 18 BRPM | SYSTOLIC BLOOD PRESSURE: 133 MMHG | WEIGHT: 294.13 LBS | HEIGHT: 75 IN | DIASTOLIC BLOOD PRESSURE: 84 MMHG

## 2021-09-21 DIAGNOSIS — L02.211 ABDOMINAL WALL ABSCESS: Primary | ICD-10-CM

## 2021-09-21 DIAGNOSIS — C18.9 ADENOCARCINOMA OF COLON: ICD-10-CM

## 2021-09-21 DIAGNOSIS — C18.9 ADENOCARCINOMA OF COLON: Primary | ICD-10-CM

## 2021-09-21 DIAGNOSIS — C18.7 MALIGNANT NEOPLASM OF SIGMOID COLON: ICD-10-CM

## 2021-09-21 LAB
ALBUMIN SERPL BCP-MCNC: 3.6 G/DL (ref 3.5–5.2)
ALP SERPL-CCNC: 91 U/L (ref 55–135)
ALT SERPL W/O P-5'-P-CCNC: 26 U/L (ref 10–44)
ANION GAP SERPL CALC-SCNC: 9 MMOL/L (ref 8–16)
AST SERPL-CCNC: 18 U/L (ref 10–40)
BASOPHILS # BLD AUTO: 0.03 K/UL (ref 0–0.2)
BASOPHILS NFR BLD: 0.5 % (ref 0–1.9)
BILIRUB SERPL-MCNC: 0.4 MG/DL (ref 0.1–1)
BUN SERPL-MCNC: 13 MG/DL (ref 6–20)
CALCIUM SERPL-MCNC: 9.6 MG/DL (ref 8.7–10.5)
CEA SERPL-MCNC: <1.7 NG/ML (ref 0–5)
CHLORIDE SERPL-SCNC: 103 MMOL/L (ref 95–110)
CO2 SERPL-SCNC: 25 MMOL/L (ref 23–29)
CREAT SERPL-MCNC: 1 MG/DL (ref 0.5–1.4)
DIFFERENTIAL METHOD: ABNORMAL
EOSINOPHIL # BLD AUTO: 0.1 K/UL (ref 0–0.5)
EOSINOPHIL NFR BLD: 1.9 % (ref 0–8)
ERYTHROCYTE [DISTWIDTH] IN BLOOD BY AUTOMATED COUNT: 18.6 % (ref 11.5–14.5)
EST. GFR  (AFRICAN AMERICAN): >60 ML/MIN/1.73 M^2
EST. GFR  (NON AFRICAN AMERICAN): >60 ML/MIN/1.73 M^2
GLUCOSE SERPL-MCNC: 93 MG/DL (ref 70–110)
HCT VFR BLD AUTO: 46.7 % (ref 40–54)
HGB BLD-MCNC: 15.3 G/DL (ref 14–18)
IMM GRANULOCYTES # BLD AUTO: 0.01 K/UL (ref 0–0.04)
IMM GRANULOCYTES NFR BLD AUTO: 0.2 % (ref 0–0.5)
LYMPHOCYTES # BLD AUTO: 0.6 K/UL (ref 1–4.8)
LYMPHOCYTES NFR BLD: 8.9 % (ref 18–48)
MCH RBC QN AUTO: 29.5 PG (ref 27–31)
MCHC RBC AUTO-ENTMCNC: 32.8 G/DL (ref 32–36)
MCV RBC AUTO: 90 FL (ref 82–98)
MONOCYTES # BLD AUTO: 0.6 K/UL (ref 0.3–1)
MONOCYTES NFR BLD: 9 % (ref 4–15)
NEUTROPHILS # BLD AUTO: 5 K/UL (ref 1.8–7.7)
NEUTROPHILS NFR BLD: 79.5 % (ref 38–73)
NRBC BLD-RTO: 0 /100 WBC
PLATELET # BLD AUTO: 177 K/UL (ref 150–450)
PMV BLD AUTO: 8.8 FL (ref 9.2–12.9)
POTASSIUM SERPL-SCNC: 4.8 MMOL/L (ref 3.5–5.1)
PROT SERPL-MCNC: 6.7 G/DL (ref 6–8.4)
RBC # BLD AUTO: 5.18 M/UL (ref 4.6–6.2)
SODIUM SERPL-SCNC: 137 MMOL/L (ref 136–145)
WBC # BLD AUTO: 6.31 K/UL (ref 3.9–12.7)

## 2021-09-21 PROCEDURE — 36415 COLL VENOUS BLD VENIPUNCTURE: CPT | Mod: PN | Performed by: NURSE PRACTITIONER

## 2021-09-21 PROCEDURE — 63600175 PHARM REV CODE 636 W HCPCS: Mod: PN | Performed by: HOSPITALIST

## 2021-09-21 PROCEDURE — 96415 CHEMO IV INFUSION ADDL HR: CPT | Mod: PN

## 2021-09-21 PROCEDURE — 80053 COMPREHEN METABOLIC PANEL: CPT | Mod: PN | Performed by: NURSE PRACTITIONER

## 2021-09-21 PROCEDURE — 25000003 PHARM REV CODE 250: Mod: PN | Performed by: HOSPITALIST

## 2021-09-21 PROCEDURE — 96368 THER/DIAG CONCURRENT INF: CPT | Mod: PN

## 2021-09-21 PROCEDURE — 96413 CHEMO IV INFUSION 1 HR: CPT | Mod: PN

## 2021-09-21 PROCEDURE — 96375 TX/PRO/DX INJ NEW DRUG ADDON: CPT | Mod: PN

## 2021-09-21 PROCEDURE — 96416 CHEMO PROLONG INFUSE W/PUMP: CPT | Mod: PN

## 2021-09-21 PROCEDURE — 85025 COMPLETE CBC W/AUTO DIFF WBC: CPT | Mod: PN | Performed by: NURSE PRACTITIONER

## 2021-09-21 PROCEDURE — 96367 TX/PROPH/DG ADDL SEQ IV INF: CPT | Mod: PN

## 2021-09-21 PROCEDURE — 82378 CARCINOEMBRYONIC ANTIGEN: CPT | Performed by: NURSE PRACTITIONER

## 2021-09-21 RX ORDER — ATROPINE SULFATE 0.4 MG/ML
0.2 INJECTION, SOLUTION ENDOTRACHEAL; INTRAMEDULLARY; INTRAMUSCULAR; INTRAVENOUS; SUBCUTANEOUS
Status: COMPLETED | OUTPATIENT
Start: 2021-09-21 | End: 2021-09-21

## 2021-09-21 RX ORDER — HEPARIN 100 UNIT/ML
500 SYRINGE INTRAVENOUS
Status: DISCONTINUED | OUTPATIENT
Start: 2021-09-21 | End: 2021-09-21 | Stop reason: HOSPADM

## 2021-09-21 RX ORDER — SODIUM CHLORIDE 0.9 % (FLUSH) 0.9 %
10 SYRINGE (ML) INJECTION
Status: DISCONTINUED | OUTPATIENT
Start: 2021-09-21 | End: 2021-09-21 | Stop reason: HOSPADM

## 2021-09-21 RX ADMIN — FLUOROURACIL 3190 MG: 50 INJECTION, SOLUTION INTRAVENOUS at 03:09

## 2021-09-21 RX ADMIN — LEUCOVORIN CALCIUM 1065 MG: 350 INJECTION, POWDER, LYOPHILIZED, FOR SOLUTION INTRAMUSCULAR; INTRAVENOUS at 01:09

## 2021-09-21 RX ADMIN — APREPITANT 130 MG: 130 INJECTION, EMULSION INTRAVENOUS at 12:09

## 2021-09-21 RX ADMIN — SODIUM CHLORIDE 360 MG: 0.9 INJECTION, SOLUTION INTRAVENOUS at 01:09

## 2021-09-21 RX ADMIN — SODIUM CHLORIDE: 0.9 INJECTION, SOLUTION INTRAVENOUS at 12:09

## 2021-09-21 RX ADMIN — ATROPINE SULFATE 0.2 MG: 0.4 INJECTION, SOLUTION INTRAMUSCULAR; INTRAVENOUS; SUBCUTANEOUS at 12:09

## 2021-09-21 RX ADMIN — DEXAMETHASONE SODIUM PHOSPHATE 20 MG: 4 INJECTION INTRA-ARTICULAR; INTRALESIONAL; INTRAMUSCULAR; INTRAVENOUS; SOFT TISSUE at 12:09

## 2021-09-23 ENCOUNTER — DOCUMENTATION ONLY (OUTPATIENT)
Dept: INFUSION THERAPY | Facility: HOSPITAL | Age: 51
End: 2021-09-23

## 2021-09-23 ENCOUNTER — INFUSION (OUTPATIENT)
Dept: INFUSION THERAPY | Facility: HOSPITAL | Age: 51
End: 2021-09-23
Payer: COMMERCIAL

## 2021-09-23 VITALS
DIASTOLIC BLOOD PRESSURE: 90 MMHG | SYSTOLIC BLOOD PRESSURE: 141 MMHG | TEMPERATURE: 98 F | RESPIRATION RATE: 18 BRPM | HEART RATE: 68 BPM

## 2021-09-23 DIAGNOSIS — C18.9 ADENOCARCINOMA OF COLON: Primary | ICD-10-CM

## 2021-09-23 PROCEDURE — 96523 IRRIG DRUG DELIVERY DEVICE: CPT | Mod: PN

## 2021-09-23 PROCEDURE — 63600175 PHARM REV CODE 636 W HCPCS: Mod: PN | Performed by: HOSPITALIST

## 2021-09-23 PROCEDURE — 25000003 PHARM REV CODE 250: Mod: PN | Performed by: HOSPITALIST

## 2021-09-23 PROCEDURE — A4216 STERILE WATER/SALINE, 10 ML: HCPCS | Mod: PN | Performed by: HOSPITALIST

## 2021-09-23 RX ORDER — SODIUM CHLORIDE 0.9 % (FLUSH) 0.9 %
10 SYRINGE (ML) INJECTION
Status: DISCONTINUED | OUTPATIENT
Start: 2021-09-23 | End: 2021-09-23 | Stop reason: HOSPADM

## 2021-09-23 RX ORDER — HEPARIN 100 UNIT/ML
500 SYRINGE INTRAVENOUS
Status: DISCONTINUED | OUTPATIENT
Start: 2021-09-23 | End: 2021-09-23 | Stop reason: HOSPADM

## 2021-09-23 RX ADMIN — HEPARIN 500 UNITS: 100 SYRINGE at 01:09

## 2021-09-23 RX ADMIN — Medication 10 ML: at 01:09

## 2021-09-28 ENCOUNTER — DOCUMENTATION ONLY (OUTPATIENT)
Dept: INFUSION THERAPY | Facility: HOSPITAL | Age: 51
End: 2021-09-28

## 2021-10-07 ENCOUNTER — TELEPHONE (OUTPATIENT)
Dept: SURGERY | Facility: CLINIC | Age: 51
End: 2021-10-07

## 2021-10-07 DIAGNOSIS — Z01.818 PRE-OP TESTING: ICD-10-CM

## 2021-10-07 DIAGNOSIS — L02.211 ABDOMINAL WALL ABSCESS: Primary | ICD-10-CM

## 2021-10-24 ENCOUNTER — HOSPITAL ENCOUNTER (OUTPATIENT)
Dept: PREADMISSION TESTING | Facility: HOSPITAL | Age: 51
Discharge: HOME | End: 2021-10-24
Attending: COLON & RECTAL SURGERY
Payer: COMMERCIAL

## 2021-10-24 DIAGNOSIS — Z01.818 PRE-OP TESTING: ICD-10-CM

## 2021-10-24 LAB
SARS-COV-2 RNA RESP QL NAA+PROBE: NOT DETECTED
SARS-COV-2- CYCLE NUMBER: NORMAL

## 2021-10-24 PROCEDURE — U0005 INFEC AGEN DETEC AMPLI PROBE: HCPCS | Performed by: COLON & RECTAL SURGERY

## 2021-10-24 PROCEDURE — U0003 INFECTIOUS AGENT DETECTION BY NUCLEIC ACID (DNA OR RNA); SEVERE ACUTE RESPIRATORY SYNDROME CORONAVIRUS 2 (SARS-COV-2) (CORONAVIRUS DISEASE [COVID-19]), AMPLIFIED PROBE TECHNIQUE, MAKING USE OF HIGH THROUGHPUT TECHNOLOGIES AS DESCRIBED BY CMS-2020-01-R: HCPCS | Performed by: COLON & RECTAL SURGERY

## 2021-10-25 ENCOUNTER — TELEPHONE (OUTPATIENT)
Dept: SURGERY | Facility: HOSPITAL | Age: 51
End: 2021-10-25

## 2021-10-25 DIAGNOSIS — L02.211 ABDOMINAL WALL ABSCESS: Primary | ICD-10-CM

## 2021-10-26 ENCOUNTER — TELEPHONE (OUTPATIENT)
Dept: SURGERY | Facility: CLINIC | Age: 51
End: 2021-10-26

## 2021-10-27 ENCOUNTER — ANESTHESIA (OUTPATIENT)
Dept: SURGERY | Facility: HOSPITAL | Age: 51
End: 2021-10-27
Payer: COMMERCIAL

## 2021-10-27 ENCOUNTER — ANESTHESIA EVENT (OUTPATIENT)
Dept: SURGERY | Facility: HOSPITAL | Age: 51
End: 2021-10-27
Payer: COMMERCIAL

## 2021-10-27 ENCOUNTER — HOSPITAL ENCOUNTER (OUTPATIENT)
Facility: HOSPITAL | Age: 51
Discharge: HOME | End: 2021-10-27
Attending: COLON & RECTAL SURGERY | Admitting: COLON & RECTAL SURGERY
Payer: COMMERCIAL

## 2021-10-27 VITALS
WEIGHT: 280 LBS | TEMPERATURE: 98 F | BODY MASS INDEX: 34.82 KG/M2 | HEIGHT: 75 IN | HEART RATE: 80 BPM | RESPIRATION RATE: 18 BRPM | SYSTOLIC BLOOD PRESSURE: 147 MMHG | OXYGEN SATURATION: 100 % | DIASTOLIC BLOOD PRESSURE: 88 MMHG

## 2021-10-27 DIAGNOSIS — T81.30XA ABDOMINAL WOUND DEHISCENCE, INITIAL ENCOUNTER: ICD-10-CM

## 2021-10-27 DIAGNOSIS — L02.211 ABDOMINAL WALL ABSCESS: Primary | ICD-10-CM

## 2021-10-27 DIAGNOSIS — C78.6 MALIGNANT NEOPLASM OF COLON METASTATIC TO PERITONEUM: ICD-10-CM

## 2021-10-27 DIAGNOSIS — K62.89 RECTAL PAIN: ICD-10-CM

## 2021-10-27 DIAGNOSIS — C18.9 MALIGNANT NEOPLASM OF COLON METASTATIC TO PERITONEUM: ICD-10-CM

## 2021-10-27 PROBLEM — T81.321A ABDOMINAL WOUND DEHISCENCE: Status: ACTIVE | Noted: 2021-10-27

## 2021-10-27 LAB
GRAM STN SPEC: NORMAL
POCT GLUCOSE: 105 MG/DL (ref 70–110)

## 2021-10-27 PROCEDURE — 97605 NEG PRS WND THER DME<=50SQCM: CPT | Mod: ,,, | Performed by: COLON & RECTAL SURGERY

## 2021-10-27 PROCEDURE — 11045 DBRDMT SUBQ TISS EACH ADDL: CPT | Mod: ,,, | Performed by: COLON & RECTAL SURGERY

## 2021-10-27 PROCEDURE — 63600175 PHARM REV CODE 636 W HCPCS: Performed by: NURSE ANESTHETIST, CERTIFIED REGISTERED

## 2021-10-27 PROCEDURE — 25000003 PHARM REV CODE 250: Performed by: COLON & RECTAL SURGERY

## 2021-10-27 PROCEDURE — 87075 CULTR BACTERIA EXCEPT BLOOD: CPT | Performed by: COLON & RECTAL SURGERY

## 2021-10-27 PROCEDURE — 87070 CULTURE OTHR SPECIMN AEROBIC: CPT | Performed by: COLON & RECTAL SURGERY

## 2021-10-27 PROCEDURE — 88304 TISSUE EXAM BY PATHOLOGIST: CPT | Mod: 26,,, | Performed by: PATHOLOGY

## 2021-10-27 PROCEDURE — 97605 PR NEG PRESS WOUND THERAPY (NPWT) W/NON-DISPOSABLE WOUND VAC DEVICE (DME), <=50 CM: ICD-10-PCS | Mod: ,,, | Performed by: COLON & RECTAL SURGERY

## 2021-10-27 PROCEDURE — 87077 CULTURE AEROBIC IDENTIFY: CPT | Performed by: COLON & RECTAL SURGERY

## 2021-10-27 PROCEDURE — 87205 SMEAR GRAM STAIN: CPT | Performed by: COLON & RECTAL SURGERY

## 2021-10-27 PROCEDURE — 82962 GLUCOSE BLOOD TEST: CPT | Performed by: COLON & RECTAL SURGERY

## 2021-10-27 PROCEDURE — 36000707: Performed by: COLON & RECTAL SURGERY

## 2021-10-27 PROCEDURE — 25000003 PHARM REV CODE 250: Performed by: NURSE ANESTHETIST, CERTIFIED REGISTERED

## 2021-10-27 PROCEDURE — 37000009 HC ANESTHESIA EA ADD 15 MINS: Performed by: COLON & RECTAL SURGERY

## 2021-10-27 PROCEDURE — 11045 PR DEB SUBQ TISSUE ADD-ON: ICD-10-PCS | Mod: ,,, | Performed by: COLON & RECTAL SURGERY

## 2021-10-27 PROCEDURE — 37000008 HC ANESTHESIA 1ST 15 MINUTES: Performed by: COLON & RECTAL SURGERY

## 2021-10-27 PROCEDURE — D9220A PRA ANESTHESIA: ICD-10-PCS | Mod: ,,, | Performed by: NURSE ANESTHETIST, CERTIFIED REGISTERED

## 2021-10-27 PROCEDURE — D9220A PRA ANESTHESIA: ICD-10-PCS | Mod: ,,, | Performed by: ANESTHESIOLOGY

## 2021-10-27 PROCEDURE — D9220A PRA ANESTHESIA: Mod: ,,, | Performed by: NURSE ANESTHETIST, CERTIFIED REGISTERED

## 2021-10-27 PROCEDURE — 11042 DBRDMT SUBQ TIS 1ST 20SQCM/<: CPT | Mod: ,,, | Performed by: COLON & RECTAL SURGERY

## 2021-10-27 PROCEDURE — 71000015 HC POSTOP RECOV 1ST HR: Performed by: COLON & RECTAL SURGERY

## 2021-10-27 PROCEDURE — D9220A PRA ANESTHESIA: Mod: ,,, | Performed by: ANESTHESIOLOGY

## 2021-10-27 PROCEDURE — 36000706: Performed by: COLON & RECTAL SURGERY

## 2021-10-27 PROCEDURE — 88304 TISSUE EXAM BY PATHOLOGIST: CPT | Performed by: PATHOLOGY

## 2021-10-27 PROCEDURE — 87186 SC STD MICRODIL/AGAR DIL: CPT | Mod: 59 | Performed by: COLON & RECTAL SURGERY

## 2021-10-27 PROCEDURE — 88304 PR  SURG PATH,LEVEL III: ICD-10-PCS | Mod: 26,,, | Performed by: PATHOLOGY

## 2021-10-27 PROCEDURE — 71000044 HC DOSC ROUTINE RECOVERY FIRST HOUR: Performed by: COLON & RECTAL SURGERY

## 2021-10-27 PROCEDURE — 25000003 PHARM REV CODE 250: Performed by: NURSE PRACTITIONER

## 2021-10-27 PROCEDURE — 87102 FUNGUS ISOLATION CULTURE: CPT | Performed by: COLON & RECTAL SURGERY

## 2021-10-27 PROCEDURE — 11042 PR DEBRIDEMENT, SKIN, SUB-Q TISSUE,=<20 SQ CM: ICD-10-PCS | Mod: ,,, | Performed by: COLON & RECTAL SURGERY

## 2021-10-27 RX ORDER — MUPIROCIN 20 MG/G
OINTMENT TOPICAL
Status: DISCONTINUED | OUTPATIENT
Start: 2021-10-27 | End: 2021-10-27 | Stop reason: HOSPADM

## 2021-10-27 RX ORDER — BUPIVACAINE HYDROCHLORIDE 2.5 MG/ML
INJECTION, SOLUTION EPIDURAL; INFILTRATION; INTRACAUDAL
Status: DISCONTINUED | OUTPATIENT
Start: 2021-10-27 | End: 2021-10-27 | Stop reason: HOSPADM

## 2021-10-27 RX ORDER — IBUPROFEN 600 MG/1
600 TABLET ORAL 4 TIMES DAILY
Qty: 28 TABLET | Refills: 0 | Status: SHIPPED | OUTPATIENT
Start: 2021-10-27 | End: 2021-11-03

## 2021-10-27 RX ORDER — SODIUM CHLORIDE 9 MG/ML
INJECTION, SOLUTION INTRAVENOUS CONTINUOUS
Status: DISCONTINUED | OUTPATIENT
Start: 2021-10-27 | End: 2021-10-27 | Stop reason: HOSPADM

## 2021-10-27 RX ORDER — LIDOCAINE HYDROCHLORIDE 10 MG/ML
INJECTION, SOLUTION INTRAVENOUS
Status: DISCONTINUED | OUTPATIENT
Start: 2021-10-27 | End: 2021-10-27

## 2021-10-27 RX ORDER — LIDOCAINE HYDROCHLORIDE 10 MG/ML
1 INJECTION INFILTRATION; PERINEURAL ONCE
Status: COMPLETED | OUTPATIENT
Start: 2021-10-27 | End: 2021-10-27

## 2021-10-27 RX ORDER — OXYCODONE HYDROCHLORIDE 5 MG/1
5 TABLET ORAL EVERY 4 HOURS PRN
Qty: 20 TABLET | Refills: 0 | Status: SHIPPED | OUTPATIENT
Start: 2021-10-27 | End: 2021-11-01

## 2021-10-27 RX ORDER — FENTANYL CITRATE 50 UG/ML
INJECTION, SOLUTION INTRAMUSCULAR; INTRAVENOUS
Status: DISCONTINUED | OUTPATIENT
Start: 2021-10-27 | End: 2021-10-27

## 2021-10-27 RX ORDER — MIDAZOLAM HYDROCHLORIDE 1 MG/ML
INJECTION, SOLUTION INTRAMUSCULAR; INTRAVENOUS
Status: DISCONTINUED | OUTPATIENT
Start: 2021-10-27 | End: 2021-10-27

## 2021-10-27 RX ORDER — ACETAMINOPHEN 500 MG
1000 TABLET ORAL EVERY 6 HOURS
Qty: 80 TABLET | Refills: 0 | Status: SHIPPED | OUTPATIENT
Start: 2021-10-27 | End: 2021-11-06

## 2021-10-27 RX ORDER — POLYETHYLENE GLYCOL 3350 17 G/17G
17 POWDER, FOR SOLUTION ORAL DAILY
Qty: 14 EACH | Refills: 0 | Status: SHIPPED | OUTPATIENT
Start: 2021-10-27 | End: 2021-11-10

## 2021-10-27 RX ORDER — CEFAZOLIN SODIUM 1 G/3ML
INJECTION, POWDER, FOR SOLUTION INTRAMUSCULAR; INTRAVENOUS
Status: DISCONTINUED | OUTPATIENT
Start: 2021-10-27 | End: 2021-10-27

## 2021-10-27 RX ORDER — PROPOFOL 10 MG/ML
VIAL (ML) INTRAVENOUS
Status: DISCONTINUED | OUTPATIENT
Start: 2021-10-27 | End: 2021-10-27

## 2021-10-27 RX ORDER — ROCURONIUM BROMIDE 10 MG/ML
INJECTION, SOLUTION INTRAVENOUS
Status: DISCONTINUED | OUTPATIENT
Start: 2021-10-27 | End: 2021-10-27

## 2021-10-27 RX ORDER — ONDANSETRON 2 MG/ML
INJECTION INTRAMUSCULAR; INTRAVENOUS
Status: DISCONTINUED | OUTPATIENT
Start: 2021-10-27 | End: 2021-10-27

## 2021-10-27 RX ADMIN — PROPOFOL 200 MG: 10 INJECTION, EMULSION INTRAVENOUS at 11:10

## 2021-10-27 RX ADMIN — MUPIROCIN: 20 OINTMENT TOPICAL at 09:10

## 2021-10-27 RX ADMIN — SODIUM CHLORIDE: 0.9 INJECTION, SOLUTION INTRAVENOUS at 09:10

## 2021-10-27 RX ADMIN — LIDOCAINE HYDROCHLORIDE 1 ML: 10 INJECTION, SOLUTION EPIDURAL; INFILTRATION; INTRACAUDAL at 09:10

## 2021-10-27 RX ADMIN — ROCURONIUM BROMIDE 50 MG: 10 INJECTION, SOLUTION INTRAVENOUS at 11:10

## 2021-10-27 RX ADMIN — SODIUM CHLORIDE: 0.9 INJECTION, SOLUTION INTRAVENOUS at 11:10

## 2021-10-27 RX ADMIN — SODIUM CHLORIDE: 0.9 INJECTION, SOLUTION INTRAVENOUS at 10:10

## 2021-10-27 RX ADMIN — ONDANSETRON 4 MG: 2 INJECTION INTRAMUSCULAR; INTRAVENOUS at 11:10

## 2021-10-27 RX ADMIN — CEFAZOLIN 3 G: 330 INJECTION, POWDER, FOR SOLUTION INTRAMUSCULAR; INTRAVENOUS at 11:10

## 2021-10-27 RX ADMIN — LIDOCAINE HYDROCHLORIDE 100 MG: 10 INJECTION, SOLUTION INTRAVENOUS at 11:10

## 2021-10-27 RX ADMIN — FENTANYL CITRATE 100 MCG: 50 INJECTION, SOLUTION INTRAMUSCULAR; INTRAVENOUS at 11:10

## 2021-10-27 RX ADMIN — MIDAZOLAM HYDROCHLORIDE 2 MG: 1 INJECTION, SOLUTION INTRAMUSCULAR; INTRAVENOUS at 10:10

## 2021-10-27 RX ADMIN — SUGAMMADEX 400 MG: 100 INJECTION, SOLUTION INTRAVENOUS at 11:10

## 2021-10-28 LAB — POCT GLUCOSE: 111 MG/DL (ref 70–110)

## 2021-10-29 PROCEDURE — G0180 PR HOME HEALTH MD CERTIFICATION: ICD-10-PCS | Mod: ,,, | Performed by: COLON & RECTAL SURGERY

## 2021-10-29 PROCEDURE — G0180 MD CERTIFICATION HHA PATIENT: HCPCS | Mod: ,,, | Performed by: COLON & RECTAL SURGERY

## 2021-10-30 LAB
BACTERIA SPEC AEROBE CULT: ABNORMAL
BACTERIA SPEC AEROBE CULT: ABNORMAL

## 2021-11-01 LAB — BACTERIA SPEC ANAEROBE CULT: NORMAL

## 2021-11-02 LAB
FINAL PATHOLOGIC DIAGNOSIS: NORMAL
Lab: NORMAL

## 2021-11-03 ENCOUNTER — TELEPHONE (OUTPATIENT)
Dept: INFECTIOUS DISEASES | Facility: CLINIC | Age: 51
End: 2021-11-03

## 2021-11-03 ENCOUNTER — DOCUMENT SCAN (OUTPATIENT)
Dept: HOME HEALTH SERVICES | Facility: HOSPITAL | Age: 51
End: 2021-11-03

## 2021-11-03 DIAGNOSIS — L02.211 ABDOMINAL WALL ABSCESS: Primary | ICD-10-CM

## 2021-11-03 RX ORDER — CIPROFLOXACIN 500 MG/1
500 TABLET ORAL EVERY 12 HOURS
Qty: 14 TABLET | Refills: 0 | Status: SHIPPED | OUTPATIENT
Start: 2021-11-03 | End: 2021-11-10

## 2021-11-10 ENCOUNTER — DOCUMENT SCAN (OUTPATIENT)
Dept: HOME HEALTH SERVICES | Facility: HOSPITAL | Age: 51
End: 2021-11-10

## 2021-11-11 ENCOUNTER — EXTERNAL HOME HEALTH (OUTPATIENT)
Dept: HOME HEALTH SERVICES | Facility: HOSPITAL | Age: 51
End: 2021-11-11
Payer: COMMERCIAL

## 2021-11-22 ENCOUNTER — TELEPHONE (OUTPATIENT)
Dept: SURGERY | Facility: CLINIC | Age: 51
End: 2021-11-22

## 2021-11-23 ENCOUNTER — OFFICE VISIT (OUTPATIENT)
Dept: SURGERY | Facility: CLINIC | Age: 51
End: 2021-11-23
Payer: COMMERCIAL

## 2021-11-23 VITALS
SYSTOLIC BLOOD PRESSURE: 131 MMHG | DIASTOLIC BLOOD PRESSURE: 78 MMHG | BODY MASS INDEX: 33.99 KG/M2 | HEIGHT: 75 IN | WEIGHT: 273.38 LBS | HEART RATE: 80 BPM

## 2021-11-23 DIAGNOSIS — L02.211 ABDOMINAL WALL ABSCESS: Primary | ICD-10-CM

## 2021-11-23 DIAGNOSIS — C18.9 ADENOCARCINOMA OF COLON: ICD-10-CM

## 2021-11-23 PROCEDURE — 99024 PR POST-OP FOLLOW-UP VISIT: ICD-10-PCS | Mod: S$GLB,,, | Performed by: COLON & RECTAL SURGERY

## 2021-11-23 PROCEDURE — 99999 PR PBB SHADOW E&M-EST. PATIENT-LVL IV: ICD-10-PCS | Mod: PBBFAC,,, | Performed by: COLON & RECTAL SURGERY

## 2021-11-23 PROCEDURE — 99999 PR PBB SHADOW E&M-EST. PATIENT-LVL IV: CPT | Mod: PBBFAC,,, | Performed by: COLON & RECTAL SURGERY

## 2021-11-23 PROCEDURE — 99024 POSTOP FOLLOW-UP VISIT: CPT | Mod: S$GLB,,, | Performed by: COLON & RECTAL SURGERY

## 2021-11-26 LAB — FUNGUS SPEC CULT: NORMAL

## 2021-12-13 ENCOUNTER — PATIENT MESSAGE (OUTPATIENT)
Dept: SURGERY | Facility: CLINIC | Age: 51
End: 2021-12-13

## 2021-12-28 ENCOUNTER — EXTERNAL HOME HEALTH (OUTPATIENT)
Dept: HOME HEALTH SERVICES | Facility: HOSPITAL | Age: 51
End: 2021-12-28
Payer: COMMERCIAL

## 2021-12-28 PROCEDURE — G0179 PR HOME HEALTH MD RECERTIFICATION: ICD-10-PCS | Mod: ,,, | Performed by: COLON & RECTAL SURGERY

## 2021-12-28 PROCEDURE — G0179 MD RECERTIFICATION HHA PT: HCPCS | Mod: ,,, | Performed by: COLON & RECTAL SURGERY

## 2022-01-03 PROBLEM — E11.65 TYPE 2 DIABETES MELLITUS WITH HYPERGLYCEMIA, WITHOUT LONG-TERM CURRENT USE OF INSULIN: Status: ACTIVE | Noted: 2021-06-23

## 2022-01-18 ENCOUNTER — DOCUMENT SCAN (OUTPATIENT)
Dept: HOME HEALTH SERVICES | Facility: HOSPITAL | Age: 52
End: 2022-01-18
Payer: MEDICARE

## 2022-01-28 ENCOUNTER — OFFICE VISIT (OUTPATIENT)
Dept: SURGERY | Facility: CLINIC | Age: 52
End: 2022-01-28
Payer: MEDICARE

## 2022-01-28 ENCOUNTER — PATIENT MESSAGE (OUTPATIENT)
Dept: SURGERY | Facility: CLINIC | Age: 52
End: 2022-01-28
Payer: MEDICARE

## 2022-01-28 ENCOUNTER — TELEPHONE (OUTPATIENT)
Dept: SURGERY | Facility: CLINIC | Age: 52
End: 2022-01-28
Payer: MEDICARE

## 2022-01-28 VITALS
BODY MASS INDEX: 34.1 KG/M2 | DIASTOLIC BLOOD PRESSURE: 98 MMHG | SYSTOLIC BLOOD PRESSURE: 173 MMHG | HEART RATE: 120 BPM | WEIGHT: 274.25 LBS | HEIGHT: 75 IN

## 2022-01-28 DIAGNOSIS — N52.35 ERECTILE DYSFUNCTION FOLLOWING RADIATION THERAPY: Primary | ICD-10-CM

## 2022-01-28 DIAGNOSIS — L02.211 ABDOMINAL WALL ABSCESS: ICD-10-CM

## 2022-01-28 PROCEDURE — 3061F NEG MICROALBUMINURIA REV: CPT | Mod: CPTII,S$GLB,, | Performed by: COLON & RECTAL SURGERY

## 2022-01-28 PROCEDURE — 1160F RVW MEDS BY RX/DR IN RCRD: CPT | Mod: CPTII,S$GLB,, | Performed by: COLON & RECTAL SURGERY

## 2022-01-28 PROCEDURE — 1159F PR MEDICATION LIST DOCUMENTED IN MEDICAL RECORD: ICD-10-PCS | Mod: CPTII,S$GLB,, | Performed by: COLON & RECTAL SURGERY

## 2022-01-28 PROCEDURE — 3061F PR NEG MICROALBUMINURIA RESULT DOCUMENTED/REVIEW: ICD-10-PCS | Mod: CPTII,S$GLB,, | Performed by: COLON & RECTAL SURGERY

## 2022-01-28 PROCEDURE — 99213 OFFICE O/P EST LOW 20 MIN: CPT | Mod: 25,S$GLB,, | Performed by: COLON & RECTAL SURGERY

## 2022-01-28 PROCEDURE — 3080F PR MOST RECENT DIASTOLIC BLOOD PRESSURE >= 90 MM HG: ICD-10-PCS | Mod: CPTII,S$GLB,, | Performed by: COLON & RECTAL SURGERY

## 2022-01-28 PROCEDURE — 99213 PR OFFICE/OUTPT VISIT, EST, LEVL III, 20-29 MIN: ICD-10-PCS | Mod: 25,S$GLB,, | Performed by: COLON & RECTAL SURGERY

## 2022-01-28 PROCEDURE — 1159F MED LIST DOCD IN RCRD: CPT | Mod: CPTII,S$GLB,, | Performed by: COLON & RECTAL SURGERY

## 2022-01-28 PROCEDURE — 3077F PR MOST RECENT SYSTOLIC BLOOD PRESSURE >= 140 MM HG: ICD-10-PCS | Mod: CPTII,S$GLB,, | Performed by: COLON & RECTAL SURGERY

## 2022-01-28 PROCEDURE — 3044F PR MOST RECENT HEMOGLOBIN A1C LEVEL <7.0%: ICD-10-PCS | Mod: CPTII,S$GLB,, | Performed by: COLON & RECTAL SURGERY

## 2022-01-28 PROCEDURE — 3077F SYST BP >= 140 MM HG: CPT | Mod: CPTII,S$GLB,, | Performed by: COLON & RECTAL SURGERY

## 2022-01-28 PROCEDURE — 3066F NEPHROPATHY DOC TX: CPT | Mod: CPTII,S$GLB,, | Performed by: COLON & RECTAL SURGERY

## 2022-01-28 PROCEDURE — 99999 PR PBB SHADOW E&M-EST. PATIENT-LVL V: CPT | Mod: PBBFAC,,, | Performed by: COLON & RECTAL SURGERY

## 2022-01-28 PROCEDURE — 3080F DIAST BP >= 90 MM HG: CPT | Mod: CPTII,S$GLB,, | Performed by: COLON & RECTAL SURGERY

## 2022-01-28 PROCEDURE — 3008F BODY MASS INDEX DOCD: CPT | Mod: CPTII,S$GLB,, | Performed by: COLON & RECTAL SURGERY

## 2022-01-28 PROCEDURE — 10060 PR DRAIN SKIN ABSCESS SIMPLE: ICD-10-PCS | Mod: S$GLB,,, | Performed by: COLON & RECTAL SURGERY

## 2022-01-28 PROCEDURE — 3066F PR DOCUMENTATION OF TREATMENT FOR NEPHROPATHY: ICD-10-PCS | Mod: CPTII,S$GLB,, | Performed by: COLON & RECTAL SURGERY

## 2022-01-28 PROCEDURE — 1160F PR REVIEW ALL MEDS BY PRESCRIBER/CLIN PHARMACIST DOCUMENTED: ICD-10-PCS | Mod: CPTII,S$GLB,, | Performed by: COLON & RECTAL SURGERY

## 2022-01-28 PROCEDURE — 3044F HG A1C LEVEL LT 7.0%: CPT | Mod: CPTII,S$GLB,, | Performed by: COLON & RECTAL SURGERY

## 2022-01-28 PROCEDURE — 3008F PR BODY MASS INDEX (BMI) DOCUMENTED: ICD-10-PCS | Mod: CPTII,S$GLB,, | Performed by: COLON & RECTAL SURGERY

## 2022-01-28 PROCEDURE — 10060 I&D ABSCESS SIMPLE/SINGLE: CPT | Mod: S$GLB,,, | Performed by: COLON & RECTAL SURGERY

## 2022-01-28 PROCEDURE — 99999 PR PBB SHADOW E&M-EST. PATIENT-LVL V: ICD-10-PCS | Mod: PBBFAC,,, | Performed by: COLON & RECTAL SURGERY

## 2022-01-28 RX ORDER — SILDENAFIL 100 MG/1
100 TABLET, FILM COATED ORAL DAILY PRN
Qty: 30 TABLET | Refills: 5 | Status: SHIPPED | OUTPATIENT
Start: 2022-01-28 | End: 2022-01-31 | Stop reason: SDUPTHER

## 2022-01-28 RX ORDER — TADALAFIL 20 MG/1
20 TABLET ORAL DAILY
Qty: 30 TABLET | Refills: 11 | Status: SHIPPED | OUTPATIENT
Start: 2022-01-28 | End: 2022-04-05

## 2022-01-28 NOTE — TELEPHONE ENCOUNTER
----- Message from KARIN Mittal MD sent at 1/28/2022 11:24 AM CST -----  Can he get a wound check appt with me?    Dane

## 2022-01-28 NOTE — TELEPHONE ENCOUNTER
Spoke with patient regarding appointment for wound check. Patient state that he will call back later today to schedule- he is at another appointment.

## 2022-01-29 NOTE — PROGRESS NOTES
CRS Office Followup Visit    SUBJECTIVE:     Chief Complaint: abdominal wall abscess.      History:  History of colon cancer s/p sigmoid colectomy x 2  Initially diagnosed in 8/2017.    8/1/17 he underwent colonoscopy that confirmed lesion at 20cm endophytic and ulcerated, 1/2 of colon lumen.   8/2/17 - laparoscopic assisted left hemicolectomy -       grossly with a 4-5 cm margin in either direction.                Pathology: low grade adenocarcinoma invading through muscularis propria into subserosal adipose tissue, neg margins, no LVI or PNI, no tumor deposits. 12 LN negative for tumor. Final pathologic stage pT3 pN0 - stage II. THI, intact.  9/27/18 - colonoscopy - biopsy of ulcer at anastomosis - pathology consis with adenocarcinoma  10/8/18 - CT c/a/p - PRIYANK   10/22/18 - hemicolectomy              Pathology: pT3 pN1a w/ 1 of 16 LN positive for malignancy. Well to mod differentiated. No tumor deposits. R0 resection.              Primary tumor 1.5cm in size invading to subserosal fat but not to serosa. LVI noted. No PNI noted.   Treated with adjuvant FOLFOX completed 06/26/2019.     Following his 1st surgery, he developed low pelvic pain with pelvic floor spasm.  This was refractory to medical management and pelvic PT.     10/26/20: EUA with Botox --> improvement in pelvic pain    During workup, MRI was done to look for other causes of pelvic pain.  Found an abdominal wall mass.  This was biopsied and consistent with adenocarcinoma.     12/28/2020:  Excision of abdominal wall mass with abdominal wall reconstruction and retrorectus mesh placement  Pathology: adenocarcinoma.  Margins involved with adenocarcinoma at the level of the pubic tubercle periosteum.  Soft tissue margins all negative     Planned for XRT to the pubic symphysis completed 3/2/21 to 4/13/21.    Started adjuvant chemotherapy started on 5/31/21.    Last Colonoscopy: 8/19/2020 c-scope with Dr. Denny negative.    Current Status:    IR drainage  of seroma done 5/24/21.  Negative cultures.   Repeat IR drainage done 6/24/21.  Cultures grew out Enterococcus.    7/9/21:  IR drain was recently removed.  Presents to clinic for evaluation for a nonhealing lower abdominal wound.  Intermittent drainage.  On antibiotics with Cipro, Flagyl, and linezolid per ID.  Does have intermittent dysuria.   - incision and drainage done in clinic with packing.     - out of concern for possible infection of the permanent mesh, ID has agreed to long term abx therapy.    7/16/21:  Continues on his antibiotics.  Intermittent nausea.  No fevers.  Had serous drainage from his incision earlier yesterday in the shower.  Erythema decreased.  7/22/21:  Ongoing drainage from the Pezzar drain.  Decreased erythema.  Intermittent pain.  No foul odor.  Continues on Cipro and Zyvox.   - wound was further opened and Pezzar replaced   - holding adjuvant chemo  08/02/2021:  Presents for follow-up.  Drainage is decreasing.  Pain is lessened.  No fevers or chills.  Changed antibiotics to Avelox.  8/11/21: Repeat botox injection and abdominal wall drainage.   9/9/21: presents for repeat evaluation secondary to repeat swelling.  External opening closed 3-4 days ago.  This was followed by repeat swelling of his lower midline incision.  He then had spontaneous opening of the wound followed by spontaneous evacuation of a moderate amount blood tension non foul-smelling clear fluid.  He is currently receiving chemotherapy.  No fevers.   - does have resolution of his levator spasm and rectal pain.  10/27/21: Debridement of lower midline abdominal incision measuring 9 cm in length by 4 cm in width by 5 cm in depth with application of wound VAC.   - pathology: -Necrotic fibrous and adipose tissue with recent hemorrhage and acute   inflammation, consistent with abscess.     11/23/21: incision healing well with the wound vac.  Decreasing drainage.   Back on chemotherapy.  Having fatigue and weight loss.   "    01/28/2022:  Presents for evaluation after CT scan demonstrated persistent fluid collection underneath his incisional wound VAC.  he is having intermittent fatigue.  CT scan otherwise appeared good from metastatic disease standpoint.  He is also having difficulty with rectal function.  Lastly, he is having return of levator spasm and pelvic pain.    Review of Systems:  Review of Systems   Constitutional: Negative for chills, diaphoresis, fever, malaise/fatigue and weight loss.   HENT: Negative for congestion.    Respiratory: Negative for shortness of breath.    Cardiovascular: Negative for chest pain and leg swelling.   Gastrointestinal: Positive for abdominal pain. Negative for blood in stool, constipation, nausea and vomiting.   Genitourinary: Negative for dysuria and urgency.   Musculoskeletal: Negative for back pain and myalgias.   Skin: Negative for rash.   Neurological: Negative for dizziness and weakness.   Endo/Heme/Allergies: Does not bruise/bleed easily.   Psychiatric/Behavioral: Negative for depression.       OBJECTIVE:     Vital Signs (Most Recent)  BP (!) 173/98 (BP Location: Right arm, Patient Position: Sitting, BP Method: Medium (Automatic))   Pulse (!) 120   Ht 6' 3" (1.905 m)   Wt 124.4 kg (274 lb 4 oz)   BMI 34.28 kg/m²     Physical Exam:  General: White male in no distress   Respiratory: respirations are even and unlabored  Cardiac: regular rate  Abdomen: lower midline incision with vac.  VAC was removed.  Incision was then gently probed.  Granulation tissue was  and a pocket with approximately 20-25 cc of thick purulent foul-smelling fluid was evacuated.  The wound was then opened fully down to the level of the fascia.  Wound measured 5 cm deep by 2 cm wide by 5 cm long.  This was packed with 6 connected gauze strips.    Extremities: Warm dry and intact  Anorectal:  Deferred    Labs:  WBC = 6.  H/H 16 and 50.  Normal renal function.  CEA 1.0.      Imaging:   CT abd  Pelvis " 6/26/21 personally reviewed and shows anterior fluid collection with drain in place. The bowel appears far away from the fascia and muscle.  Bladder is thickened. CT abdomen pelvis from 01/25/2022 personally reviewed demonstrates fluid collection underneath his incisional wound VAC in his lower midline incision.  No signs of metastatic disease.    ASSESSMENT/PLAN:     Fredis was seen today for wound check.    Diagnoses and all orders for this visit:    Erectile dysfunction following radiation therapy  -     Case Request Endoscopy: ANOSCOPY WITH BOTOX  -     sildenafiL (VIAGRA) 100 MG tablet; Take 1 tablet (100 mg total) by mouth daily as needed for Erectile Dysfunction.  -     tadalafiL (CIALIS) 20 MG Tab; Take 1 tablet (20 mg total) by mouth once daily.    Abdominal wall abscess      51 y.o. male post op from wide local excision of abdominal wall metastatic deposit in a patient with recurrent sigmoid colon cancer.  WLE done on 12/28/20 and required abdominal wall reconstruction.   Pathology demonstrated positive margin at the level of the pubic tubercle on the right side.  Unable to resect further.   He was then treated with adjuvant radiotherapy and recently started adjuvant chemotherapy.  He developed an abdominal wall seroma that recently was infected with pan sensitive Enterococcus.      Wound was opened and debrided on 07/09/2021.  Pezzar drain was placed on 07/16/2021 and removed 8/2/21.  He has since developed recurrence of his abdominal wall fluid collection. Debridement performed on 10/27/21 with wound vac placement.     He presents today for evaluation for persistent abdominal fluid collection anterior to the fascia.  It appears that the wound VAC not placed deep enough into the incision.  Therefore, the subcutaneous tissues healed over a fluid collection which became infected.  This was opened today.  Discussed that he would need 2 pieces of black foam packed into the incision to allow healing.   Recommended a larger piece of black foam be pale a stat the bottom with a smaller piece on top to avoid skin irritation.  Wound measured approximately 5 cm deep.    In regards to the erectile dysfunction, Cialis and Viagra were both sent in.  This is likely secondary to radiation injury.    For the levator spasm, we will plan to repeat Botox injection in endoscopy at [facility].    Incision and drainage:  In the supine position, the anterior abdominal wall was prepped with Betadine.  A mixture of 1% lidocaine 0.25% Marcaine was injected into local anesthesia was achieved.  The incision was probed and tracked down to a purulent cavity.  Hemostat clamp was then placed in the granulation tissue was divided to open up the full extent of his midline incision.  This measured 5 cm in length by 2 cm in width by 5 cm in depth.                KARIN Mittal MD, FACS  Staff Surgeon  Colon & Rectal Surgery

## 2022-01-31 ENCOUNTER — PATIENT MESSAGE (OUTPATIENT)
Dept: SURGERY | Facility: CLINIC | Age: 52
End: 2022-01-31
Payer: MEDICARE

## 2022-01-31 DIAGNOSIS — N52.35 ERECTILE DYSFUNCTION FOLLOWING RADIATION THERAPY: ICD-10-CM

## 2022-01-31 RX ORDER — SILDENAFIL 100 MG/1
100 TABLET, FILM COATED ORAL DAILY PRN
Qty: 30 TABLET | Refills: 5 | Status: SHIPPED | OUTPATIENT
Start: 2022-01-31 | End: 2023-11-21

## 2022-02-02 ENCOUNTER — TELEPHONE (OUTPATIENT)
Dept: ENDOSCOPY | Facility: HOSPITAL | Age: 52
End: 2022-02-02
Payer: MEDICARE

## 2022-02-02 NOTE — TELEPHONE ENCOUNTER
Attempted to contact Fredis Ugarte  to schedule procedure , no answer, left message with call back number

## 2022-02-03 ENCOUNTER — TELEPHONE (OUTPATIENT)
Dept: ENDOSCOPY | Facility: HOSPITAL | Age: 52
End: 2022-02-03
Payer: MEDICARE

## 2022-02-03 DIAGNOSIS — Z01.818 PRE-OP TESTING: ICD-10-CM

## 2022-02-03 NOTE — TELEPHONE ENCOUNTER
Spoke to Patient procedure schedule states it is an hour drive to [place] will try to get covid test done in [place] if not will opt for Rapid with the understanding that if it is positive th procedure will be rescheduled

## 2022-02-21 ENCOUNTER — TELEPHONE (OUTPATIENT)
Dept: SURGERY | Facility: CLINIC | Age: 52
End: 2022-02-21
Payer: MEDICARE

## 2022-02-21 ENCOUNTER — PATIENT MESSAGE (OUTPATIENT)
Dept: SURGERY | Facility: CLINIC | Age: 52
End: 2022-02-21
Payer: MEDICARE

## 2022-02-21 NOTE — TELEPHONE ENCOUNTER
Called pt regarding covid test before the procedure. Instructed them to receive rapid test day of procedure due to them living more than 50 miles away.

## 2022-02-25 ENCOUNTER — EXTERNAL HOME HEALTH (OUTPATIENT)
Dept: HOME HEALTH SERVICES | Facility: HOSPITAL | Age: 52
End: 2022-02-25
Payer: MEDICARE

## 2022-02-26 PROCEDURE — G0179 MD RECERTIFICATION HHA PT: HCPCS | Mod: ,,, | Performed by: COLON & RECTAL SURGERY

## 2022-02-26 PROCEDURE — G0179 PR HOME HEALTH MD RECERTIFICATION: ICD-10-PCS | Mod: ,,, | Performed by: COLON & RECTAL SURGERY

## 2022-03-02 ENCOUNTER — TELEPHONE (OUTPATIENT)
Dept: SURGERY | Facility: CLINIC | Age: 52
End: 2022-03-02
Payer: MEDICARE

## 2022-03-02 NOTE — TELEPHONE ENCOUNTER
Called Simeon from [facility] regarding medical photos, he said he was unable to send them through the portal so I provided him with Dr. Mittal's email.

## 2022-03-02 NOTE — TELEPHONE ENCOUNTER
----- Message from Urvashi Ledezma sent at 3/2/2022  8:20 AM CST -----  Regarding: Wound photos  Contact: Simeon marshall/[phone]  Calling to get an e-mail  address to send photos doctor requested of patient's wound. Please call

## 2022-04-05 PROBLEM — E27.40 ADRENAL INSUFFICIENCY: Status: ACTIVE | Noted: 2022-04-05

## 2022-04-27 PROCEDURE — G0179 MD RECERTIFICATION HHA PT: HCPCS | Mod: ,,, | Performed by: COLON & RECTAL SURGERY

## 2022-04-27 PROCEDURE — G0179 PR HOME HEALTH MD RECERTIFICATION: ICD-10-PCS | Mod: ,,, | Performed by: COLON & RECTAL SURGERY

## 2022-04-28 ENCOUNTER — HOSPITAL ENCOUNTER (OUTPATIENT)
Dept: RADIOLOGY | Facility: HOSPITAL | Age: 52
Discharge: HOME | End: 2022-04-28
Attending: INTERNAL MEDICINE
Payer: MEDICARE

## 2022-04-28 DIAGNOSIS — C18.9 ADENOCARCINOMA OF COLON: ICD-10-CM

## 2022-04-28 LAB — GLUCOSE SERPL-MCNC: 88 MG/DL (ref 70–110)

## 2022-04-28 PROCEDURE — 78815 PET IMAGE W/CT SKULL-THIGH: CPT | Mod: 26,PS,, | Performed by: RADIOLOGY

## 2022-04-28 PROCEDURE — 78815 NM PET CT ROUTINE: ICD-10-PCS | Mod: 26,PS,, | Performed by: RADIOLOGY

## 2022-04-28 PROCEDURE — A9552 F18 FDG: HCPCS | Mod: PN

## 2022-04-28 NOTE — PROGRESS NOTES
PET Imaging Questionnaire    1. Are you a Diabetic? Recent Blood Sugar level? Yes    2. Are you anemic? Bone Marrow Stimulation Meds? No    3. Have you had a CT Scan, if so when & where was your last one? Yes -     4. Have you had a PET Scan, if so when & where was your last one? Yes -     5. Chemotherapy or currently on Chemotherapy? Yes    6. Radiation therapy? Yes    Surgical History:   Past Surgical History:   Procedure Laterality Date    ADENOIDECTOMY      ANOSCOPY N/A 2/24/2022    Procedure: ANOSCOPY;  Surgeon: KARIN Mittal MD;  [facility];  Service: Endoscopy;  Laterality: N/A;  WITH BOTOX    APPLICATION OF WOUND VACUUM-ASSISTED CLOSURE DEVICE N/A 10/27/2021    Procedure: APPLICATION, WOUND VAC;  Surgeon: KARIN Mittal MD;  [facility];  Service: Colon and Rectal;  Laterality: N/A;    COLON SURGERY      2 colon resections    EXAMINATION UNDER ANESTHESIA N/A 10/26/2020    Procedure: Exam under anesthesia, flex sig, botox injection, lithotomy;  Surgeon: KARIN Mittal MD;  Location: [facility] OR [facility] FLR;  Service: Endoscopy;  Laterality: N/A;    EXAMINATION UNDER ANESTHESIA N/A 08/11/2021    Procedure: Exam under anesthesia, lithotomy, botox injection;  Surgeon: KARIN Mittal MD;  Location: [facility] OR [facility];  Service: Endoscopy;  Laterality: N/A;    EXCISION OF MASS OF ABDOMEN N/A 12/28/2020    Procedure: EXCISION, MASS, ABDOMEN;  Surgeon: KARIN Mittal MD;  [facility];  Service: Colon and Rectal;  Laterality: N/A;    FLEXIBLE SIGMOIDOSCOPY  10/26/2020    Procedure: SIGMOIDOSCOPY, FLEXIBLE;  Surgeon: KARIN Mittal MD;  Location: [facility];  Service: Endoscopy;;    FLEXIBLE SIGMOIDOSCOPY N/A 08/11/2021    Procedure: SIGMOIDOSCOPY, FLEXIBLE;  Surgeon: KARIN Mittal MD;  Location: [facility] OR [facility] FLR;  Service: Endoscopy;  Laterality: N/A;    INJECTION OF BOTULINUM TOXIN TYPE A  10/26/2020    Procedure: INJECTION,  BOTULINUM TOXIN, TYPE A;  Surgeon: KARIN Mittal MD;  Location: [facility];  Service: Endoscopy;;    INJECTION OF BOTULINUM TOXIN TYPE A  08/11/2021    Procedure: INJECTION, BOTULINUM TOXIN, TYPE A;  Surgeon: KARIN Mittal MD;  Location: [facility] OR [facility] FLR;  Service: Endoscopy;;    INJECTION OF BOTULINUM TOXIN TYPE A N/A 2/24/2022    Procedure: INJECTION, BOTULINUM TOXIN, TYPE A;  Surgeon: KARIN Mittal MD;  Location: [facility] ENDO;  Service: Endoscopy;  Laterality: N/A;    TONSILLECTOMY      WOUND DEBRIDEMENT N/A 10/27/2021    Procedure: DEBRIDEMENT, WOUND,;  Surgeon: KARIN Mittal MD;  [facility];  Service: Colon and Rectal;  Laterality: N/A;   7.      8. Have you been fasting for at least 6 hours? Yes    9. Is there any chance you may be pregnant or breastfeeding? No    Assay: 11.48 MCi@:10.30   Injection Site:rt ac     Residual: 1.10 mCi@: 10.32   Technologist: Marcy Regalado Injected:10.38mCi

## 2022-05-12 ENCOUNTER — PATIENT MESSAGE (OUTPATIENT)
Dept: SURGERY | Facility: CLINIC | Age: 52
End: 2022-05-12
Payer: MEDICARE

## 2022-05-30 ENCOUNTER — EXTERNAL HOME HEALTH (OUTPATIENT)
Dept: HOME HEALTH SERVICES | Facility: HOSPITAL | Age: 52
End: 2022-05-30
Payer: MEDICARE

## 2022-06-03 ENCOUNTER — TELEPHONE (OUTPATIENT)
Dept: SURGERY | Facility: CLINIC | Age: 52
End: 2022-06-03
Payer: MEDICARE

## 2022-06-03 NOTE — TELEPHONE ENCOUNTER
Spoke with Juanita from [facility] to set up peer to peer.  Was notified today that the wound VAC would be approved after review and no peer to peer was necessary.    KARIN Mittal MD, FACS, FASCRS  Staff Surgeon  Colon & Rectal Surgery        ----- Message from Jessica Xavier RN sent at 6/2/2022  8:05 AM CDT -----  Contact: [phone]    ----- Message -----  From: Gilberto Hong  Sent: 6/2/2022   8:00 AM CDT  To: Daxa Vela Staff    MD Gilberto Pastrana  Caller: Juanita ([facility] Nurse)[phone] (Yesterday,  8:18 AM)  Hi Ms. Gely,     I would be happy to call them back to se up a peer to peer.  Do you have a number for me to call?  I didn't see any number in the messages that were forwarded to me.     KARIN Mittal MD, FACS, FASCRS   Staff Surgeon   Colon & Rectal Surgery     6-2-22 The number to call is [phone] Juanita is the nurse name

## 2022-08-08 ENCOUNTER — DOCUMENT SCAN (OUTPATIENT)
Dept: HOME HEALTH SERVICES | Facility: HOSPITAL | Age: 52
End: 2022-08-08
Payer: MEDICARE

## 2022-08-11 ENCOUNTER — HOSPITAL ENCOUNTER (OUTPATIENT)
Dept: RADIOLOGY | Facility: HOSPITAL | Age: 52
Discharge: HOME | End: 2022-08-11
Attending: INTERNAL MEDICINE
Payer: MEDICARE

## 2022-08-11 DIAGNOSIS — C18.4 MALIGNANT NEOPLASM OF TRANSVERSE COLON: ICD-10-CM

## 2022-08-11 LAB — GLUCOSE SERPL-MCNC: 98 MG/DL (ref 70–110)

## 2022-08-11 PROCEDURE — 78815 PET IMAGE W/CT SKULL-THIGH: CPT | Mod: 26,PS,, | Performed by: RADIOLOGY

## 2022-08-11 PROCEDURE — A9552 F18 FDG: HCPCS | Mod: PN

## 2022-08-11 PROCEDURE — 78815 NM PET CT ROUTINE: ICD-10-PCS | Mod: 26,PS,, | Performed by: RADIOLOGY

## 2022-08-11 NOTE — PROGRESS NOTES
PET Imaging Questionnaire    1. Are you a Diabetic? Recent Blood Sugar level? Yes    2. Are you anemic? Bone Marrow Stimulation Meds? No    3. Have you had a CT Scan, if so when & where was your last one? Yes -     4. Have you had a PET Scan, if so when & where was your last one? Yes -     5. Chemotherapy or currently on Chemotherapy? Yes    6. Radiation therapy? No    Surgical History:   Past Surgical History:   Procedure Laterality Date    ADENOIDECTOMY      ANOSCOPY N/A 2/24/2022    Procedure: ANOSCOPY;  Surgeon: KARIN Mittal MD;  Location: [facility];  Service: Endoscopy;  Laterality: N/A;  WITH BOTOX    APPLICATION OF WOUND VACUUM-ASSISTED CLOSURE DEVICE N/A 10/27/2021    Procedure: APPLICATION, WOUND VAC;  Surgeon: KARIN Mittal MD;  [facility];  Service: Colon and Rectal;  Laterality: N/A;    COLON SURGERY      2 colon resections    EXAMINATION UNDER ANESTHESIA N/A 10/26/2020    Procedure: Exam under anesthesia, flex sig, botox injection, lithotomy;  Surgeon: KARIN Mittal MD;  [facility];  Service: Endoscopy;  Laterality: N/A;    EXAMINATION UNDER ANESTHESIA N/A 08/11/2021    Procedure: Exam under anesthesia, lithotomy, botox injection;  Surgeon: KARIN Mittal MD;  [facility];  Service: Endoscopy;  Laterality: N/A;    EXCISION OF MASS OF ABDOMEN N/A 12/28/2020    Procedure: EXCISION, MASS, ABDOMEN;  Surgeon: KARIN Mittal MD;  [facility];  Service: Colon and Rectal;  Laterality: N/A;    FLEXIBLE SIGMOIDOSCOPY  10/26/2020    Procedure: SIGMOIDOSCOPY, FLEXIBLE;  Surgeon: KARIN Mittal MD;  Location: [facility];  Service: Endoscopy;;    FLEXIBLE SIGMOIDOSCOPY N/A 08/11/2021    Procedure: SIGMOIDOSCOPY, FLEXIBLE;  Surgeon: KARIN Mittal MD;  Location: [facility] OR 2ND FLR;  Service: Endoscopy;  Laterality: N/A;    INJECTION OF BOTULINUM TOXIN TYPE A  10/26/2020    Procedure: INJECTION, BOTULINUM  TOXIN, TYPE A;  Surgeon: KARIN Mittal MD;  Location: [facility] OR 2ND FLR;  Service: Endoscopy;;    INJECTION OF BOTULINUM TOXIN TYPE A  08/11/2021    Procedure: INJECTION, BOTULINUM TOXIN, TYPE A;  Surgeon: KARIN Mittal MD;  Location: [facility] OR 2ND FLR;  Service: Endoscopy;;    INJECTION OF BOTULINUM TOXIN TYPE A N/A 2/24/2022    Procedure: INJECTION, BOTULINUM TOXIN, TYPE A;  Surgeon: KARIN Mittal MD;  Location: [facility] ENDO;  Service: Endoscopy;  Laterality: N/A;    TONSILLECTOMY      WOUND DEBRIDEMENT N/A 10/27/2021    Procedure: DEBRIDEMENT, WOUND,;  Surgeon: KARIN Mittal MD;  [facility];  Service: Colon and Rectal;  Laterality: N/A;   7.      8. Have you been fasting for at least 6 hours? Yes    9. Is there any chance you may be pregnant or breastfeeding? No    Assay: 13.01 MCi@:10:31   Injection Site:RT Hand    Residual: .628 mCi@: 10:33   Technologist: Mian Regalado Injected:12.38mCi

## 2022-08-17 ENCOUNTER — PATIENT MESSAGE (OUTPATIENT)
Dept: SURGERY | Facility: CLINIC | Age: 52
End: 2022-08-17
Payer: MEDICARE

## 2022-08-17 DIAGNOSIS — M62.838 LEVATOR SPASM: Primary | ICD-10-CM

## 2022-08-19 ENCOUNTER — PATIENT MESSAGE (OUTPATIENT)
Dept: SURGERY | Facility: CLINIC | Age: 52
End: 2022-08-19
Payer: MEDICARE

## 2022-08-25 ENCOUNTER — TELEPHONE (OUTPATIENT)
Dept: ENDOSCOPY | Facility: HOSPITAL | Age: 52
End: 2022-08-25
Payer: MEDICARE

## 2022-08-25 ENCOUNTER — PATIENT MESSAGE (OUTPATIENT)
Dept: ENDOSCOPY | Facility: HOSPITAL | Age: 52
End: 2022-08-25
Payer: MEDICARE

## 2022-08-25 PROCEDURE — G0179 PR HOME HEALTH MD RECERTIFICATION: ICD-10-PCS | Mod: ,,, | Performed by: COLON & RECTAL SURGERY

## 2022-08-25 PROCEDURE — G0179 MD RECERTIFICATION HHA PT: HCPCS | Mod: ,,, | Performed by: COLON & RECTAL SURGERY

## 2022-08-25 NOTE — TELEPHONE ENCOUNTER
Endoscopy Scheduling Questionnaire:     COVID VACCINE?     1. Are you taking any blood thinners?  No               If Yes  Have you been on them for longer than one year?     2. Have you been diagnosed with Diverticulitis in past three months?      3. Are you on dialysis or have Kidney Disease? No    4. Previous Colonoscopy?  Yes         If yes Do you have a history of colon polyps?  Yes      6. Are you a diabetic?  No    7. Do you have a history of constipation?  Yes    8. On Adipex or Phenterimine No      Procedure scheduled with Dr. Mittal on  09/22/2022    The prep being used is None     The patient's  instructions were sent by [facility]

## 2022-09-01 ENCOUNTER — EXTERNAL HOME HEALTH (OUTPATIENT)
Dept: HOME HEALTH SERVICES | Facility: HOSPITAL | Age: 52
End: 2022-09-01
Payer: MEDICARE

## 2022-09-07 ENCOUNTER — DOCUMENT SCAN (OUTPATIENT)
Dept: HOME HEALTH SERVICES | Facility: HOSPITAL | Age: 52
End: 2022-09-07
Payer: MEDICARE

## 2022-11-10 ENCOUNTER — HOSPITAL ENCOUNTER (OUTPATIENT)
Dept: RADIOLOGY | Facility: HOSPITAL | Age: 52
Discharge: HOME | End: 2022-11-10
Attending: INTERNAL MEDICINE
Payer: MEDICARE

## 2022-11-10 DIAGNOSIS — C78.6 SECONDARY MALIGNANT NEOPLASM OF RETROPERITONEUM AND PERITONEUM: ICD-10-CM

## 2022-11-10 DIAGNOSIS — Z45.2 ENCOUNTER FOR ADJUSTMENT AND MANAGEMENT OF VASCULAR ACCESS DEVICE: ICD-10-CM

## 2022-11-10 DIAGNOSIS — C18.4 MALIGNANT NEOPLASM OF TRANSVERSE COLON: ICD-10-CM

## 2022-11-10 LAB — GLUCOSE SERPL-MCNC: 64 MG/DL (ref 70–110)

## 2022-11-10 PROCEDURE — 78815 PET IMAGE W/CT SKULL-THIGH: CPT | Mod: 26,PS,, | Performed by: RADIOLOGY

## 2022-11-10 PROCEDURE — 78815 PET IMAGE W/CT SKULL-THIGH: CPT | Mod: TC,PS,PN

## 2022-11-10 PROCEDURE — 78815 NM PET CT ROUTINE: ICD-10-PCS | Mod: 26,PS,, | Performed by: RADIOLOGY

## 2022-11-10 NOTE — PROGRESS NOTES
PET Imaging Questionnaire    Are you a Diabetic? Recent Blood Sugar level? No    Are you anemic? Bone Marrow Stimulation Meds? No    Have you had a CT Scan, if so when & where was your last one? Yes -     Have you had a PET Scan, if so when & where was your last one? Yes -     Chemotherapy or currently on Chemotherapy? Yes    Radiation therapy? Yes    Surgical History:   Past Surgical History:   Procedure Laterality Date    ADENOIDECTOMY      ANOSCOPY N/A 2/24/2022    Procedure: ANOSCOPY;  Surgeon: KARIN Mittal MD;  [facility];  Service: Endoscopy;  Laterality: N/A;  WITH BOTOX    APPLICATION OF WOUND VACUUM-ASSISTED CLOSURE DEVICE N/A 10/27/2021    Procedure: APPLICATION, WOUND VAC;  Surgeon: KARIN Mittal MD;  Location: [facility] OR 2ND FLR;  Service: Colon and Rectal;  Laterality: N/A;    COLON SURGERY      2 colon resections    EXAMINATION UNDER ANESTHESIA N/A 10/26/2020    Procedure: Exam under anesthesia, flex sig, botox injection, lithotomy;  Surgeon: KARIN Mittal MD;  [facility];  Service: Endoscopy;  Laterality: N/A;    EXAMINATION UNDER ANESTHESIA N/A 08/11/2021    Procedure: Exam under anesthesia, lithotomy, botox injection;  Surgeon: KARIN Mittal MD;  Location: [facility] OR [facility];  Service: Endoscopy;  Laterality: N/A;    EXCISION OF MASS OF ABDOMEN N/A 12/28/2020    Procedure: EXCISION, MASS, ABDOMEN;  Surgeon: KARIN Mittal MD;  [facility];  Service: Colon and Rectal;  Laterality: N/A;    FLEXIBLE SIGMOIDOSCOPY  10/26/2020    Procedure: SIGMOIDOSCOPY, FLEXIBLE;  Surgeon: KARIN Mittal MD;  Location: [facility] OR [facility]LR;  Service: Endoscopy;;    FLEXIBLE SIGMOIDOSCOPY N/A 08/11/2021    Procedure: SIGMOIDOSCOPY, FLEXIBLE;  Surgeon: KARIN Mittal MD;  Location: [facility] OR 2ND FLR;  Service: Endoscopy;  Laterality: N/A;    FLEXIBLE SIGMOIDOSCOPY N/A 9/22/2022    Procedure: Anoscopy with botox;  Surgeon: KARIN Mittal MD;   Location: [facility] ENDO;  Service: Endoscopy;  Laterality: N/A;    INJECTION OF BOTULINUM TOXIN TYPE A  10/26/2020    Procedure: INJECTION, BOTULINUM TOXIN, TYPE A;  Surgeon: KARIN Mittal MD;  Location: [facility] OR 2ND FLR;  Service: Endoscopy;;    INJECTION OF BOTULINUM TOXIN TYPE A  08/11/2021    Procedure: INJECTION, BOTULINUM TOXIN, TYPE A;  Surgeon: KARIN Mittal MD;  Location: [facility] OR 2ND FLR;  Service: Endoscopy;;    INJECTION OF BOTULINUM TOXIN TYPE A N/A 2/24/2022    Procedure: INJECTION, BOTULINUM TOXIN, TYPE A;  Surgeon: KARIN Mittal MD;  Location: [facility] ENDO;  Service: Endoscopy;  Laterality: N/A;    TONSILLECTOMY      WOUND DEBRIDEMENT N/A 10/27/2021    Procedure: DEBRIDEMENT, WOUND,;  Surgeon: KARIN Mittal MD;  [facility];  Service: Colon and Rectal;  Laterality: N/A;        Have you been fasting for at least 6 hours? Yes    Is there any chance you may be pregnant or breastfeeding? No    Assay: 13.26 MCi@:11.18   Injection Site:rt ac     Residual: .979 mCi@: 11.20   Technologist: Marcy Regalado Injected:12.28mCi

## 2022-11-14 ENCOUNTER — PATIENT MESSAGE (OUTPATIENT)
Dept: SURGERY | Facility: CLINIC | Age: 52
End: 2022-11-14
Payer: MEDICARE

## 2022-11-17 DIAGNOSIS — Z85.038 HISTORY OF COLON CANCER: Primary | ICD-10-CM

## 2022-11-17 NOTE — PROGRESS NOTES
Recent PET scan reviewed with abnormal uptake within the rectum.  Likely physiologic.  However, given his history, flexible sigmoidoscopy was offered.  Patient is due for colonoscopy in a few months.  Offered to perform colonoscopy in mid November.  Patient wishes to wait until January or February.  Case request placed for early next year.    KARIN Mittal MD, FACS, FASCRS  Staff Surgeon  Colon & Rectal Surgery

## 2022-12-05 ENCOUNTER — TELEPHONE (OUTPATIENT)
Dept: ENDOSCOPY | Facility: HOSPITAL | Age: 52
End: 2022-12-05
Payer: MEDICARE

## 2022-12-22 ENCOUNTER — TELEPHONE (OUTPATIENT)
Dept: ENDOSCOPY | Facility: HOSPITAL | Age: 52
End: 2022-12-22
Payer: MEDICARE

## 2022-12-22 RX ORDER — SODIUM, POTASSIUM,MAG SULFATES 17.5-3.13G
1 SOLUTION, RECONSTITUTED, ORAL ORAL DAILY
Qty: 1 KIT | Refills: 0 | Status: SHIPPED | OUTPATIENT
Start: 2022-12-22 | End: 2022-12-24

## 2022-12-22 NOTE — TELEPHONE ENCOUNTER
Endoscopy Scheduling Questionnaire:     COVID VACCINE?     Are you taking any blood thinners?                If Yes  Have you been on them for longer than one year?     2. Have you been diagnosed with Diverticulitis in past three months?      3. Are you on dialysis or have Kidney Disease?     4. Previous Colonoscopy?           If yes Do you have a history of colon polyps?        6. Are you a diabetic?      7. Do you have a history of constipation?      8. On Adipex or Phenterimine       Procedure scheduled with Dr. Mittal on  2/2/2023    The prep being used is Suprep    The patient's prep instructions were sent by [employer]

## 2023-02-16 ENCOUNTER — HOSPITAL ENCOUNTER (OUTPATIENT)
Dept: RADIOLOGY | Facility: HOSPITAL | Age: 53
Discharge: HOME | End: 2023-02-16
Attending: INTERNAL MEDICINE
Payer: MEDICARE

## 2023-02-16 DIAGNOSIS — Z45.2 ENCOUNTER FOR ADJUSTMENT AND MANAGEMENT OF VASCULAR ACCESS DEVICE: ICD-10-CM

## 2023-02-16 DIAGNOSIS — E66.9 OBESITY, UNSPECIFIED: ICD-10-CM

## 2023-02-16 DIAGNOSIS — E61.1 IRON DEFICIENCY: ICD-10-CM

## 2023-02-16 DIAGNOSIS — C18.4 MALIGNANT NEOPLASM OF TRANSVERSE COLON: ICD-10-CM

## 2023-02-16 DIAGNOSIS — C78.6 SECONDARY MALIGNANT NEOPLASM OF RETROPERITONEUM AND PERITONEUM: ICD-10-CM

## 2023-02-16 LAB — GLUCOSE SERPL-MCNC: 90 MG/DL (ref 70–110)

## 2023-02-16 PROCEDURE — 78815 NM PET CT ROUTINE: ICD-10-PCS | Mod: 26,PS,, | Performed by: STUDENT IN AN ORGANIZED HEALTH CARE EDUCATION/TRAINING PROGRAM

## 2023-02-16 PROCEDURE — 78815 PET IMAGE W/CT SKULL-THIGH: CPT | Mod: 26,PS,, | Performed by: STUDENT IN AN ORGANIZED HEALTH CARE EDUCATION/TRAINING PROGRAM

## 2023-02-16 PROCEDURE — A9552 F18 FDG: HCPCS | Mod: PN

## 2023-02-16 PROCEDURE — 78815 PET IMAGE W/CT SKULL-THIGH: CPT | Mod: TC,PN

## 2023-02-16 NOTE — PROGRESS NOTES
PET Imaging Questionnaire    Are you a Diabetic? Recent Blood Sugar level? Yes    Are you anemic? Bone Marrow Stimulation Meds? Yes    Have you had a CT Scan, if so when & where was your last one? Yes -     Have you had a PET Scan, if so when & where was your last one? Yes -     Chemotherapy or currently on Chemotherapy? Yes    Radiation therapy? Yes    Surgical History:   Past Surgical History:   Procedure Laterality Date    ADENOIDECTOMY      ANOSCOPY N/A 2/24/2022    Procedure: ANOSCOPY;  Surgeon: KARIN Mittal MD;  Location: [facility] ENDO;  Service: Endoscopy;  Laterality: N/A;  WITH BOTOX    APPLICATION OF WOUND VACUUM-ASSISTED CLOSURE DEVICE N/A 10/27/2021    Procedure: APPLICATION, WOUND VAC;  Surgeon: KARIN Mittal MD;  [facility];  Service: Colon and Rectal;  Laterality: N/A;    COLON SURGERY      2 colon resections    COLONOSCOPY N/A 2/2/2023    Procedure: COLONOSCOPY;  Surgeon: KARIN Mittal MD;  Location: [facility] ENDO;  Service: Endoscopy;  Laterality: N/A;  in January or February per his request please.  Thank you!    EXAMINATION UNDER ANESTHESIA N/A 10/26/2020    Procedure: Exam under anesthesia, flex sig, botox injection, lithotomy;  Surgeon: KARIN Mittal MD;  Location: [facility] OR 2ND FLR;  Service: Endoscopy;  Laterality: N/A;    EXAMINATION UNDER ANESTHESIA N/A 08/11/2021    Procedure: Exam under anesthesia, lithotomy, botox injection;  Surgeon: KARIN Mittal MD;  [facility];  Service: Endoscopy;  Laterality: N/A;    EXCISION OF MASS OF ABDOMEN N/A 12/28/2020    Procedure: EXCISION, MASS, ABDOMEN;  Surgeon: KARIN Mittal MD;  [facility];  Service: Colon and Rectal;  Laterality: N/A;    FLEXIBLE SIGMOIDOSCOPY  10/26/2020    Procedure: SIGMOIDOSCOPY, FLEXIBLE;  Surgeon: KARIN Mittal MD;  [facility];  Service: Endoscopy;;    FLEXIBLE SIGMOIDOSCOPY N/A 08/11/2021    Procedure: SIGMOIDOSCOPY, FLEXIBLE;   Surgeon: KARIN Mittal MD;  [facility];  Service: Endoscopy;  Laterality: N/A;    FLEXIBLE SIGMOIDOSCOPY N/A 9/22/2022    Procedure: Anoscopy with botox;  Surgeon: KARIN Mittal MD;  [facility];  Service: Endoscopy;  Laterality: N/A;    INJECTION OF BOTULINUM TOXIN TYPE A  10/26/2020    Procedure: INJECTION, BOTULINUM TOXIN, TYPE A;  Surgeon: KARIN Mittal MD;  Location: [facility] OR [facility]LR;  Service: Endoscopy;;    INJECTION OF BOTULINUM TOXIN TYPE A  08/11/2021    Procedure: INJECTION, BOTULINUM TOXIN, TYPE A;  Surgeon: KARIN Mittal MD;  [facility];  Service: Endoscopy;;    INJECTION OF BOTULINUM TOXIN TYPE A N/A 2/24/2022    Procedure: INJECTION, BOTULINUM TOXIN, TYPE A;  Surgeon: KARIN Mittal MD;  [facility];  Service: Endoscopy;  Laterality: N/A;    INJECTION OF BOTULINUM TOXIN TYPE A  2/2/2023    Procedure: INJECTION, BOTULINUM TOXIN, TYPE A;  Surgeon: KARIN Mittal MD;  [facility];  Service: Endoscopy;;  rectum      TONSILLECTOMY      WOUND DEBRIDEMENT N/A 10/27/2021    Procedure: DEBRIDEMENT, WOUND,;  Surgeon: KARIN Mittal MD;  [facility];  Service: Colon and Rectal;  Laterality: N/A;        Have you been fasting for at least 6 hours? Yes    Is there any chance you may be pregnant or breastfeeding? No    Assay: 13.28 MCi@10:29   Injection Site:RT AC    Residual: .682 mCi@: 10;31   Technologist: Mian Regalado Injected:12.6mCi

## 2023-03-09 ENCOUNTER — CLINICAL SUPPORT (OUTPATIENT)
Dept: REHABILITATION | Facility: HOSPITAL | Age: 53
End: 2023-03-09
Attending: INTERNAL MEDICINE
Payer: MEDICARE

## 2023-03-09 DIAGNOSIS — G89.29 CHRONIC LEFT-SIDED LOW BACK PAIN WITHOUT SCIATICA: ICD-10-CM

## 2023-03-09 DIAGNOSIS — M54.50 CHRONIC LEFT-SIDED LOW BACK PAIN WITHOUT SCIATICA: ICD-10-CM

## 2023-03-09 DIAGNOSIS — R53.1 WEAKNESS GENERALIZED: ICD-10-CM

## 2023-03-09 DIAGNOSIS — N81.89 PELVIC FLOOR WEAKNESS: ICD-10-CM

## 2023-03-09 DIAGNOSIS — R27.8 COORDINATION ABNORMAL: Primary | ICD-10-CM

## 2023-03-09 PROCEDURE — 97162 PT EVAL MOD COMPLEX 30 MIN: CPT | Mod: PN | Performed by: PHYSICAL THERAPIST

## 2023-03-09 PROCEDURE — 97110 THERAPEUTIC EXERCISES: CPT | Mod: PN | Performed by: PHYSICAL THERAPIST

## 2023-03-13 NOTE — PLAN OF CARE
[facility]  Pelvic Health Physical Therapy Initial Evaluation     Date: 3/9/2023   Name: Fredis Ugarte  Clinic Number: [id number]  Therapy Diagnosis: low back pain, generalized weakness, coordination impairments  Physician: Kinga Lee, *     Physician Orders: PT Eval and Treat  Medical Diagnosis from Referral: N81.89 (ICD-10-CM) - Pelvic floor weakness   Evaluation Date: 3/9/2023  Authorization Period Expiration: 3/1/2024  Plan of Care Expiration: 6/1/2023  Progress note: 4/6/2023  Visit # 1/12 Visits authorized: 1/1     Time In: 3:23 PM   Time Out: 4:08 PM   Total Appointment Time (timed & untimed codes): 45 minutes     Precautions: universal, cancer     Subjective      Date of onset: 2017 - initial cancer diagnosis     History of current condition - Fredis reports: patient reports back pain and significant core weakness. Patient reports impaired sensation with bowel evacuation. Gets Botox injections that have helped the rectal pain. This injection more on his left side during his most recent injection in Feb of 2023. Has just joined a gym and has been unable to go. Patient was initially diagnosed with colon cancer in 2017, re-occurrence in 2018 and 2020. Most recent surgery was in December of 2020. He did radiation and chemo after that surgery. He developed a seroma. Spent 10 months with a wound vac. Spent almost a year on antibiotics. He has tried use of an abdominal binder, but reports that it hurts.     Medical History obtained from records:  History of colon cancer s/p sigmoid colectomy x 2  Initially diagnosed in 8/2017.    8/1/17 he underwent colonoscopy that confirmed lesion at 20cm endophytic and ulcerated, 1/2 of colon lumen.   8/2/17 - laparoscopic assisted left hemicolectomy -       grossly with a 4-5 cm margin in either direction.    9/27/18 - colonoscopy - biopsy of ulcer at anastomosis - pathology consis with adenocarcinoma  10/8/18 - CT c/a/p - PRIYANK   10/22/18 -  hemicolectomy  Treated with adjuvant FOLFOX completed 06/26/2019.     Following his 1st surgery, he developed low pelvic pain with pelvic floor spasm.  This was refractory to medical management and pelvic PT.      10/26/20: EUA with Botox --> improvement in pelvic pain   12/28/2020:  Excision of abdominal wall mass with abdominal wall reconstruction and retrorectus mesh placement   Completed radiation to the pubic symphysis completed 3/2/21 to 4/13/21.     Started adjuvant chemotherapy started on 5/31/21.     Prior level of function: working cattle and [employer]  Current level of function: will get on the tractor for a few minutes then will hurt for a few days      Pain:  Location: back - lumbar  Current moderate/10  Description: point tender  Aggravating Factors/Activities that cause symptoms: Prolonged standing, Exercise, Bending, and Squatting   Easing Factors: none, laying down     Medical History: Fredis  has a past medical history of Broken bones, Colon cancer, Diabetes mellitus, Hypertension, and PONV (postoperative nausea and vomiting).      Surgical History: Fredis Ugarte  has a past surgical history that includes Adenoidectomy; Tonsillectomy; Examination under anesthesia (N/A, 10/26/2020); Flexible sigmoidoscopy (10/26/2020); Injection of botulinum toxin type A (10/26/2020); Excision of mass of abdomen (N/A, 12/28/2020); Colon surgery; Examination under anesthesia (N/A, 08/11/2021); Flexible sigmoidoscopy (N/A, 08/11/2021); Injection of botulinum toxin type A (08/11/2021); Wound debridement (N/A, 10/27/2021); Application of wound vacuum-assisted closure device (N/A, 10/27/2021); Anoscopy (N/A, 2/24/2022); Injection of botulinum toxin type A (N/A, 2/24/2022); Flexible sigmoidoscopy (N/A, 9/22/2022); Colonoscopy (N/A, 2/2/2023); and Injection of botulinum toxin type A (2/2/2023).     Medications: Fredis has a current medication list which includes the following prescription(s): alprazolam, aspirin, bd  "safetyglide syringe, butalbital-acetaminophen-caffeine -40 mg, cannabidiol (cbd), cetirizine, chlorhexidine, cholecalciferol (vitamin d3), cyanocobalamin, cyclobenzaprine, docusate sodium, empagliflozin, ferrous sulfate, freestyle anisha 14 day sensor, gabapentin, lidocaine, metoprolol succinate, morphine, NON FORMULARY MEDICATION, oxycodone-acetaminophen, pantoprazole, promethazine, sildenafil, bd integra syringe, tamsulosin, testosterone cypionate, UNABLE TO FIND, and UNABLE TO FIND, and the following Facility-Administered Medications: sodium chloride 0.9% and mupirocin.     Allergies:         Review of patient's allergies indicates:   Allergen Reactions    Cymbalta [duloxetine] Other (See Comments)       Pt developed suicidal ideations.    Penicillins Shortness Of Breath and Rash         Prior Therapy/Previous treatment included: pelvic floor manual therapy  Social History: lives with his spouse  Current exercise: would like to go to the gym, but is unable  Occupation: Pt is medically retired. 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  Prior Level of Function: patient was working cattle, riding a tractor, offshore fishing, and working  Current Level of Function: patient with significant increase in back pain after being on the tractor for 20 minutes     Pt's goals: To improve functional mobility and core strength to allow for attending the gym     OBJECTIVE      See EMR under MEDIA for written consent provided 3/9/2023  Chaperone: Declined     ORTHO SCREEN  Posture in sitting: slouched   Posture in standing: decreased lordosis  Pelvic alignment: Not assessed today    Pubic symphysis: pocket of fluid making pubic symphysis difficult to assess  Palpation: patient with tenderness to palpation to the left paraspinals     ABDOMINAL WALL ASSESSMENT  Palpation: tender over scar  Abdominal strength: TA = 2/5 with max cues  Scarring: significant scar just distal to the umbilicus about 4" in length      "      Limitation/Restriction for FOTO Oswestry Survey     Therapist reviewed FOTO scores for Fredis Ugarte on 3/9/2023.   FOTO documents entered into [employer] - see Media section.     Limitation Score: 71%         TREATMENT      Treatment Time In: 3:58 P<  Treatment Time Out: 4:08 PM  Total Treatment time (time-based codes) separate from Evaluation: 10 minutes     Therapeutic Exercise to develop strength and flexibility for 10 minutes including:  - LTR, scar massage, and TA activation in supine      Patient Education provided:   benefits of treatment and risks of treatment was discussed with the pt. Additionally, anatomy/physiology of the core was reviewed.      Home Exercises provided:  Written Home Exercises provided: Yes  Exercises were reviewed and Fredis was able to demonstrate them prior to the end of the session.    Fredis demonstrated good  understanding of the education provided.      See EMR under Patient Instructions for exercises provided 3/9/2023.     Assessment      Fredis is a 52 y.o. male referred to outpatient Physical Therapy with a medical diagnosis of N81.89 (ICD-10-CM) - Pelvic floor weakness . Pt presents with altered posture, poor knowledge of body mechanics and posture, back pain, core weakness, and dysfunctional defecation.       Pt prognosis is Excellent  Pt will benefit from skilled outpatient Physical Therapy to address the deficits stated above and in the chart below, provide pt/family education, and to maximize pt's level of independence.      Plan of care discussed with patient: Yes  Pt's spiritual, cultural and educational needs considered and patient is agreeable to the plan of care and goals as stated below:      Anticipated Barriers for therapy: None     Medical Necessity is demonstrated by the following:     History  Co-morbidities and personal factors that may impact the plan of care Co-morbidities   history of cancer     Personal Factors  social background       moderate    Examination  Body structures and functions, activity limitations and participation restrictions that may impact the plan of care Body Regions/Systems/Functions:  altered posture and poor trunk stability      Activity Limitations:  Pain with ADLs     Participation Restrictions:  ADL participation affected by pain     Activity limitations:   Learning and applying knowledge  No deficits     General Tasks and Commands  No deficits     Communication  No deficits     Mobility  No deficits     Self care  No deficits     Domestic Life  No deficits     Interactions/Relationships  No deficits     Life Areas  No deficits     Community and Social Life  No deficits          moderate   Clinical Presentation evolving clinical presentation with changing clinical characteristics moderate   Decision Making/ Complexity Score: moderate         Goals:  Short Term Goals: 6 weeks   Patient will be in with home exercise program.   Patient will be able to ambulate on TM x 5 min without increased pain.   Patient will report being able to stand for up to 15 min without increased back pain.      Long Term Goals: 12 weeks   Patient will be independent with progressive home exercise program.   Patient will report tolerating going to the gym at least 2-3 times a week.   Patient will be able to ambulate on TM x 10 min without increased back pain.      Plan      Plan of care Certification: 3/9/2023 to 6/1/23.     Outpatient Physical Therapy 1-2 times weekly for 12 weeks to include the following interventions: therapeutic exercises, therapeutic activity, neuromuscular re-education, manual therapy, patient/family education and self care/home management     Jean-Claude Cordoba, PT

## 2023-04-06 ENCOUNTER — CLINICAL SUPPORT (OUTPATIENT)
Dept: REHABILITATION | Facility: HOSPITAL | Age: 53
End: 2023-04-06
Payer: MEDICARE

## 2023-04-06 DIAGNOSIS — G89.29 CHRONIC LEFT-SIDED LOW BACK PAIN WITHOUT SCIATICA: ICD-10-CM

## 2023-04-06 DIAGNOSIS — R53.1 WEAKNESS GENERALIZED: ICD-10-CM

## 2023-04-06 DIAGNOSIS — N81.89 PELVIC FLOOR WEAKNESS: Primary | ICD-10-CM

## 2023-04-06 DIAGNOSIS — M54.50 CHRONIC LEFT-SIDED LOW BACK PAIN WITHOUT SCIATICA: ICD-10-CM

## 2023-04-06 DIAGNOSIS — R27.8 COORDINATION ABNORMAL: ICD-10-CM

## 2023-04-06 PROCEDURE — 97112 NEUROMUSCULAR REEDUCATION: CPT | Mod: PN | Performed by: PHYSICAL THERAPIST

## 2023-04-06 PROCEDURE — 97110 THERAPEUTIC EXERCISES: CPT | Mod: PN | Performed by: PHYSICAL THERAPIST

## 2023-04-06 NOTE — PROGRESS NOTES
Pelvic Health Physical Therapy   Treatment Note     Name: Fredis Ugarte  Clinic Number: [id number]    Therapy Diagnosis: No diagnosis found.  Physician: Kinga Lee, *    Visit Date: 4/6/2023    Physician Orders: PT Eval and Treat  Medical Diagnosis from Referral: N81.89 (ICD-10-CM) - Pelvic floor weakness   Evaluation Date: 3/9/2023  Authorization Period Expiration: 3/1/2024  Plan of Care Expiration: 6/1/2023  Progress note: 4/6/2023  Visit # 2/12 Visits authorized: 1/10    Cancelled Visits: 0  No Show Visits: 0    Time In: 1:00 PM  Time Out: 1:53 PM  Total Billable Time: 53 minutes    Precautions: Standard, cancer, and extensive abdominal surgery    Subjective     Pt reports: Back pain with lying supine. I want to go walk around the track at the gym. I can't handle the heat. He does report using the tiller and getting on his tractor with back pain afterwards.     He was compliant with home exercise program.  Response to previous treatment: none  Functional change: none    Pain in:  moderate back pain  Pain out: moderate back pain  Location:  back pain at the thoracolumbar junction      Objective     Fredis received therapeutic exercises to develop  strength, endurance, ROM, flexibility, posture, and core stabilization for 23 minutes including: abdominal sets and lumbar spine ROM  see log.   Encouraged him to go to the gym and monitor back pain. Encouraged pec, HS, and hip flexor ROM to improve gait mechanics.     Fredis participated in neuromuscular re-education activities to develop Coordination and Proprioception for 20 minutes including: abdominal sets with use of rehabilitative ultrasound - limited TA activation. Patient with impaired core sensation; however, I could palpate a trace TA activation.          Intervention Eval  04/06/2023    TherEx Pelvic tilts  2x10 with assist    TherEx Bridges  10xs with cues     TherEx Trunk ROM with ball standing   10xs    TherEx HS ROM seated   3xs each LE     NeuroRe-ed RUSI  TA activation, unable to get a good view of the pelvic floor        Home Exercises Provided and Patient Education Provided     Education provided:   - posture/body mechanices  Discussed progression of plan of care with patient; educated pt in activity modification; reviewed HEP with pt. Pt demonstrated and verbalized understanding of all instruction and was provided with a handout of HEP (see Patient Instructions).    Written Home Exercises Provided: yes.  Exercises were reviewed and Fredis was able to demonstrate them prior to the end of the session.  Fredis demonstrated good  understanding of the education provided.     See EMR under Patient Instructions for exercises provided 04/06/2023 .    Assessment     Patient with limited lumbar spine mobility. Able to initiate TA activation with cues; however, his awareness is limited.     Fredis Is progressing well towards his goals.   Pt prognosis is Excellent.     Pt will continue to benefit from skilled outpatient physical therapy to address the deficits listed in the problem list box on initial evaluation, provide pt/family education and to maximize pt's level of independence in the home and community environment.     Pt's spiritual, cultural and educational needs considered and pt agreeable to plan of care and goals.     Anticipated barriers to physical therapy: none     Goals:  Short Term Goals: 6 weeks   Patient will be in with home exercise program.   Patient will be able to ambulate on TM x 5 min without increased pain.   Patient will report being able to stand for up to 15 min without increased back pain.      Long Term Goals: 12 weeks   Patient will be independent with progressive home exercise program.   Patient will report tolerating going to the gym at least 2-3 times a week.   Patient will be able to ambulate on TM x 10 min without increased back pain.     Plan     Continue with progressive strength and mobility training    Jean-Claude Cordoba, PT

## 2023-04-20 ENCOUNTER — CLINICAL SUPPORT (OUTPATIENT)
Dept: REHABILITATION | Facility: HOSPITAL | Age: 53
End: 2023-04-20
Payer: MEDICARE

## 2023-04-20 DIAGNOSIS — R53.1 WEAKNESS GENERALIZED: ICD-10-CM

## 2023-04-20 DIAGNOSIS — G89.29 CHRONIC LEFT-SIDED LOW BACK PAIN WITHOUT SCIATICA: ICD-10-CM

## 2023-04-20 DIAGNOSIS — M54.50 CHRONIC LEFT-SIDED LOW BACK PAIN WITHOUT SCIATICA: ICD-10-CM

## 2023-04-20 DIAGNOSIS — N81.89 PELVIC FLOOR WEAKNESS: Primary | ICD-10-CM

## 2023-04-20 DIAGNOSIS — R27.8 COORDINATION ABNORMAL: ICD-10-CM

## 2023-04-20 PROCEDURE — 97110 THERAPEUTIC EXERCISES: CPT | Mod: PN | Performed by: PHYSICAL THERAPIST

## 2023-04-20 NOTE — PROGRESS NOTES
Pelvic Health Physical Therapy   Treatment Note and Progress note     Name: Fredis Ugarte  Clinic Number: [id number]    Therapy Diagnosis:   Encounter Diagnoses   Name Primary?    Pelvic floor weakness Yes    Coordination abnormal     Weakness generalized     Chronic left-sided low back pain without sciatica      Physician: Kinga Lee, *    Visit Date: 4/20/2023    Physician Orders: PT Eval and Treat  Medical Diagnosis from Referral: N81.89 (ICD-10-CM) - Pelvic floor weakness   Evaluation Date: 3/9/2023  Authorization Period Expiration: 3/1/2024  Plan of Care Expiration: 6/1/2023  Progress note: 5/18/2023  Visit # 3/12 Visits authorized: 1/10    Cancelled Visits: 0  No Show Visits: 0    Time In: 1:02 PM  Time Out: 1:59 PM   Total Billable Time: 57 minutes    Precautions: Standard, cancer, and extensive abdominal surgery    Subjective   Pt reports: Patient just returned from vacation. Patient states that he did not sleep well last night. States he has PTSD from the passing of his father. We did discuss counseling.     He was compliant with home exercise program.  Response to previous treatment: none  Functional change: none    Pain in:  4-5/10  back pain  Pain out: 4-5/10 moderate back pain  Location:  back pain at the thoracolumbar junction    Objective     Fredis received therapeutic exercises to develop  strength, endurance, ROM, flexibility, posture, and core stabilization for 57 minutes including: abdominal sets and lumbar spine ROM  see log.   Encouraged him to go to the gym and monitor back pain. Encouraged pec, HS, and hip flexor ROM to improve gait mechanics.          Intervention Eval  04/06/2023 04/20/2023    TherEx Pelvic tilts  2x10 with assist 2x10 with assist   TherEx Bridges  10xs with cues 5xs - unable to lift   TherEx Trunk ROM with ball standing   10xs    TherEx HS ROM seated   3xs each LE    TherEx Glut squeeze - on bolster   3x10   TherEx SAQ   3x10 3#   TherEx Hip adduction with  ball   3x10   TherEx Standing hip extension   2x10 green TB   TherEx LTR   2x10   TherEx HS curls   3x10 green TB   TherEx Hip abd hooklying   3x10 green TB   NeuroRe-ed RUSI  TA activation, unable to get a good view of the pelvic floor        Home Exercises Provided and Patient Education Provided     Education provided:   - posture/body mechanices  Discussed progression of plan of care with patient; educated pt in activity modification; reviewed HEP with pt. Pt demonstrated and verbalized understanding of all instruction and was provided with a handout of HEP (see Patient Instructions).    Written Home Exercises Provided: yes.  Exercises were reviewed and Fredis was able to demonstrate them prior to the end of the session.  Fredis demonstrated good  understanding of the education provided.     See EMR under Patient Instructions for exercises provided 04/06/2023 .    Assessment     Patient with improved mobility today. Encouraged him to progressively lower his lift chair when standing up. Tolerated strength training well. He did spend some time in the pool while on vacation. He is able to work in his garden without significant back pain.     Fredis Is progressing well towards his goals.   Pt prognosis is Excellent.     Pt will continue to benefit from skilled outpatient physical therapy to address the deficits listed in the problem list box on initial evaluation, provide pt/family education and to maximize pt's level of independence in the home and community environment.     Pt's spiritual, cultural and educational needs considered and pt agreeable to plan of care and goals.     Anticipated barriers to physical therapy: none     Goals:  Short Term Goals: 6 weeks   Patient will be in with home exercise program. Goal not met - progressing  Patient will be able to ambulate on TM x 5 min without increased pain. Goal not met - progressing  Patient will report being able to stand for up to 15 min without increased back pain.  Goal not met - progressing     Long Term Goals: 12 weeks   Patient will be independent with progressive home exercise program. Goal not met - progressing  Patient will report tolerating going to the gym at least 2-3 times a week. Goal not met - progressing  Patient will be able to ambulate on TM x 10 min without increased back pain. Goal not met - progressing    Plan     Continue with progressive strength and mobility training. Will attempt treadmill next visit.    Jean-Claude Cordoba, PT

## 2023-04-26 ENCOUNTER — CLINICAL SUPPORT (OUTPATIENT)
Dept: REHABILITATION | Facility: HOSPITAL | Age: 53
End: 2023-04-26
Payer: MEDICARE

## 2023-04-26 DIAGNOSIS — M54.50 CHRONIC LEFT-SIDED LOW BACK PAIN WITHOUT SCIATICA: ICD-10-CM

## 2023-04-26 DIAGNOSIS — G89.29 CHRONIC LEFT-SIDED LOW BACK PAIN WITHOUT SCIATICA: ICD-10-CM

## 2023-04-26 DIAGNOSIS — R27.8 COORDINATION ABNORMAL: ICD-10-CM

## 2023-04-26 DIAGNOSIS — R53.1 WEAKNESS GENERALIZED: ICD-10-CM

## 2023-04-26 DIAGNOSIS — N81.89 PELVIC FLOOR WEAKNESS: Primary | ICD-10-CM

## 2023-04-26 PROCEDURE — 97530 THERAPEUTIC ACTIVITIES: CPT | Mod: PN | Performed by: PHYSICAL THERAPIST

## 2023-04-26 PROCEDURE — 97112 NEUROMUSCULAR REEDUCATION: CPT | Mod: PN | Performed by: PHYSICAL THERAPIST

## 2023-04-26 PROCEDURE — 97140 MANUAL THERAPY 1/> REGIONS: CPT | Mod: PN | Performed by: PHYSICAL THERAPIST

## 2023-04-26 NOTE — PROGRESS NOTES
Pelvic Health Physical Therapy   Treatment Note and Progress note     Name: Fredis Ugarte  Clinic Number: [id number]    Therapy Diagnosis:   Encounter Diagnoses   Name Primary?    Pelvic floor weakness Yes    Coordination abnormal     Weakness generalized     Chronic left-sided low back pain without sciatica      Physician: Kinga Lee, *    Visit Date: 4/26/2023    Physician Orders: PT Eval and Treat  Medical Diagnosis from Referral: N81.89 (ICD-10-CM) - Pelvic floor weakness   Evaluation Date: 3/9/2023  Authorization Period Expiration: 3/1/2024  Plan of Care Expiration: 6/1/2023  Progress note: 5/18/2023  Visit # 4/12 Visits authorized: 3/10    Cancelled Visits: 0  No Show Visits: 0    Time In: 2:35 PM   Time Out: 3:15 PM   Total Billable Time: 40 minutes    Precautions: Standard, cancer, and extensive abdominal surgery    Subjective   Pt reports: patient states that he has been doing 2 suppositories every night. He is frustrated with the need to be on narcotics. Wants to wean off of medical marijuana because of the expense.   States that he lifted on 150lb logs while working in the yard. He states that he may have hurt himself. I did advise him on the fact that that is too heavy.     He was compliant with home exercise program.  Response to previous treatment: none  Functional change: none    Pain in:  4/10  back pain  Pain out: 4/10 moderate back pain  Location:  back pain at the thoracolumbar junction    Objective   Fredis participated in neuromuscular re-education activities to develop Coordination and Down training for 20 minutes including: rehabilitative ultrasound imaging to help visualize pelvic floor muscle contraction and relaxation with kegels and core awareness. Patient with 1 occasion of good core activation; however, pt did not have sensation of this. Poor image of pelvic floor could be secondary to his screen and scar tissue.     Fredis received the following manual therapy techniques:  scar massage over the lower abdomen for 10 minutes including: scar mobilization of abdominal scar - gentle to patients tolerance    Fredis participated in dynamic functional therapeutic activities to improve functional performance for 10  minutes, including:  Education on continued pain management to prevent muscle spasm. Talked of using a squatty potty; however, this position does not seem to help him. Encouraged going to the gym. Advised to allow his body time to rest. Avoid lifting excessive weights.          Intervention Eval  04/06/2023 04/20/2023 04/26/2023    TherEx Pelvic tilts  2x10 with assist 2x10 with assist    TherEx Bridges  10xs with cues 5xs - unable to lift    TherEx Trunk ROM with ball standing   10xs     TherEx HS ROM seated   3xs each LE     TherEx Glut squeeze - on bolster   3x10    TherEx SAQ   3x10 3#    TherEx Hip adduction with ball   3x10    TherEx Standing hip extension   2x10 green TB    TherEx LTR   2x10    TherEx HS curls   3x10 green TB    TherEx Hip abd hooklying   3x10 green TB    NeuroRe-ed RUSI  TA activation, unable to get a good view of the pelvic floor   rehabilitative ultrasound imaging to help visualize pelvic floor muscle contraction and relaxation with kegels and core awareness. Patient with 1 occasion of good core activation; however, pt did not have sensation of this. Poor image of pelvic floor could be secondary to his screen and scar tissue.    ManualTx Scar massage    X    TherAct Bowel habits    Education on continued pain management to prevent muscle spasm. Talked of using a squatty potty; however, this position does not seem to help him. Encouraged going to the gym. Advised to allow his body time to rest. Avoid lifting excessive weights.          Home Exercises Provided and Patient Education Provided     Education provided:   - posture/body mechanices  Discussed progression of plan of care with patient; educated pt in activity modification; reviewed HEP with pt. Pt  demonstrated and verbalized understanding of all instruction and was provided with a handout of HEP (see Patient Instructions).    Written Home Exercises Provided: yes.  Exercises were reviewed and Fredis was able to demonstrate them prior to the end of the session.  Fredis demonstrated good  understanding of the education provided.     See EMR under Patient Instructions for exercises provided 04/06/2023 .    Assessment     Patient with improved ability to perform a glut squeeze today. Still with poor abdominal awareness and contraction. Pain is also a little worse.     Fredis Is progressing well towards his goals.   Pt prognosis is Excellent.     Pt will continue to benefit from skilled outpatient physical therapy to address the deficits listed in the problem list box on initial evaluation, provide pt/family education and to maximize pt's level of independence in the home and community environment.     Pt's spiritual, cultural and educational needs considered and pt agreeable to plan of care and goals.     Anticipated barriers to physical therapy: none     Goals:  Short Term Goals: 6 weeks   Patient will be in with home exercise program. Goal met - 04/26/2023   Patient will be able to ambulate on TM x 5 min without increased pain. Goal not met - progressing  Patient will report being able to stand for up to 15 min without increased back pain. Goal not met - progressing     Long Term Goals: 12 weeks   Patient will be independent with progressive home exercise program. Goal not met - progressing  Patient will report tolerating going to the gym at least 2-3 times a week. Goal not met - progressing  Patient will be able to ambulate on TM x 10 min without increased back pain. Goal not met - progressing    Plan     Continue with progressive strength and mobility training. Will attempt treadmill next visit.    Jean-Claude Cordoba, PT

## 2023-05-03 ENCOUNTER — CLINICAL SUPPORT (OUTPATIENT)
Dept: REHABILITATION | Facility: HOSPITAL | Age: 53
End: 2023-05-03
Payer: MEDICARE

## 2023-05-03 DIAGNOSIS — M54.50 CHRONIC LEFT-SIDED LOW BACK PAIN WITHOUT SCIATICA: ICD-10-CM

## 2023-05-03 DIAGNOSIS — R53.1 WEAKNESS GENERALIZED: ICD-10-CM

## 2023-05-03 DIAGNOSIS — G89.29 CHRONIC LEFT-SIDED LOW BACK PAIN WITHOUT SCIATICA: ICD-10-CM

## 2023-05-03 DIAGNOSIS — N81.89 PELVIC FLOOR WEAKNESS: Primary | ICD-10-CM

## 2023-05-03 DIAGNOSIS — R27.8 COORDINATION ABNORMAL: ICD-10-CM

## 2023-05-03 PROCEDURE — 97110 THERAPEUTIC EXERCISES: CPT | Mod: PN | Performed by: PHYSICAL THERAPIST

## 2023-05-17 ENCOUNTER — CLINICAL SUPPORT (OUTPATIENT)
Dept: REHABILITATION | Facility: HOSPITAL | Age: 53
End: 2023-05-17
Payer: MEDICARE

## 2023-05-17 DIAGNOSIS — N81.89 PELVIC FLOOR WEAKNESS: Primary | ICD-10-CM

## 2023-05-17 DIAGNOSIS — M54.50 CHRONIC LEFT-SIDED LOW BACK PAIN WITHOUT SCIATICA: ICD-10-CM

## 2023-05-17 DIAGNOSIS — R27.8 COORDINATION ABNORMAL: ICD-10-CM

## 2023-05-17 DIAGNOSIS — G89.29 CHRONIC LEFT-SIDED LOW BACK PAIN WITHOUT SCIATICA: ICD-10-CM

## 2023-05-17 DIAGNOSIS — R53.1 WEAKNESS GENERALIZED: ICD-10-CM

## 2023-05-17 PROCEDURE — 97110 THERAPEUTIC EXERCISES: CPT | Mod: PN | Performed by: PHYSICAL THERAPIST

## 2023-05-17 NOTE — PROGRESS NOTES
Pelvic Health Physical Therapy   Treatment Note and Progress note     Name: Fredis Ugarte  Clinic Number: [id number]    Therapy Diagnosis:   Encounter Diagnoses   Name Primary?    Pelvic floor weakness Yes    Coordination abnormal     Weakness generalized     Chronic left-sided low back pain without sciatica      Physician: Kinga Lee, *    Visit Date: 5/17/2023    Physician Orders: PT Eval and Treat  Medical Diagnosis from Referral: N81.89 (ICD-10-CM) - Pelvic floor weakness   Evaluation Date: 3/9/2023  Authorization Period Expiration: 3/1/2024  Plan of Care Expiration: 6/1/2023  Progress note: 6/14/2023  Visit # 6/12 Visits authorized: 5/10    Cancelled Visits: 0  No Show Visits: 0    Time In: 2:35 PM   Time Out: 3:15 PM   Total Billable Time: 40  minutes    Precautions: Standard, cancer, and extensive abdominal surgery    Subjective   Pt reports: He is off the Calcium. Had a lot of blood with bowel movement. Sees oncology next month on June 16th. Will start Testosterone shots every month.    He was compliant with home exercise program.  Response to previous treatment: none  Functional change: none    Pain in:  4/10  back pain  Pain out: 6/10 moderate back pain  Location:  back pain at the thoracolumbar junction    Objective   Fredis received therapeutic exercises to develop  strength, endurance, posture, and core stabilization for 40 minutes including:  strength and endurance training       Intervention Eval  04/06/2023 04/20/2023 04/26/2023 05/03/2023 05/17/2023    TherEx Pelvic tilts  2x10 with assist 2x10 with assist   In quadriped 5xs   TherEx Bridges  10xs with cues 5xs - unable to lift   5xs with pelvic pain   TherEx Trunk ROM with ball standing   10xs       TherEx HS ROM seated   3xs each LE       TherEx Glut squeeze - on bolster   3x10      TherEx SAQ   3x10 3#      TherEx Hip adduction with ball   3x10      TherEx Standing hip extension   2x10 green TB      TherEx LTR   2x10      TherEx  HS curls   3x10 green TB      TherEx Hip abd hooklying   3x10 green TB      NeuroRe-ed RUSI  TA activation, unable to get a good view of the pelvic floor   rehabilitative ultrasound imaging to help visualize pelvic floor muscle contraction and relaxation with kegels and core awareness. Patient with 1 occasion of good core activation; however, pt did not have sensation of this. Poor image of pelvic floor could be secondary to his screen and scar tissue.      ManualTx Scar massage    X      TherAct Bowel habits    Education on continued pain management to prevent muscle spasm. Talked of using a squatty potty; however, this position does not seem to help him. Encouraged going to the gym. Advised to allow his body time to rest. Avoid lifting excessive weights.        TherEx TM     TM x 12 min - 1.1-2.0    TherEx Scapular retraction     Green t-band = 2x15    TherEx Bilateral external rotation arms     Green t-band = 2x15    TherEx LAQ     4# 3x10     TherEx Shoulder shrugs     5# 3x10    TherEx Bicep curls     5# 3x10    TherEx Baldev pose      x   TherEx SKTC      x   TherEx Piriformis stretch      x   TherEx DKTC with ball      x       Home Exercises Provided and Patient Education Provided     Education provided:   - posture/body mechanices  Discussed progression of plan of care with patient; educated pt in activity modification; reviewed HEP with pt. Pt demonstrated and verbalized understanding of all instruction and was provided with a handout of HEP (see Patient Instructions).    Written Home Exercises Provided: yes.  Exercises were reviewed and Fredis was able to demonstrate them prior to the end of the session.  Fredis demonstrated good  understanding of the education provided.     See EMR under Patient Instructions for exercises provided 04/06/2023 .    Assessment     Increased pain with lumbar spine ROM today.     Fredis Is progressing well towards his goals.   Pt prognosis is Excellent.     Pt will continue to  benefit from skilled outpatient physical therapy to address the deficits listed in the problem list box on initial evaluation, provide pt/family education and to maximize pt's level of independence in the home and community environment.     Pt's spiritual, cultural and educational needs considered and pt agreeable to plan of care and goals.     Anticipated barriers to physical therapy: none     Goals:  Short Term Goals: 6 weeks   Patient will be in with home exercise program. Goal met - 04/26/2023   Patient will be able to ambulate on TM x 5 min without increased pain. Goal met - 05/17/2023   Patient will report being able to stand for up to 15 min without increased back pain. Goal not met - progressing     Long Term Goals: 12 weeks   Patient will be independent with progressive home exercise program. Goal not met - progressing  Patient will report tolerating going to the gym at least 2-3 times a week. Goal not met - progressing  Patient will be able to ambulate on TM x 10 min without increased back pain. Goal met - 05/17/2023   Plan     Continue with progressive strength and mobility training.   Jean-Claude Cordoba, PT

## 2023-05-24 ENCOUNTER — CLINICAL SUPPORT (OUTPATIENT)
Dept: REHABILITATION | Facility: HOSPITAL | Age: 53
End: 2023-05-24
Payer: MEDICARE

## 2023-05-24 DIAGNOSIS — G89.29 CHRONIC LEFT-SIDED LOW BACK PAIN WITHOUT SCIATICA: ICD-10-CM

## 2023-05-24 DIAGNOSIS — N81.89 PELVIC FLOOR WEAKNESS: Primary | ICD-10-CM

## 2023-05-24 DIAGNOSIS — R53.1 WEAKNESS GENERALIZED: ICD-10-CM

## 2023-05-24 DIAGNOSIS — M54.50 CHRONIC LEFT-SIDED LOW BACK PAIN WITHOUT SCIATICA: ICD-10-CM

## 2023-05-24 DIAGNOSIS — R27.8 COORDINATION ABNORMAL: ICD-10-CM

## 2023-05-24 PROCEDURE — 97110 THERAPEUTIC EXERCISES: CPT | Mod: PN | Performed by: PHYSICAL THERAPIST

## 2023-05-24 NOTE — PROGRESS NOTES
Pelvic Health Physical Therapy   Treatment Note     Name: Fredis Ugarte  Clinic Number: [id number]    Therapy Diagnosis:   Encounter Diagnoses   Name Primary?    Pelvic floor weakness Yes    Coordination abnormal     Chronic left-sided low back pain without sciatica     Weakness generalized      Physician: Kinga Lee, *    Visit Date: 5/24/2023    Physician Orders: PT Eval and Treat  Medical Diagnosis from Referral: N81.89 (ICD-10-CM) - Pelvic floor weakness   Evaluation Date: 3/9/2023  Authorization Period Expiration: 3/1/2024  Plan of Care Expiration: 6/1/2023  Progress note: 6/14/2023  Visit # 7/12 Visits authorized: 6/10    Cancelled Visits: 0  No Show Visits: 0    Time In: 2:35 PM   Time Out: 3:18 PM   Total Billable Time: 43  minutes    Precautions: Standard, cancer, and extensive abdominal surgery    Subjective   Pt reports: He went to the gym 1 day this week. He has been moving his bowels better. He is off of Calcium. Still with back pain that is worse with movement.     He was compliant with home exercise program.  Response to previous treatment: none  Functional change: none    Pain in:  3-4/10  back pain  Pain out: 6-7/10 moderate back pain  Location:  back pain at the thoracolumbar junction    Objective   Fredis received therapeutic exercises to develop  strength, endurance, posture, and core stabilization for 40 minutes including:  strength and endurance training       Intervention Eval  04/06/2023 04/20/2023 04/26/2023 05/03/2023 05/17/2023 05/24/2023    TherEx Pelvic tilts  2x10 with assist 2x10 with assist   In quadriped 5xs 5xs supine   TherEx Bridges  10xs with cues 5xs - unable to lift   5xs with pelvic pain 5xs   TherEx Trunk ROM with ball standing   10xs        TherEx HS ROM seated   3xs each LE        TherEx Glut squeeze - on bolster   3x10       TherEx SAQ   3x10 3#    3x10 4#   TherEx Hip adduction with ball   3x10    3x10    TherEx Standing hip extension   2x10 green TB        TherEx LTR   2x10    3xs with assistance   TherEx HS curls   3x10 green TB       TherEx Hip abd hooklying   3x10 green TB    Green TB 3x10   NeuroRe-ed RUSI  TA activation, unable to get a good view of the pelvic floor  rehabilitative ultrasound imaging to help visualize pelvic floor muscle contraction and relaxation with kegels and core awareness. Patient with 1 occasion of good core activation; however, pt did not have sensation of this. Poor image of pelvic floor could be secondary to his screen and scar tissue.       ManualTx Scar massage    X       TherAct Bowel habits    Education on continued pain management to prevent muscle spasm. Talked of using a squatty potty; however, this position does not seem to help him. Encouraged going to the gym. Advised to allow his body time to rest. Avoid lifting excessive weights.      Bowels are moving well   TherEx TM     TM x 12 min - 1.1-2.0     TherEx Scapular retraction     Green t-band = 2x15     TherEx Bilateral external rotation arms     Green t-band = 2x15     TherEx LAQ     4# 3x10      TherEx Shoulder shrugs     5# 3x10     TherEx Bicep curls     5# 3x10     TherEx Baldev pose      x    TherEx SKTC      x    TherEx Piriformis stretch      x    TherEx DKTC with ball      x        Home Exercises Provided and Patient Education Provided     Education provided:   - posture/body mechanices  Discussed progression of plan of care with patient; educated pt in activity modification; reviewed HEP with pt. Pt demonstrated and verbalized understanding of all instruction and was provided with a handout of HEP (see Patient Instructions).    Written Home Exercises Provided: yes.  Exercises were reviewed and Fredis was able to demonstrate them prior to the end of the session.  Fredis demonstrated good  understanding of the education provided.     See EMR under Patient Instructions for exercises provided 04/06/2023 .    Assessment     Increased pain with lumbar spine ROM today. He  has been compliant with home exercise program and has been to the gym. He tolerated walking and UE strength training well.     Fredis Is progressing well towards his goals.   Pt prognosis is Excellent.     Pt will continue to benefit from skilled outpatient physical therapy to address the deficits listed in the problem list box on initial evaluation, provide pt/family education and to maximize pt's level of independence in the home and community environment.     Pt's spiritual, cultural and educational needs considered and pt agreeable to plan of care and goals.     Anticipated barriers to physical therapy: none     Goals:  Short Term Goals: 6 weeks   Patient will be in with home exercise program. Goal met - 04/26/2023   Patient will be able to ambulate on TM x 5 min without increased pain. Goal met - 05/17/2023   Patient will report being able to stand for up to 15 min without increased back pain. Goal not met - progressing     Long Term Goals: 12 weeks   Patient will be independent with progressive home exercise program. Goal not met - progressing  Patient will report tolerating going to the gym at least 2-3 times a week. Goal not met - progressing  Patient will be able to ambulate on TM x 10 min without increased back pain. Goal met - 05/17/2023   Plan     Continue with progressive strength and mobility training.   Jean-Claude Cordoba, PT

## 2023-05-31 ENCOUNTER — CLINICAL SUPPORT (OUTPATIENT)
Dept: REHABILITATION | Facility: HOSPITAL | Age: 53
End: 2023-05-31
Payer: MEDICARE

## 2023-05-31 DIAGNOSIS — N81.89 PELVIC FLOOR WEAKNESS: Primary | ICD-10-CM

## 2023-05-31 DIAGNOSIS — M54.50 CHRONIC LEFT-SIDED LOW BACK PAIN WITHOUT SCIATICA: ICD-10-CM

## 2023-05-31 DIAGNOSIS — G89.29 CHRONIC LEFT-SIDED LOW BACK PAIN WITHOUT SCIATICA: ICD-10-CM

## 2023-05-31 DIAGNOSIS — R27.8 COORDINATION ABNORMAL: ICD-10-CM

## 2023-05-31 DIAGNOSIS — R53.1 WEAKNESS GENERALIZED: ICD-10-CM

## 2023-05-31 PROCEDURE — 97140 MANUAL THERAPY 1/> REGIONS: CPT | Mod: PN | Performed by: PHYSICAL THERAPIST

## 2023-05-31 NOTE — PROGRESS NOTES
Pelvic Health Physical Therapy   Treatment Note     Name: Fredis Ugarte  Clinic Number: [id number]    Therapy Diagnosis:   Encounter Diagnoses   Name Primary?    Pelvic floor weakness Yes    Coordination abnormal     Chronic left-sided low back pain without sciatica     Weakness generalized      Physician: Kinga Lee, *    Visit Date: 5/31/2023    Physician Orders: PT Eval and Treat  Medical Diagnosis from Referral: N81.89 (ICD-10-CM) - Pelvic floor weakness   Evaluation Date: 3/9/2023  Authorization Period Expiration: 3/1/2024  Plan of Care Expiration: 6/1/2023  Progress note: 6/14/2023  Visit # 7/12 Visits authorized: 6/10    Cancelled Visits: 0  No Show Visits: 0    Time In: 2:35 PM   Time Out: 3:25 PM    Total Billable Time: 50  minutes    Precautions: Standard, cancer, and extensive abdominal surgery    Subjective   Pt reports: He states that he went fishing on Monday. He felt something pop in his left hip. He states they hit a wave funny. He has had increased pain since then.     He was compliant with home exercise program.  Response to previous treatment: none  Functional change: none    Pain in:  4-5/10  back pain  Pain out: 3/10 moderate back pain  Location:  back pain at the thoracolumbar junction    Objective   Fredis received the following manual therapy techniques: to develop flexibility for 50 minutes including: soft tissue mobilization of the left psoas, quadratus lumborum, left lower lumbar paraspinals, and left hip rotation, single knee to chest and piriformis PROM.     Intervention Eval  04/20/2023 04/26/2023 05/03/2023 05/17/2023 05/24/2023 05/31/2023    TherEx Pelvic tilts  2x10 with assist   In quadriped 5xs 5xs supine    TherEx Bridges  5xs - unable to lift   5xs with pelvic pain 5xs    TherEx Trunk ROM with ball standing           TherEx HS ROM seated           TherEx Glut squeeze - on bolster  3x10        TherEx SAQ  3x10 3#    3x10 4#    TherEx Hip adduction with ball  3x10     3x10     TherEx Standing hip extension  2x10 green TB        TherEx LTR  2x10    3xs with assistance    TherEx HS curls  3x10 green TB        TherEx Hip abd hooklying  3x10 green TB    Green TB 3x10    NeuroRe-ed RUSI   rehabilitative ultrasound imaging to help visualize pelvic floor muscle contraction and relaxation with kegels and core awareness. Patient with 1 occasion of good core activation; however, pt did not have sensation of this. Poor image of pelvic floor could be secondary to his screen and scar tissue.        ManualTx Scar massage   X        TherAct Bowel habits   Education on continued pain management to prevent muscle spasm. Talked of using a squatty potty; however, this position does not seem to help him. Encouraged going to the gym. Advised to allow his body time to rest. Avoid lifting excessive weights.      Bowels are moving well    TherEx TM    TM x 12 min - 1.1-2.0      TherEx Scapular retraction    Green t-band = 2x15      TherEx Bilateral external rotation arms    Green t-band = 2x15      TherEx LAQ    4# 3x10       TherEx Shoulder shrugs    5# 3x10      TherEx Bicep curls    5# 3x10      TherEx Baldev pose     x     TherEx SKTC     x     TherEx Piriformis stretch     x     TherEx DKTC with ball     x         Home Exercises Provided and Patient Education Provided     Education provided:   - posture/body mechanices  Discussed progression of plan of care with patient; educated pt in activity modification; reviewed HEP with pt. Pt demonstrated and verbalized understanding of all instruction and was provided with a handout of HEP (see Patient Instructions).    Written Home Exercises Provided: yes.  Exercises were reviewed and Fredis was able to demonstrate them prior to the end of the session.  Fredis demonstrated good  understanding of the education provided.     See EMR under Patient Instructions for exercises provided 04/06/2023 .    Assessment   Improved pain after physical therapist session  today. We also discussed him seeing a massage therapist.     Fredis Is progressing well towards his goals.   Pt prognosis is Excellent.     Pt will continue to benefit from skilled outpatient physical therapy to address the deficits listed in the problem list box on initial evaluation, provide pt/family education and to maximize pt's level of independence in the home and community environment.     Pt's spiritual, cultural and educational needs considered and pt agreeable to plan of care and goals.     Anticipated barriers to physical therapy: none     Goals:  Short Term Goals: 6 weeks   Patient will be in with home exercise program. Goal met - 04/26/2023   Patient will be able to ambulate on TM x 5 min without increased pain. Goal met - 05/17/2023   Patient will report being able to stand for up to 15 min without increased back pain. Goal not met - progressing     Long Term Goals: 12 weeks   Patient will be independent with progressive home exercise program. Goal not met - progressing  Patient will report tolerating going to the gym at least 2-3 times a week. Goal not met - progressing  Patient will be able to ambulate on TM x 10 min without increased back pain. Goal met - 05/17/2023   Plan     Continue with progressive strength and mobility training.   Jean-Claude Cordoba, PT

## 2023-06-08 ENCOUNTER — CLINICAL SUPPORT (OUTPATIENT)
Dept: REHABILITATION | Facility: HOSPITAL | Age: 53
End: 2023-06-08
Attending: INTERNAL MEDICINE
Payer: MEDICARE

## 2023-06-08 DIAGNOSIS — M54.50 CHRONIC LEFT-SIDED LOW BACK PAIN WITHOUT SCIATICA: ICD-10-CM

## 2023-06-08 DIAGNOSIS — R53.1 WEAKNESS GENERALIZED: ICD-10-CM

## 2023-06-08 DIAGNOSIS — G89.29 CHRONIC LEFT-SIDED LOW BACK PAIN WITHOUT SCIATICA: ICD-10-CM

## 2023-06-08 DIAGNOSIS — N81.89 PELVIC FLOOR WEAKNESS: Primary | ICD-10-CM

## 2023-06-08 DIAGNOSIS — R27.8 COORDINATION ABNORMAL: ICD-10-CM

## 2023-06-08 PROCEDURE — 97140 MANUAL THERAPY 1/> REGIONS: CPT | Mod: PN | Performed by: PHYSICAL THERAPIST

## 2023-06-09 NOTE — PLAN OF CARE
Pelvic Health Physical Therapy   Treatment Note and Updated POC     Name: Fredis Ugarte  Clinic Number: [id number]    Therapy Diagnosis:   Encounter Diagnoses   Name Primary?    Pelvic floor weakness Yes    Coordination abnormal     Chronic left-sided low back pain without sciatica     Weakness generalized      Physician: Kinga Lee, *    Visit Date: 6/8/2023    Physician Orders: PT Eval and Treat  Medical Diagnosis from Referral: N81.89 (ICD-10-CM) - Pelvic floor weakness   Evaluation Date: 3/9/2023  Authorization Period Expiration: 3/1/2024  Plan of Care Expiration: 6/1/2023  Progress note: 6/14/2023  Visit # 9/12 Visits authorized: 8/10    Cancelled Visits: 0  No Show Visits: 0    Time In: 3:25 PM  Time Out: 4:00 PM  Total Billable Time: 35  minutes    Precautions: Standard, cancer, and extensive abdominal surgery    Subjective   Pt reports: He states that his hip felt a lot better after last session.     He was compliant with home exercise program.  Response to previous treatment: none  Functional change: none    Pain in:  4-5/10  back pain  Pain out: 4/10 back pain  Location:  back pain at the thoracolumbar junction    Objective   Fredis received the following manual therapy techniques: to develop flexibility for 35 minutes including: soft tissue mobilization of the left psoas, quadratus lumborum, left lower lumbar paraspinals, and bilateral hip rotation, bilateral single knee to chest and bilateral piriformis PROM.     Intervention Eval  04/26/2023 05/03/2023 05/17/2023 05/24/2023 05/31/2023    TherEx Pelvic tilts    In quadriped 5xs 5xs supine    TherEx Bridges    5xs with pelvic pain 5xs    TherEx Trunk ROM with ball standing          TherEx HS ROM seated          TherEx Glut squeeze - on bolster         TherEx SAQ     3x10 4#    TherEx Hip adduction with ball     3x10     TherEx Standing hip extension         TherEx LTR     3xs with assistance    TherEx HS curls         TherEx Hip abd  hooklying     Green TB 3x10    NeuroRe-ed RUSI  rehabilitative ultrasound imaging to help visualize pelvic floor muscle contraction and relaxation with kegels and core awareness. Patient with 1 occasion of good core activation; however, pt did not have sensation of this. Poor image of pelvic floor could be secondary to his screen and scar tissue.        ManualTx Scar massage  X        TherAct Bowel habits  Education on continued pain management to prevent muscle spasm. Talked of using a squatty potty; however, this position does not seem to help him. Encouraged going to the gym. Advised to allow his body time to rest. Avoid lifting excessive weights.      Bowels are moving well    TherEx TM   TM x 12 min - 1.1-2.0      TherEx Scapular retraction   Green t-band = 2x15      TherEx Bilateral external rotation arms   Green t-band = 2x15      TherEx LAQ   4# 3x10       TherEx Shoulder shrugs   5# 3x10      TherEx Bicep curls   5# 3x10      TherEx Baldev pose    x     TherEx SKTC    x     TherEx Piriformis stretch    x     TherEx DKTC with ball    x       Abdominal strength: TA = 3/5 with mod cues, able to perform supine to sit moderate independent with significant effort and use of upper and lower extremities.     Home Exercises Provided and Patient Education Provided     Education provided:   - posture/body mechanices  Discussed progression of plan of care with patient; educated pt in activity modification; reviewed HEP with pt. Pt demonstrated and verbalized understanding of all instruction and was provided with a handout of HEP (see Patient Instructions).    Written Home Exercises Provided: yes.  Exercises were reviewed and Fredis was able to demonstrate them prior to the end of the session.  Fredis demonstrated good  understanding of the education provided.     See EMR under Patient Instructions for exercises provided 04/06/2023 .    Assessment   Improved pain after physical therapist session today. He also voices  going to the gym this past week.     Fredis Is progressing well towards his goals.   Pt prognosis is Excellent.     Pt will continue to benefit from skilled outpatient physical therapy to address the deficits listed in the problem list box on initial evaluation, provide pt/family education and to maximize pt's level of independence in the home and community environment.     Pt's spiritual, cultural and educational needs considered and pt agreeable to plan of care and goals.     Anticipated barriers to physical therapy: none     Goals:  Short Term Goals:   Patient will be in with home exercise program. Goal met - 04/26/2023   Patient will be able to ambulate on TM x 5 min without increased pain. Goal met - 05/17/2023   Patient will report being able to stand for up to 15 min without increased back pain. Goal met - 6/8/2023     Long Term Goals:   Patient will be independent with progressive home exercise program. Goal met - 6/8/2023   Patient will report tolerating going to the gym at least 2-3 times a week. Goal not met - progressing  Patient will be able to ambulate on TM x 10 min without increased back pain. Goal met - 05/17/2023     New Long Term Goals: 6 weeks  Patient will be able to tolerate riding in his boat without left hip pain.   Patient will demonstrate bilateral hip extension to 10* to allow for improved gait mechanics.   Patient will deny back pain.  Patient will demonstrate lumbar spine ROM without pain.     Plan     Continue with progressive strength and mobility training 1x a week for 6 additional weeks.       Jean-Claude Cordoba, PT

## 2023-06-09 NOTE — PROGRESS NOTES
Pelvic Health Physical Therapy   Treatment Note and Updated POC     Name: Fredis Ugarte  Clinic Number: [id number]    Therapy Diagnosis:   Encounter Diagnoses   Name Primary?    Pelvic floor weakness Yes    Coordination abnormal     Chronic left-sided low back pain without sciatica     Weakness generalized      Physician: Kinga Lee, *    Visit Date: 6/8/2023    Physician Orders: PT Eval and Treat  Medical Diagnosis from Referral: N81.89 (ICD-10-CM) - Pelvic floor weakness   Evaluation Date: 3/9/2023  Authorization Period Expiration: 3/1/2024  Plan of Care Expiration: 6/1/2023  Progress note: 6/14/2023  Visit # 9/12 Visits authorized: 8/10    Cancelled Visits: 0  No Show Visits: 0    Time In: 3:25 PM  Time Out: 4:00 PM  Total Billable Time: 35  minutes    Precautions: Standard, cancer, and extensive abdominal surgery    Subjective   Pt reports: He states that his hip felt a lot better after last session.     He was compliant with home exercise program.  Response to previous treatment: none  Functional change: none    Pain in:  4-5/10  back pain  Pain out: 4/10 back pain  Location:  back pain at the thoracolumbar junction    Objective   Fredis received the following manual therapy techniques: to develop flexibility for 35 minutes including: soft tissue mobilization of the left psoas, quadratus lumborum, left lower lumbar paraspinals, and bilateral hip rotation, bilateral single knee to chest and bilateral piriformis PROM.     Intervention Eval  04/26/2023 05/03/2023 05/17/2023 05/24/2023 05/31/2023    TherEx Pelvic tilts    In quadriped 5xs 5xs supine    TherEx Bridges    5xs with pelvic pain 5xs    TherEx Trunk ROM with ball standing          TherEx HS ROM seated          TherEx Glut squeeze - on bolster         TherEx SAQ     3x10 4#    TherEx Hip adduction with ball     3x10     TherEx Standing hip extension         TherEx LTR     3xs with assistance    TherEx HS curls         TherEx Hip abd  hooklying     Green TB 3x10    NeuroRe-ed RUSI  rehabilitative ultrasound imaging to help visualize pelvic floor muscle contraction and relaxation with kegels and core awareness. Patient with 1 occasion of good core activation; however, pt did not have sensation of this. Poor image of pelvic floor could be secondary to his screen and scar tissue.        ManualTx Scar massage  X        TherAct Bowel habits  Education on continued pain management to prevent muscle spasm. Talked of using a squatty potty; however, this position does not seem to help him. Encouraged going to the gym. Advised to allow his body time to rest. Avoid lifting excessive weights.      Bowels are moving well    TherEx TM   TM x 12 min - 1.1-2.0      TherEx Scapular retraction   Green t-band = 2x15      TherEx Bilateral external rotation arms   Green t-band = 2x15      TherEx LAQ   4# 3x10       TherEx Shoulder shrugs   5# 3x10      TherEx Bicep curls   5# 3x10      TherEx Baldev pose    x     TherEx SKTC    x     TherEx Piriformis stretch    x     TherEx DKTC with ball    x       Abdominal strength: TA = 3/5 with mod cues, able to perform supine to sit moderate independent with significant effort and use of upper and lower extremities.     Home Exercises Provided and Patient Education Provided     Education provided:   - posture/body mechanices  Discussed progression of plan of care with patient; educated pt in activity modification; reviewed HEP with pt. Pt demonstrated and verbalized understanding of all instruction and was provided with a handout of HEP (see Patient Instructions).    Written Home Exercises Provided: yes.  Exercises were reviewed and Fredis was able to demonstrate them prior to the end of the session.  Fredis demonstrated good  understanding of the education provided.     See EMR under Patient Instructions for exercises provided 04/06/2023 .    Assessment   Improved pain after physical therapist session today. He also voices  going to the gym this past week.     Fredis Is progressing well towards his goals.   Pt prognosis is Excellent.     Pt will continue to benefit from skilled outpatient physical therapy to address the deficits listed in the problem list box on initial evaluation, provide pt/family education and to maximize pt's level of independence in the home and community environment.     Pt's spiritual, cultural and educational needs considered and pt agreeable to plan of care and goals.     Anticipated barriers to physical therapy: none     Goals:  Short Term Goals:   Patient will be in with home exercise program. Goal met - 04/26/2023   Patient will be able to ambulate on TM x 5 min without increased pain. Goal met - 05/17/2023   Patient will report being able to stand for up to 15 min without increased back pain. Goal met - 6/8/2023     Long Term Goals:   Patient will be independent with progressive home exercise program. Goal met - 6/8/2023   Patient will report tolerating going to the gym at least 2-3 times a week. Goal not met - progressing  Patient will be able to ambulate on TM x 10 min without increased back pain. Goal met - 05/17/2023     New Long Term Goals: 6 weeks  Patient will be able to tolerate riding in his boat without left hip pain.   Patient will demonstrate bilateral hip extension to 10* to allow for improved gait mechanics.   Patient will deny back pain.  Patient will demonstrate lumbar spine ROM without pain.     Plan     Continue with progressive strength and mobility training.   Jean-Claude Cordoba, PT

## 2023-06-14 ENCOUNTER — CLINICAL SUPPORT (OUTPATIENT)
Dept: REHABILITATION | Facility: HOSPITAL | Age: 53
End: 2023-06-14
Attending: INTERNAL MEDICINE
Payer: MEDICARE

## 2023-06-14 DIAGNOSIS — R27.8 COORDINATION ABNORMAL: ICD-10-CM

## 2023-06-14 DIAGNOSIS — M54.50 CHRONIC LEFT-SIDED LOW BACK PAIN WITHOUT SCIATICA: ICD-10-CM

## 2023-06-14 DIAGNOSIS — G89.29 CHRONIC LEFT-SIDED LOW BACK PAIN WITHOUT SCIATICA: ICD-10-CM

## 2023-06-14 DIAGNOSIS — R53.1 WEAKNESS GENERALIZED: ICD-10-CM

## 2023-06-14 DIAGNOSIS — N81.89 PELVIC FLOOR WEAKNESS: Primary | ICD-10-CM

## 2023-06-14 PROCEDURE — 97110 THERAPEUTIC EXERCISES: CPT | Mod: PN | Performed by: PHYSICAL THERAPIST

## 2023-06-14 PROCEDURE — 97140 MANUAL THERAPY 1/> REGIONS: CPT | Mod: PN | Performed by: PHYSICAL THERAPIST

## 2023-06-14 NOTE — PROGRESS NOTES
Pelvic Health Physical Therapy   Treatment Note      Name: Fredis Ugarte  Clinic Number: [id number]    Therapy Diagnosis:   Encounter Diagnoses   Name Primary?    Pelvic floor weakness Yes    Chronic left-sided low back pain without sciatica     Weakness generalized     Coordination abnormal      Physician: Kinga Lee, *    Visit Date: 6/14/2023    Physician Orders: PT Eval and Treat  Medical Diagnosis from Referral: N81.89 (ICD-10-CM) - Pelvic floor weakness   Evaluation Date: 3/9/2023  Authorization Period Expiration: 3/1/2024  Plan of Care Expiration: 6/1/2023  Progress note: 6/14/2023  Visit # 10/12 Visits authorized: 9/10    Cancelled Visits: 0  No Show Visits: 0    Time In: 2:33 PM   Time Out: 3:18 PM   Total Billable Time: 45 minutes    Precautions: Standard, cancer, and extensive abdominal surgery    Subjective   Pt reports: He states that his hip felt a lot better after last session.     He was compliant with home exercise program.  Response to previous treatment: none  Functional change: none    Pain in:  4-5/10  back pain  Pain out: 4/10 back pain  Location:  back pain at the thoracolumbar junction    Objective   Fredis received the following manual therapy techniques: to develop flexibility for 30 minutes including: soft tissue mobilization of the left psoas, quadratus lumborum, left lower lumbar paraspinals, and bilateral hip rotation, bilateral single knee to chest and bilateral piriformis PROM.     Fredis received therapeutic exercises to develop  strength, endurance, posture, and core stabilization for 15 minutes including: education on lower extremity strength training with wall squats, sit to stand, and gym exercises for lower extremity strength training. Weaker on the Left lower extremity with static standing, left hip flexion has improved, complaints of pain in the right lateral malleolus.     Intervention Eval  04/26/2023 05/03/2023 05/17/2023 05/24/2023 05/31/2023 06/14/2023     TherEx Pelvic tilts    In quadriped 5xs 5xs supine     TherEx Bridges    5xs with pelvic pain 5xs     TherEx Trunk ROM with ball standing           TherEx HS ROM seated           TherEx Glut squeeze - on bolster          TherEx SAQ     3x10 4#     TherEx Hip adduction with ball     3x10      TherEx Standing hip extension          TherEx LTR     3xs with assistance     TherEx HS curls          TherEx Hip abd hooklying     Green TB 3x10     NeuroRe-ed RUSI  rehabilitative ultrasound imaging to help visualize pelvic floor muscle contraction and relaxation with kegels and core awareness. Patient with 1 occasion of good core activation; however, pt did not have sensation of this. Poor image of pelvic floor could be secondary to his screen and scar tissue.         ManualTx Scar massage  X         TherAct Bowel habits  Education on continued pain management to prevent muscle spasm. Talked of using a squatty potty; however, this position does not seem to help him. Encouraged going to the gym. Advised to allow his body time to rest. Avoid lifting excessive weights.      Bowels are moving well     TherEx TM   TM x 12 min - 1.1-2.0       TherEx Scapular retraction   Green t-band = 2x15       TherEx Bilateral external rotation arms   Green t-band = 2x15       TherEx LAQ   4# 3x10        TherEx Shoulder shrugs   5# 3x10       TherEx Bicep curls   5# 3x10       TherEx Baldev pose    x      TherEx SKTC    x      TherEx Piriformis stretch    x      TherEx DKTC with ball    x      TherEx Wall squats/sit-stand       5xs with cues     Home Exercises Provided and Patient Education Provided     Education provided:   - posture/body mechanices  Discussed progression of plan of care with patient; educated pt in activity modification; reviewed HEP with pt. Pt demonstrated and verbalized understanding of all instruction and was provided with a handout of HEP (see Patient Instructions).    Written Home Exercises Provided: yes.  Exercises  were reviewed and Fredis was able to demonstrate them prior to the end of the session.  Fredis demonstrated good  understanding of the education provided.     See EMR under Patient Instructions for exercises provided 04/06/2023 .    Assessment   Improved pain after physical therapist session today. He also voices going to the gym this past week.     Fredis Is progressing well towards his goals.   Pt prognosis is Excellent.     Pt will continue to benefit from skilled outpatient physical therapy to address the deficits listed in the problem list box on initial evaluation, provide pt/family education and to maximize pt's level of independence in the home and community environment.     Pt's spiritual, cultural and educational needs considered and pt agreeable to plan of care and goals.     Anticipated barriers to physical therapy: none     Goals:  Short Term Goals:   Patient will be in with home exercise program. Goal met - 04/26/2023   Patient will be able to ambulate on TM x 5 min without increased pain. Goal met - 05/17/2023   Patient will report being able to stand for up to 15 min without increased back pain. Goal met - 6/8/2023     Long Term Goals:   Patient will be independent with progressive home exercise program. Goal met - 6/8/2023   Patient will report tolerating going to the gym at least 2-3 times a week. Goal not met - progressing  Patient will be able to ambulate on TM x 10 min without increased back pain. Goal met - 05/17/2023     New Long Term Goals: 6 weeks  Patient will be able to tolerate riding in his boat without left hip pain.   Patient will demonstrate bilateral hip extension to 10* to allow for improved gait mechanics.   Patient will deny back pain.  Patient will demonstrate lumbar spine ROM without pain.     Plan     Continue with progressive strength and mobility training.   Jean-Claude Cordoba, PT

## 2023-06-21 ENCOUNTER — CLINICAL SUPPORT (OUTPATIENT)
Dept: REHABILITATION | Facility: HOSPITAL | Age: 53
End: 2023-06-21
Attending: INTERNAL MEDICINE
Payer: MEDICARE

## 2023-06-21 DIAGNOSIS — G89.29 CHRONIC LEFT-SIDED LOW BACK PAIN WITHOUT SCIATICA: ICD-10-CM

## 2023-06-21 DIAGNOSIS — M54.50 CHRONIC LEFT-SIDED LOW BACK PAIN WITHOUT SCIATICA: ICD-10-CM

## 2023-06-21 DIAGNOSIS — R27.8 COORDINATION ABNORMAL: ICD-10-CM

## 2023-06-21 DIAGNOSIS — N81.89 PELVIC FLOOR WEAKNESS: Primary | ICD-10-CM

## 2023-06-21 DIAGNOSIS — R53.1 WEAKNESS GENERALIZED: ICD-10-CM

## 2023-06-21 PROCEDURE — 97110 THERAPEUTIC EXERCISES: CPT | Mod: PN | Performed by: PHYSICAL THERAPIST

## 2023-06-21 PROCEDURE — 97140 MANUAL THERAPY 1/> REGIONS: CPT | Mod: PN | Performed by: PHYSICAL THERAPIST

## 2023-06-21 NOTE — PROGRESS NOTES
Pelvic Health Physical Therapy   Treatment Note      Name: Fredis Ugarte  Clinic Number: [id number]    Therapy Diagnosis:   Encounter Diagnoses   Name Primary?    Pelvic floor weakness Yes    Chronic left-sided low back pain without sciatica     Weakness generalized     Coordination abnormal        Physician: Kinga Lee, *    Visit Date: 6/21/2023    Physician Orders: PT Eval and Treat  Medical Diagnosis from Referral: N81.89 (ICD-10-CM) - Pelvic floor weakness   Evaluation Date: 3/9/2023  Authorization Period Expiration: 3/1/2024  Plan of Care Expiration: 7/20/2023  Progress note: 7/8/2023  Visit # 11/12 Visits authorized: 9/17    Cancelled Visits: 0  No Show Visits: 0    Time In: 2:37 PM   Time Out: 3:20 PM    Total Billable Time: 43 minutes    Precautions: Standard, cancer, and extensive abdominal surgery    Subjective   Pt reports: States that he had his blood work done and he is concerned about some of his labs. Has not gone to the gym this week.     He was compliant with home exercise program.  Response to previous treatment: none  Functional change: none    Pain in:  4/10  back pain  Pain out: 4 /10 back pain  Location:  back pain at the thoracolumbar junction    Objective   Fredis received the following manual therapy techniques: to develop flexibility for 30 minutes including: soft tissue mobilization of the left psoas, quadratus lumborum, left lower lumbar paraspinals, and bilateral hip rotation, bilateral single knee to chest and bilateral piriformis PROM.     Fredis received therapeutic exercises to develop  strength, endurance, posture, and core stabilization for 13 minutes including: Educated on and encouraged lower extremity ROM. Encouraged continued walking program as he tolerates.     Intervention Eval  04/26/2023 05/03/2023 05/17/2023 05/24/2023 05/31/2023 06/14/2023 06/21/2023    TherEx Pelvic tilts    In quadriped 5xs 5xs supine      TherEx Bridges    5xs with pelvic pain 5xs       TherEx Trunk ROM with ball standing            TherEx HS ROM seated            TherEx Glut squeeze - on bolster           TherEx SAQ     3x10 4#      TherEx Hip adduction with ball     3x10       TherEx Standing hip extension           TherEx LTR     3xs with assistance      TherEx HS curls           TherEx Hip abd hooklying     Green TB 3x10      NeuroRe-ed RUSI  rehabilitative ultrasound imaging to help visualize pelvic floor muscle contraction and relaxation with kegels and core awareness. Patient with 1 occasion of good core activation; however, pt did not have sensation of this. Poor image of pelvic floor could be secondary to his screen and scar tissue.          ManualTx Scar massage  X          TherAct Bowel habits  Education on continued pain management to prevent muscle spasm. Talked of using a squatty potty; however, this position does not seem to help him. Encouraged going to the gym. Advised to allow his body time to rest. Avoid lifting excessive weights.      Bowels are moving well      TherEx TM   TM x 12 min - 1.1-2.0        TherEx Scapular retraction   Green t-band = 2x15        TherEx Bilateral external rotation arms   Green t-band = 2x15        TherEx LAQ   4# 3x10         TherEx Shoulder shrugs   5# 3x10        TherEx Bicep curls   5# 3x10        TherEx Baldev pose    x       TherEx SKTC    x       TherEx Piriformis stretch    x       TherEx DKTC with ball    x       TherEx Wall squats/sit-stand       5xs with cues      Home Exercises Provided and Patient Education Provided     Education provided:   - posture/body mechanices  Discussed progression of plan of care with patient; educated pt in activity modification; reviewed HEP with pt. Pt demonstrated and verbalized understanding of all instruction and was provided with a handout of HEP (see Patient Instructions).    Written Home Exercises Provided: yes.  Exercises were reviewed and Fredis was able to demonstrate them prior to the end of the  session.  Fredis demonstrated good  understanding of the education provided.     See EMR under Patient Instructions for exercises provided 04/06/2023 .    Assessment   Doing well overall, having pain with lumbar spine ROM; however, pain is tolerable.      Fredis Is progressing well towards his goals.   Pt prognosis is Excellent.     Pt will continue to benefit from skilled outpatient physical therapy to address the deficits listed in the problem list box on initial evaluation, provide pt/family education and to maximize pt's level of independence in the home and community environment.     Pt's spiritual, cultural and educational needs considered and pt agreeable to plan of care and goals.     Anticipated barriers to physical therapy: none     Goals:  Short Term Goals:   Patient will be in with home exercise program. Goal met - 04/26/2023   Patient will be able to ambulate on TM x 5 min without increased pain. Goal met - 05/17/2023   Patient will report being able to stand for up to 15 min without increased back pain. Goal met - 6/8/2023     Long Term Goals:   Patient will be independent with progressive home exercise program. Goal met - 6/8/2023   Patient will report tolerating going to the gym at least 2-3 times a week. Goal not met - progressing  Patient will be able to ambulate on TM x 10 min without increased back pain. Goal met - 05/17/2023     New Long Term Goals: 6 weeks  Patient will be able to tolerate riding in his boat without left hip pain.   Patient will demonstrate bilateral hip extension to 10* to allow for improved gait mechanics.   Patient will deny back pain.  Patient will demonstrate lumbar spine ROM without pain.     Plan     Continue with progressive strength and mobility training. Await scan results and blood work. Sees oncologist on Friday. Having a PET scan done tomorrow as routine surveillance.   Jean-Claude Cordoba, PT

## 2023-06-22 ENCOUNTER — HOSPITAL ENCOUNTER (OUTPATIENT)
Dept: RADIOLOGY | Facility: HOSPITAL | Age: 53
Discharge: HOME | End: 2023-06-22
Attending: INTERNAL MEDICINE
Payer: MEDICARE

## 2023-06-22 DIAGNOSIS — C78.6 SECONDARY MALIGNANT NEOPLASM OF RETROPERITONEUM AND PERITONEUM: ICD-10-CM

## 2023-06-22 DIAGNOSIS — C18.4 MALIGNANT TUMOR OF TRANSVERSE COLON: ICD-10-CM

## 2023-06-22 LAB — GLUCOSE SERPL-MCNC: 72 MG/DL (ref 70–110)

## 2023-06-22 PROCEDURE — A9552 F18 FDG: HCPCS | Mod: PN

## 2023-06-22 PROCEDURE — 78815 PET IMAGE W/CT SKULL-THIGH: CPT | Mod: TC,PN

## 2023-06-22 PROCEDURE — 78815 PET IMAGE W/CT SKULL-THIGH: CPT | Mod: 26,PS,, | Performed by: STUDENT IN AN ORGANIZED HEALTH CARE EDUCATION/TRAINING PROGRAM

## 2023-06-22 PROCEDURE — 78815 NM PET CT ROUTINE: ICD-10-PCS | Mod: 26,PS,, | Performed by: STUDENT IN AN ORGANIZED HEALTH CARE EDUCATION/TRAINING PROGRAM

## 2023-06-22 NOTE — PROGRESS NOTES
PET Imaging Questionnaire    Are you a Diabetic? Recent Blood Sugar level? Yes    Are you anemic? Bone Marrow Stimulation Meds? Yes    Have you had a CT Scan, if so when & where was your last one? Yes -     Have you had a PET Scan, if so when & where was your last one? Yes -     Chemotherapy or currently on Chemotherapy? Yes    Radiation therapy? Yes    Surgical History:   Past Surgical History:   Procedure Laterality Date    ADENOIDECTOMY      ANOSCOPY N/A 2/24/2022    Procedure: ANOSCOPY;  Surgeon: KARIN Mittal MD;  Location: [facility] ENDO;  Service: Endoscopy;  Laterality: N/A;  WITH BOTOX    APPLICATION OF WOUND VACUUM-ASSISTED CLOSURE DEVICE N/A 10/27/2021    Procedure: APPLICATION, WOUND VAC;  Surgeon: KARIN Mittal MD;  Location: [facility] OR 2ND FLR;  Service: Colon and Rectal;  Laterality: N/A;    COLON SURGERY      2 colon resections    COLONOSCOPY N/A 2/2/2023    Procedure: COLONOSCOPY;  Surgeon: KARIN Mittal MD;  Location: [facility] ENDO;  Service: Endoscopy;  Laterality: N/A;  in January or February per his request please.  Thank you!    EXAMINATION UNDER ANESTHESIA N/A 10/26/2020    Procedure: Exam under anesthesia, flex sig, botox injection, lithotomy;  Surgeon: KARIN Mittal MD;  Location: [facility] OR 2ND FLR;  Service: Endoscopy;  Laterality: N/A;    EXAMINATION UNDER ANESTHESIA N/A 08/11/2021    Procedure: Exam under anesthesia, lithotomy, botox injection;  Surgeon: KARIN Mittal MD;  Location: [facility] OR 2ND FLR;  Service: Endoscopy;  Laterality: N/A;    EXCISION OF MASS OF ABDOMEN N/A 12/28/2020    Procedure: EXCISION, MASS, ABDOMEN;  Surgeon: KARIN Mittal MD;  Location: [facility] OR 2ND FLR;  Service: Colon and Rectal;  Laterality: N/A;    FLEXIBLE SIGMOIDOSCOPY  10/26/2020    Procedure: SIGMOIDOSCOPY, FLEXIBLE;  Surgeon: KARIN Mittal MD;  Location: [facility] OR 2ND FLR;  Service: Endoscopy;;    FLEXIBLE SIGMOIDOSCOPY N/A 08/11/2021    Procedure: SIGMOIDOSCOPY, FLEXIBLE;   Surgeon: KARIN Mittal MD;  [facility];  Service: Endoscopy;  Laterality: N/A;    FLEXIBLE SIGMOIDOSCOPY N/A 9/22/2022    Procedure: Anoscopy with botox;  Surgeon: KARIN Mittal MD;  Location: [facility] ENDO;  Service: Endoscopy;  Laterality: N/A;    INJECTION OF BOTULINUM TOXIN TYPE A  10/26/2020    Procedure: INJECTION, BOTULINUM TOXIN, TYPE A;  Surgeon: KARIN Mittal MD;  Location: [facility] OR 2ND FLR;  Service: Endoscopy;;    INJECTION OF BOTULINUM TOXIN TYPE A  08/11/2021    Procedure: INJECTION, BOTULINUM TOXIN, TYPE A;  Surgeon: KARIN Mittal MD;  Location: [facility] OR [facility] FLR;  Service: Endoscopy;;    INJECTION OF BOTULINUM TOXIN TYPE A N/A 2/24/2022    Procedure: INJECTION, BOTULINUM TOXIN, TYPE A;  Surgeon: KARIN Mittal MD;  Location: [facility] ENDO;  Service: Endoscopy;  Laterality: N/A;    INJECTION OF BOTULINUM TOXIN TYPE A  2/2/2023    Procedure: INJECTION, BOTULINUM TOXIN, TYPE A;  Surgeon: KARIN Mittal MD;  Location: [facility] ENDO;  Service: Endoscopy;;  rectum      TONSILLECTOMY      WOUND DEBRIDEMENT N/A 10/27/2021    Procedure: DEBRIDEMENT, WOUND,;  Surgeon: KARIN Mittal MD;  [facility];  Service: Colon and Rectal;  Laterality: N/A;        Have you been fasting for at least 6 hours? Yes    Is there any chance you may be pregnant or breastfeeding? No    Assay: 12.82 MCi@:10:27   Injection Site:RT Hand    Residual: .673 mCi@: 10:29   Technologist: Mian Regalado Injected:12.15 mCi

## 2023-06-28 ENCOUNTER — CLINICAL SUPPORT (OUTPATIENT)
Dept: REHABILITATION | Facility: HOSPITAL | Age: 53
End: 2023-06-28
Attending: INTERNAL MEDICINE
Payer: MEDICARE

## 2023-06-28 DIAGNOSIS — N81.89 PELVIC FLOOR WEAKNESS: Primary | ICD-10-CM

## 2023-06-28 DIAGNOSIS — G89.29 CHRONIC LEFT-SIDED LOW BACK PAIN WITHOUT SCIATICA: ICD-10-CM

## 2023-06-28 DIAGNOSIS — M54.50 CHRONIC LEFT-SIDED LOW BACK PAIN WITHOUT SCIATICA: ICD-10-CM

## 2023-06-28 DIAGNOSIS — R53.1 WEAKNESS GENERALIZED: ICD-10-CM

## 2023-06-28 DIAGNOSIS — R27.8 COORDINATION ABNORMAL: ICD-10-CM

## 2023-06-28 PROCEDURE — 97140 MANUAL THERAPY 1/> REGIONS: CPT | Mod: PN | Performed by: PHYSICAL THERAPIST

## 2023-06-28 PROCEDURE — 97110 THERAPEUTIC EXERCISES: CPT | Mod: PN | Performed by: PHYSICAL THERAPIST

## 2023-06-28 NOTE — PROGRESS NOTES
Pelvic Health Physical Therapy   Treatment Note      Name: Fredis Ugarte  Clinic Number: [id number]    Therapy Diagnosis:   Encounter Diagnoses   Name Primary?    Pelvic floor weakness Yes    Chronic left-sided low back pain without sciatica     Weakness generalized     Coordination abnormal        Physician: Kinga Lee, *    Visit Date: 6/28/2023    Physician Orders: PT Eval and Treat  Medical Diagnosis from Referral: N81.89 (ICD-10-CM) - Pelvic floor weakness   Evaluation Date: 3/9/2023  Authorization Period Expiration: 3/1/2024  Plan of Care Expiration: 7/20/2023  Progress note: 7/8/2023  Visit # 12/18 Visits authorized: 11/17    Cancelled Visits: 0  No Show Visits: 0    Time In: 2:40 PM   Time Out: 3:20   Total Billable Time: 40 minutes    Precautions: Standard, cancer, and extensive abdominal surgery    Subjective   Pt reports: States that he got on the 4 davila, but his left hip is hurting today. He did this on Sunday and was hurting a lot on Monday. It is very tight. States that he is having trouble with the seroma in the evening with swelling on the left side of his pelvis. His wife states that to shift the 4 davila, he has to use the left hip and pull up.     He was compliant with home exercise program.  Response to previous treatment: none  Functional change: none    Pain in:  5/10  back pain  Pain out: 6/10 back pain  Location:  back pain at the thoracolumbar junction    Objective   Fredis received the following manual therapy techniques: to develop flexibility for 30 minutes including: soft tissue mobilization of the left psoas, quadratus lumborum, left lower lumbar paraspinals, and bilateral hip rotation, bilateral single knee to chest and bilateral piriformis PROM.  Left hip distraction in the open packed position.     Fredis received therapeutic exercises to develop  strength, endurance, posture, and core stabilization for 10 minutes including: Educated on and encouraged lower extremity  ROM. Provided stretches for home exercise program. Encouraged continued walking program as he tolerates. Adjusting distance with fatigue. I feel as though the 4 davila is contributing to over use of his left hip flexor and contributing to pain.     Intervention Eval  05/17/2023 05/24/2023 05/31/2023 06/14/2023 06/21/2023 06/28/2023    TherEx Pelvic tilts  In quadriped 5xs 5xs supine       TherEx Bridges  5xs with pelvic pain 5xs       TherEx Trunk ROM with ball standing           TherEx HS ROM seated           TherEx Glut squeeze - on bolster          TherEx SAQ   3x10 4#       TherEx Hip adduction with ball   3x10        TherEx Standing hip extension          TherEx LTR   3xs with assistance       TherEx HS curls          TherEx Hip abd hooklying   Green TB 3x10       NeuroRe-ed RUSI          ManualTx Scar massage          TherAct Bowel habits   Bowels are moving well       TherEx TM          TherEx Scapular retraction          TherEx Bilateral external rotation arms          TherEx LAQ          TherEx Shoulder shrugs          TherEx Bicep curls          TherEx Baldev pose  x        TherEx SKTC  x        TherEx Piriformis stretch  x        TherEx DKTC with ball  x        TherEx Wall squats/sit-stand     5xs with cues     TherEx Open book stretch       X    TherEx Trunk rotation with UE abd stretch       X      Home Exercises Provided and Patient Education Provided     Education provided:   - posture/body mechanices  Discussed progression of plan of care with patient; educated pt in activity modification; reviewed HEP with pt. Pt demonstrated and verbalized understanding of all instruction and was provided with a handout of HEP (see Patient Instructions).    Written Home Exercises Provided: yes.  Exercises were reviewed and Fredis was able to demonstrate them prior to the end of the session.  Fredis demonstrated good  understanding of the education provided.     See EMR under Patient Instructions for exercises  provided 04/06/2023 .    Assessment   Doing well overall, having pain with lumbar spine ROM; however, pain is tolerable.  Left paraspinal pain is better and muscle mobility has improved.     Fredis Is progressing well towards his goals.   Pt prognosis is Excellent.     Pt will continue to benefit from skilled outpatient physical therapy to address the deficits listed in the problem list box on initial evaluation, provide pt/family education and to maximize pt's level of independence in the home and community environment.     Pt's spiritual, cultural and educational needs considered and pt agreeable to plan of care and goals.     Anticipated barriers to physical therapy: none     Goals:  Short Term Goals:   Patient will be in with home exercise program. Goal met - 04/26/2023   Patient will be able to ambulate on TM x 5 min without increased pain. Goal met - 05/17/2023   Patient will report being able to stand for up to 15 min without increased back pain. Goal met - 6/8/2023     Long Term Goals:   Patient will be independent with progressive home exercise program. Goal met - 6/8/2023   Patient will report tolerating going to the gym at least 2-3 times a week. Goal not met - progressing  Patient will be able to ambulate on TM x 10 min without increased back pain. Goal met - 05/17/2023     New Long Term Goals: 6 weeks  Patient will be able to tolerate riding in his boat without left hip pain.   Patient will demonstrate bilateral hip extension to 10* to allow for improved gait mechanics.   Patient will deny back pain.  Patient will demonstrate lumbar spine ROM without pain.     Plan     Continue with progressive strength and mobility training. Will contact ortho to assist with left hip pain. Will contact surgeon for precautions on the screen.     Jean-Claude Cordoba, PT

## 2023-07-12 ENCOUNTER — CLINICAL SUPPORT (OUTPATIENT)
Dept: REHABILITATION | Facility: HOSPITAL | Age: 53
End: 2023-07-12
Attending: INTERNAL MEDICINE
Payer: MEDICARE

## 2023-07-12 DIAGNOSIS — G89.29 CHRONIC LEFT-SIDED LOW BACK PAIN WITHOUT SCIATICA: ICD-10-CM

## 2023-07-12 DIAGNOSIS — M54.50 CHRONIC LEFT-SIDED LOW BACK PAIN WITHOUT SCIATICA: ICD-10-CM

## 2023-07-12 DIAGNOSIS — N81.89 PELVIC FLOOR WEAKNESS: Primary | ICD-10-CM

## 2023-07-12 DIAGNOSIS — R53.1 WEAKNESS GENERALIZED: ICD-10-CM

## 2023-07-12 DIAGNOSIS — R27.8 COORDINATION ABNORMAL: ICD-10-CM

## 2023-07-12 PROCEDURE — 97530 THERAPEUTIC ACTIVITIES: CPT | Mod: PN | Performed by: PHYSICAL THERAPIST

## 2023-07-12 PROCEDURE — 97140 MANUAL THERAPY 1/> REGIONS: CPT | Mod: PN | Performed by: PHYSICAL THERAPIST

## 2023-07-12 NOTE — PROGRESS NOTES
Pelvic Health Physical Therapy   Treatment Note      Name: Fredis Ugarte  Clinic Number: [id number]    Therapy Diagnosis:   Encounter Diagnoses   Name Primary?    Pelvic floor weakness Yes    Chronic left-sided low back pain without sciatica     Weakness generalized     Coordination abnormal        Physician: Kinga Lee, *    Visit Date: 7/12/2023    Physician Orders: PT Eval and Treat  Medical Diagnosis from Referral: N81.89 (ICD-10-CM) - Pelvic floor weakness   Evaluation Date: 3/9/2023  Authorization Period Expiration: 3/1/2024  Plan of Care Expiration: 7/20/2023  Progress note: 7/8/2023  Visit # 13/18 Visits authorized: 12/17    Cancelled Visits: 0  No Show Visits: 0    Time In: 2:38 PM   Time Out: 3:24 PM   Total Billable Time: 46 minutes    Precautions: Standard, cancer, and extensive abdominal surgery    Subjective   Pt reports: Reports that he was hurting significantly in the left hip after last visit for 10 days. Could not walk. Almost went to the emergency room.     He was compliant with home exercise program.  Response to previous treatment: none  Functional change: none    Pain in:  4/10  back pain  Pain out: 4/10 back pain  Location:  back pain at the thoracolumbar junction    Objective   Fredis received the following manual therapy techniques: to develop flexibility for 36 minutes including: soft tissue mobilization of the left psoas, quadratus lumborum, left lower lumbar paraspinals, and bilateral hip rotation, bilateral single knee to chest and bilateral piriformis PROM.  Soft tissue massage to his abdominal scar.     Fredis participated in dynamic functional therapeutic activities to improve functional performance for 10  minutes, including:  Encouraged return to the gym this week with the goal of working towards 150 min of cardio a week. I advised against the stair step machine.  Discussed further medical management of his left hip pain.     Intervention Eval  05/31/2023 06/14/2023   06/21/2023 06/28/2023 07/12/2023    TherEx Pelvic tilts         TherEx Bridges         TherEx Trunk ROM with ball standing          TherEx HS ROM seated          TherEx Glut squeeze - on bolster         TherEx SAQ         TherEx Hip adduction with ball         TherEx Standing hip extension         TherEx LTR         TherEx HS curls         TherEx Hip abd hooklying         NeuroRe-ed RUSI         ManualTx Scar massage    `  soft tissue mobilization of the left psoas, quadratus lumborum, left lower lumbar paraspinals, and bilateral hip rotation, bilateral single knee to chest and bilateral piriformis PROM. Soft tissue massage to his abdominal scar.    TherAct Bowel habits         TherEx TM         TherEx Scapular retraction         TherEx Bilateral external rotation arms         TherEx LAQ         TherEx Shoulder shrugs         TherEx Bicep curls         TherEx Baldev pose         TherEx SKTC         TherEx Piriformis stretch         TherEx DKTC with ball         TherEx Wall squats/sit-stand   5xs with cues      TherEx Open book stretch     X     TherEx Trunk rotation with UE abd stretch     X       Home Exercises Provided and Patient Education Provided     Education provided:   - posture/body mechanices  Discussed progression of plan of care with patient; educated pt in activity modification; reviewed HEP with pt. Pt demonstrated and verbalized understanding of all instruction and was provided with a handout of HEP (see Patient Instructions).    Written Home Exercises Provided: yes.  Exercises were reviewed and Fredis was able to demonstrate them prior to the end of the session.  Fredis demonstrated good  understanding of the education provided.     See EMR under Patient Instructions for exercises provided 04/06/2023 .    Assessment   Lumbar spine pain continues to be improved. Limited by significant left hip pain this past 2 weeks. Not interested in further medical management at this time. No precautions with  abdominal scar massage per Dr. Mittal.     Fredis Is progressing well towards his goals.   Pt prognosis is Excellent.     Pt will continue to benefit from skilled outpatient physical therapy to address the deficits listed in the problem list box on initial evaluation, provide pt/family education and to maximize pt's level of independence in the home and community environment.     Pt's spiritual, cultural and educational needs considered and pt agreeable to plan of care and goals.     Anticipated barriers to physical therapy: none     Goals:  Short Term Goals:   Patient will be in with home exercise program. Goal met - 04/26/2023   Patient will be able to ambulate on TM x 5 min without increased pain. Goal met - 05/17/2023   Patient will report being able to stand for up to 15 min without increased back pain. Goal met - 6/8/2023     Long Term Goals:   Patient will be independent with progressive home exercise program. Goal met - 6/8/2023   Patient will report tolerating going to the gym at least 2-3 times a week. Goal not met - progressing  Patient will be able to ambulate on TM x 10 min without increased back pain. Goal met - 05/17/2023     New Long Term Goals: 6 weeks  Patient will be able to tolerate riding in his boat without left hip pain.   Patient will demonstrate bilateral hip extension to 10* to allow for improved gait mechanics.   Patient will deny back pain.  Patient will demonstrate lumbar spine ROM without pain.     Plan     Continue with progressive strength and mobility training.     Jean-Claude Cordoba, PT

## 2023-07-26 ENCOUNTER — CLINICAL SUPPORT (OUTPATIENT)
Dept: REHABILITATION | Facility: HOSPITAL | Age: 53
End: 2023-07-26
Payer: MEDICARE

## 2023-07-26 DIAGNOSIS — R27.8 COORDINATION ABNORMAL: ICD-10-CM

## 2023-07-26 DIAGNOSIS — G89.29 CHRONIC LEFT-SIDED LOW BACK PAIN WITHOUT SCIATICA: ICD-10-CM

## 2023-07-26 DIAGNOSIS — R53.1 WEAKNESS GENERALIZED: ICD-10-CM

## 2023-07-26 DIAGNOSIS — N81.89 PELVIC FLOOR WEAKNESS: Primary | ICD-10-CM

## 2023-07-26 DIAGNOSIS — M54.50 CHRONIC LEFT-SIDED LOW BACK PAIN WITHOUT SCIATICA: ICD-10-CM

## 2023-07-26 PROCEDURE — 97140 MANUAL THERAPY 1/> REGIONS: CPT | Mod: PN | Performed by: PHYSICAL THERAPIST

## 2023-07-26 PROCEDURE — 97530 THERAPEUTIC ACTIVITIES: CPT | Mod: PN | Performed by: PHYSICAL THERAPIST

## 2023-07-26 NOTE — PROGRESS NOTES
Pelvic Health Physical Therapy   Treatment Note and Updated POC     Name: Fredis Ugarte  Clinic Number: [id number]    Therapy Diagnosis:   Encounter Diagnoses   Name Primary?    Pelvic floor weakness Yes    Chronic left-sided low back pain without sciatica     Coordination abnormal     Weakness generalized        Physician: Kinga Lee, *    Visit Date: 7/26/2023    Physician Orders: PT Eval and Treat  Medical Diagnosis from Referral: N81.89 (ICD-10-CM) - Pelvic floor weakness   Evaluation Date: 3/9/2023  Authorization Period Expiration: 3/1/2024  Plan of Care Expiration: 10/19/2023  Progress note: 8/24/2023  Visit # 14/30 Visits authorized: 13/17    Cancelled Visits: 0  No Show Visits: 0    Time In: 2:35 PM   Time Out: 3:13 PM    Total Billable Time: 38 minutes    Precautions: Standard, cancer, and extensive abdominal surgery    Subjective   Pt reports: He went walk and had left hip pain that limited his tolerance.     He was compliant with home exercise program.  Response to previous treatment: none  Functional change: none    Pain in:  4/10  left hip pain  Pain out: 4/10 left hip pain  Location:  left hip    Objective   Fredis received the following manual therapy techniques: to develop flexibility for 30 minutes including: soft tissue mobilization of the left psoas, quadratus lumborum, left lower lumbar paraspinals, and bilateral hip rotation, bilateral single knee to chest and bilateral piriformis PROM.  Soft tissue massage to his abdominal scar.     Fredis participated in dynamic functional therapeutic activities to improve functional performance for 8  minutes, including:   Discussed further medical management of his left hip pain with the possibility of seeing someone with functional pain management.       Intervention Eval  05/31/2023 06/14/2023 06/21/2023 06/28/2023 07/12/2023    TherEx Pelvic tilts         TherEx Bridges         TherEx Trunk ROM with ball standing          TherEx HS ROM  seated          TherEx Glut squeeze - on bolster         TherEx SAQ         TherEx Hip adduction with ball         TherEx Standing hip extension         TherEx LTR         TherEx HS curls         TherEx Hip abd hooklying         NeuroRe-ed RUSI         ManualTx Scar massage    `  soft tissue mobilization of the left psoas, quadratus lumborum, left lower lumbar paraspinals, and bilateral hip rotation, bilateral single knee to chest and bilateral piriformis PROM. Soft tissue massage to his abdominal scar.    TherAct Bowel habits         TherEx TM         TherEx Scapular retraction         TherEx Bilateral external rotation arms         TherEx LAQ         TherEx Shoulder shrugs         TherEx Bicep curls         TherEx Baldev pose         TherEx SKTC         TherEx Piriformis stretch         TherEx DKTC with ball         TherEx Wall squats/sit-stand   5xs with cues      TherEx Open book stretch     X     TherEx Trunk rotation with UE abd stretch     X       Home Exercises Provided and Patient Education Provided     Education provided:   - posture/body mechanices  Discussed progression of plan of care with patient; educated pt in activity modification; reviewed HEP with pt. Pt demonstrated and verbalized understanding of all instruction and was provided with a handout of HEP (see Patient Instructions).    Written Home Exercises Provided: yes.  Exercises were reviewed and Fredis was able to demonstrate them prior to the end of the session.  Fredis demonstrated good  understanding of the education provided.     See EMR under Patient Instructions for exercises provided 04/06/2023 .    Assessment   Functional mobility is limited by left hip pain. I am unsure if the left hip pain has contributed to his pelvic pain - it is a strong possibility there is a correlation. Core strength has improved. He has had a hard time recently with consistent exercise.     Fredis Is progressing well towards his goals.   Pt prognosis is Excellent.      Pt will continue to benefit from skilled outpatient physical therapy to address the deficits listed in the problem list box on initial evaluation, provide pt/family education and to maximize pt's level of independence in the home and community environment.     Pt's spiritual, cultural and educational needs considered and pt agreeable to plan of care and goals.     Anticipated barriers to physical therapy: none     Goals:  Short Term Goals:   Patient will be compliant with home exercise program. Goal no longer met - limited by left hip pain  Patient will be able to ambulate on TM x 5 min without increased pain. Goal met - 05/17/2023   Patient will report being able to stand for up to 15 min without increased back pain. Goal no longer met - limited by left hip pain     Long Term Goals:   Patient will be independent with progressive home exercise program. Goal no longer met - limited by left hip pain  Patient will report tolerating going to the gym at least 2-3 times a week. Goal not met - progressing  Patient will be able to ambulate on TM x 10 min without increased back pain. Goal no longer met - limited by left hip pain    New Long Term Goals: 6 weeks  Patient will be able to tolerate riding in his boat without left hip pain. Goal not met - progressing  Patient will demonstrate bilateral hip extension to 10* to allow for improved gait mechanics. Goal not met - progressing  Patient will deny back pain. Goal met - 07/26/2023   Patient will demonstrate lumbar spine ROM without pain. Goal not met - progressing    Plan     Continue with progressive strength and mobility training to prevent further functional decline and pain. Patient does benefit from manual therapy for left hip ROM.     Jean-Claude Cordoba, PT

## 2023-07-27 NOTE — PLAN OF CARE
Pelvic Health Physical Therapy   Treatment Note and Updated POC     Name: Fredis Ugarte  Clinic Number: [id number]    Therapy Diagnosis:   Encounter Diagnoses   Name Primary?    Pelvic floor weakness Yes    Chronic left-sided low back pain without sciatica     Coordination abnormal     Weakness generalized        Physician: Kinga Lee, *    Visit Date: 7/26/2023    Physician Orders: PT Eval and Treat  Medical Diagnosis from Referral: N81.89 (ICD-10-CM) - Pelvic floor weakness   Evaluation Date: 3/9/2023  Authorization Period Expiration: 3/1/2024  Plan of Care Expiration: 10/19/2023  Progress note: 8/24/2023  Visit # 14/30 Visits authorized: 13/17    Cancelled Visits: 0  No Show Visits: 0    Time In: 2:35 PM   Time Out: 3:13 PM    Total Billable Time: 38 minutes    Precautions: Standard, cancer, and extensive abdominal surgery    Subjective   Pt reports: He went walk and had left hip pain that limited his tolerance.     He was compliant with home exercise program.  Response to previous treatment: none  Functional change: none    Pain in:  4/10  left hip pain  Pain out: 4/10 left hip pain  Location:  left hip    Objective   Fredis received the following manual therapy techniques: to develop flexibility for 30 minutes including: soft tissue mobilization of the left psoas, quadratus lumborum, left lower lumbar paraspinals, and bilateral hip rotation, bilateral single knee to chest and bilateral piriformis PROM. Soft tissue massage to his abdominal scar.     Fredis participated in dynamic functional therapeutic activities to improve functional performance for 8  minutes, including:   Discussed further medical management of his left hip pain with the possibility of seeing someone with functional pain management.       Intervention Eval  05/31/2023 06/14/2023 06/21/2023 06/28/2023 07/12/2023    TherEx Pelvic tilts         TherEx Bridges         TherEx Trunk ROM with ball standing          TherEx HS ROM  seated          TherEx Glut squeeze - on bolster         TherEx SAQ         TherEx Hip adduction with ball         TherEx Standing hip extension         TherEx LTR         TherEx HS curls         TherEx Hip abd hooklying         NeuroRe-ed RUSI         ManualTx Scar massage    `  soft tissue mobilization of the left psoas, quadratus lumborum, left lower lumbar paraspinals, and bilateral hip rotation, bilateral single knee to chest and bilateral piriformis PROM. Soft tissue massage to his abdominal scar.    TherAct Bowel habits         TherEx TM         TherEx Scapular retraction         TherEx Bilateral external rotation arms         TherEx LAQ         TherEx Shoulder shrugs         TherEx Bicep curls         TherEx Baldev pose         TherEx SKTC         TherEx Piriformis stretch         TherEx DKTC with ball         TherEx Wall squats/sit-stand   5xs with cues      TherEx Open book stretch     X     TherEx Trunk rotation with UE abd stretch     X       Home Exercises Provided and Patient Education Provided     Education provided:   - posture/body mechanices  Discussed progression of plan of care with patient; educated pt in activity modification; reviewed HEP with pt. Pt demonstrated and verbalized understanding of all instruction and was provided with a handout of HEP (see Patient Instructions).    Written Home Exercises Provided: yes.  Exercises were reviewed and Fredis was able to demonstrate them prior to the end of the session.  Fredis demonstrated good  understanding of the education provided.     See EMR under Patient Instructions for exercises provided 04/06/2023 .    Assessment   Functional mobility is limited by left hip pain. I am unsure if the left hip pain has contributed to his pelvic pain - it is a strong possibility there is a correlation. Core strength has improved. He has had a hard time recently with consistent exercise.     Fredis Is progressing well towards his goals.   Pt prognosis is Excellent.      Pt will continue to benefit from skilled outpatient physical therapy to address the deficits listed in the problem list box on initial evaluation, provide pt/family education and to maximize pt's level of independence in the home and community environment.     Pt's spiritual, cultural and educational needs considered and pt agreeable to plan of care and goals.     Anticipated barriers to physical therapy: none     Goals:  Short Term Goals:   Patient will be compliant with home exercise program. Goal no longer met - limited by left hip pain  Patient will be able to ambulate on TM x 5 min without increased pain. Goal met - 05/17/2023   Patient will report being able to stand for up to 15 min without increased back pain. Goal no longer met - limited by left hip pain     Long Term Goals:   Patient will be independent with progressive home exercise program. Goal no longer met - limited by left hip pain  Patient will report tolerating going to the gym at least 2-3 times a week. Goal not met - progressing  Patient will be able to ambulate on TM x 10 min without increased back pain. Goal no longer met - limited by left hip pain    New Long Term Goals: 6 weeks  Patient will be able to tolerate riding in his boat without left hip pain. Goal not met - progressing  Patient will demonstrate bilateral hip extension to 10* to allow for improved gait mechanics. Goal not met - progressing  Patient will deny back pain. Goal met - 07/26/2023   Patient will demonstrate lumbar spine ROM without pain. Goal not met - progressing    Plan     Continue with progressive strength and mobility training to prevent further functional decline and pain. Patient does benefit from manual therapy for left hip ROM.     Jean-Claude Cordoba, PT

## 2023-08-02 ENCOUNTER — CLINICAL SUPPORT (OUTPATIENT)
Dept: REHABILITATION | Facility: HOSPITAL | Age: 53
End: 2023-08-02
Attending: INTERNAL MEDICINE
Payer: MEDICARE

## 2023-08-02 DIAGNOSIS — R53.1 WEAKNESS GENERALIZED: ICD-10-CM

## 2023-08-02 DIAGNOSIS — N81.89 PELVIC FLOOR WEAKNESS: ICD-10-CM

## 2023-08-02 DIAGNOSIS — M54.50 CHRONIC LEFT-SIDED LOW BACK PAIN WITHOUT SCIATICA: Primary | ICD-10-CM

## 2023-08-02 DIAGNOSIS — R27.8 COORDINATION ABNORMAL: ICD-10-CM

## 2023-08-02 DIAGNOSIS — G89.29 CHRONIC LEFT-SIDED LOW BACK PAIN WITHOUT SCIATICA: Primary | ICD-10-CM

## 2023-08-02 PROCEDURE — 97140 MANUAL THERAPY 1/> REGIONS: CPT | Mod: PN | Performed by: PHYSICAL THERAPIST

## 2023-08-02 PROCEDURE — 97530 THERAPEUTIC ACTIVITIES: CPT | Mod: PN | Performed by: PHYSICAL THERAPIST

## 2023-08-02 NOTE — PROGRESS NOTES
Pelvic Health Physical Therapy   Treatment Note      Name: Fredis Ugarte  Clinic Number: [id number]    Therapy Diagnosis:   Encounter Diagnoses   Name Primary?    Chronic left-sided low back pain without sciatica Yes    Coordination abnormal     Pelvic floor weakness     Weakness generalized        Physician: Kinga Lee, *    Visit Date: 8/2/2023    Physician Orders: PT Eval and Treat  Medical Diagnosis from Referral: N81.89 (ICD-10-CM) - Pelvic floor weakness   Evaluation Date: 3/9/2023  Authorization Period Expiration: 3/1/2024  Plan of Care Expiration: 10/19/2023  Progress note: 8/24/2023  Visit # 15/30 Visits authorized: 14/17    Cancelled Visits: 0  No Show Visits: 0    Time In: 2:35 PM   Time Out: 3:20 PM     Total Billable Time: 45 minutes    Precautions: Standard, cancer, and extensive abdominal surgery    Subjective   Pt reports: Has not been to the gym, but plans on it. He did go to his camp that is being built and had to step up a large step. He did ok with this.     He was compliant with home exercise program.  Response to previous treatment: none  Functional change: none    Pain in:  4/10  left hip pain  Pain out: 2/10 left hip pain  Location:  left hip    Objective   Fredis received the following manual therapy techniques: to develop flexibility for 30 minutes including: soft tissue mobilization of the bilateral psoas, quadratus lumborum, left lower lumbar paraspinals, and bilateral hip rotation, bilateral single knee to chest and bilateral piriformis PROM. Soft tissue massage to his abdominal scar.     Fredis participated in dynamic functional therapeutic activities to improve functional performance for 15  minutes, including:   Discussed further medical management with botox as he has done in the past. Suggested a cycle of every 5 months instead of waiting until pain ramps up again.       Intervention Eval  05/31/2023 06/14/2023 06/21/2023 06/28/2023 07/12/2023 08/02/2023    TherEx  Pelvic tilts          TherEx Bridges          TherEx Trunk ROM with ball standing           TherEx HS ROM seated           TherEx Glut squeeze - on bolster          TherEx SAQ          TherEx Hip adduction with ball          TherEx Standing hip extension          TherEx LTR       X    TherEx HS curls          TherEx Hip abd hooklying          NeuroRe-ed RUSI          ManualTx Scar massage    `  soft tissue mobilization of the left psoas, quadratus lumborum, left lower lumbar paraspinals, and bilateral hip rotation, bilateral single knee to chest and bilateral piriformis PROM. Soft tissue massage to his abdominal scar.     TherAct Bowel habits          TherEx TM          TherEx Scapular retraction          TherEx Bilateral external rotation arms          TherEx LAQ          TherEx Shoulder shrugs          TherEx Bicep curls          TherEx Baldev pose          TherEx SKTC       X    TherEx Piriformis stretch       X    TherEx DKTC with ball          TherEx Wall squats/sit-stand   5xs with cues       TherEx Open book stretch     X      TherEx Trunk rotation with UE abd stretch     X        Home Exercises Provided and Patient Education Provided     Education provided:   - posture/body mechanices  Discussed progression of plan of care with patient; educated pt in activity modification; reviewed HEP with pt. Pt demonstrated and verbalized understanding of all instruction and was provided with a handout of HEP (see Patient Instructions).    Written Home Exercises Provided: yes.  Exercises were reviewed and Fredis was able to demonstrate them prior to the end of the session.  Fredis demonstrated good  understanding of the education provided.     See EMR under Patient Instructions for exercises provided 04/06/2023 .    Assessment   Improved pain after physical therapist session today. Is due for another round of pelvic floor botox as pelvic pain is becoming limiting again.     Fredis Is progressing well towards his goals.   Pt  prognosis is Excellent.     Pt will continue to benefit from skilled outpatient physical therapy to address the deficits listed in the problem list box on initial evaluation, provide pt/family education and to maximize pt's level of independence in the home and community environment.     Pt's spiritual, cultural and educational needs considered and pt agreeable to plan of care and goals.     Anticipated barriers to physical therapy: none     Goals:  Short Term Goals:   Patient will be compliant with home exercise program. Goal no longer met - limited by left hip pain  Patient will be able to ambulate on TM x 5 min without increased pain. Goal met - 05/17/2023   Patient will report being able to stand for up to 15 min without increased back pain. Goal no longer met - limited by left hip pain     Long Term Goals:   Patient will be independent with progressive home exercise program. Goal no longer met - limited by left hip pain  Patient will report tolerating going to the gym at least 2-3 times a week. Goal not met - progressing  Patient will be able to ambulate on TM x 10 min without increased back pain. Goal no longer met - limited by left hip pain    New Long Term Goals: 6 weeks  Patient will be able to tolerate riding in his boat without left hip pain. Goal not met - progressing  Patient will demonstrate bilateral hip extension to 10* to allow for improved gait mechanics. Goal not met - progressing  Patient will deny back pain. Goal met - 07/26/2023   Patient will demonstrate lumbar spine ROM without pain. Goal not met - progressing    Plan     Continue with progressive strength and mobility training to prevent further functional decline and pain. Patient does benefit from manual therapy for left hip ROM. Continue to encourage walking and strength training at the gym.     Jean-Claude Cordoba, PT

## 2023-08-16 ENCOUNTER — CLINICAL SUPPORT (OUTPATIENT)
Dept: REHABILITATION | Facility: HOSPITAL | Age: 53
End: 2023-08-16
Attending: INTERNAL MEDICINE
Payer: MEDICARE

## 2023-08-16 DIAGNOSIS — R53.1 WEAKNESS GENERALIZED: ICD-10-CM

## 2023-08-16 DIAGNOSIS — R27.8 COORDINATION ABNORMAL: ICD-10-CM

## 2023-08-16 DIAGNOSIS — M54.50 CHRONIC LEFT-SIDED LOW BACK PAIN WITHOUT SCIATICA: Primary | ICD-10-CM

## 2023-08-16 DIAGNOSIS — N81.89 PELVIC FLOOR WEAKNESS: ICD-10-CM

## 2023-08-16 DIAGNOSIS — G89.29 CHRONIC LEFT-SIDED LOW BACK PAIN WITHOUT SCIATICA: Primary | ICD-10-CM

## 2023-08-16 PROCEDURE — 97530 THERAPEUTIC ACTIVITIES: CPT | Mod: PN | Performed by: PHYSICAL THERAPIST

## 2023-08-16 PROCEDURE — 97140 MANUAL THERAPY 1/> REGIONS: CPT | Mod: PN | Performed by: PHYSICAL THERAPIST

## 2023-08-16 NOTE — PROGRESS NOTES
Pelvic Health Physical Therapy   Treatment Note      Name: Fredis Ugarte  Clinic Number: [id number]    Therapy Diagnosis:   Encounter Diagnoses   Name Primary?    Chronic left-sided low back pain without sciatica Yes    Pelvic floor weakness     Weakness generalized     Coordination abnormal        Physician: Kinga Lee, *    Visit Date: 8/16/2023    Physician Orders: PT Eval and Treat  Medical Diagnosis from Referral: N81.89 (ICD-10-CM) - Pelvic floor weakness   Evaluation Date: 3/9/2023  Authorization Period Expiration: 3/1/2024  Plan of Care Expiration: 10/19/2023  Progress note: 8/24/2023  Visit # 16/30 Visits authorized: 15/17    Cancelled Visits: 0  No Show Visits: 0    Time In: 2:35 PM   Time Out: 3:15 PM    Total Billable Time: 40 minutes    Precautions: Standard, cancer, and extensive abdominal surgery    Subjective   Pt reports: Scar massage helped. Had a day or so without pain.     He was compliant with home exercise program.  Response to previous treatment: none  Functional change: none    Pain in:  4/10  left hip pain  Pain out: 4/10 left hip pain  Location:  left hip    Objective   Fredis received the following manual therapy techniques: to develop flexibility for 30 minutes including: soft tissue mobilization of the bilateral psoas, quadratus lumborum, left lower lumbar paraspinals, and bilateral hip rotation, bilateral single knee to chest and bilateral piriformis PROM. Soft tissue massage to his abdominal scar. Patient has been compliant with home scar massage.     Fredis participated in dynamic functional therapeutic activities to improve functional performance for 10  minutes, including:   Discussed use of a pregnancy belt to assist with abdominal scar sensitivity.       Intervention Eval  06/28/2023 07/12/2023 08/02/2023    TherEx Pelvic tilts       TherEx Bridges       TherEx Trunk ROM with ball standing        TherEx HS ROM seated        TherEx Glut squeeze - on bolster        TherEx SAQ       TherEx Hip adduction with ball       TherEx Standing hip extension       TherEx LTR    X    TherEx HS curls       TherEx Hip abd hooklying       NeuroRe-ed RUSI       ManualTx Scar massage   soft tissue mobilization of the left psoas, quadratus lumborum, left lower lumbar paraspinals, and bilateral hip rotation, bilateral single knee to chest and bilateral piriformis PROM. Soft tissue massage to his abdominal scar.     TherAct Bowel habits       TherEx TM       TherEx Scapular retraction       TherEx Bilateral external rotation arms       TherEx LAQ       TherEx Shoulder shrugs       TherEx Bicep curls       TherEx Baldev pose       TherEx SKTC    X    TherEx Piriformis stretch    X    TherEx DKTC with ball       TherEx Wall squats/sit-stand       TherEx Open book stretch  X      TherEx Trunk rotation with UE abd stretch  X        Home Exercises Provided and Patient Education Provided     Education provided:   - posture/body mechanices  Discussed progression of plan of care with patient; educated pt in activity modification; reviewed HEP with pt. Pt demonstrated and verbalized understanding of all instruction and was provided with a handout of HEP (see Patient Instructions).    Written Home Exercises Provided: yes.  Exercises were reviewed and Fredis was able to demonstrate them prior to the end of the session.  Fredis demonstrated good  understanding of the education provided.     See EMR under Patient Instructions for exercises provided 04/06/2023 .    Assessment   Improved pain after physical therapist session today. Is due for another round of pelvic floor botox as pelvic pain is becoming limiting again. Scar mobility has improved.     Fredis Is progressing well towards his goals.   Pt prognosis is Excellent.     Pt will continue to benefit from skilled outpatient physical therapy to address the deficits listed in the problem list box on initial evaluation, provide pt/family education and to  maximize pt's level of independence in the home and community environment.     Pt's spiritual, cultural and educational needs considered and pt agreeable to plan of care and goals.     Anticipated barriers to physical therapy: none     Goals:  Short Term Goals:   Patient will be compliant with home exercise program. Goal no longer met - limited by left hip pain  Patient will be able to ambulate on TM x 5 min without increased pain. Goal met - 05/17/2023   Patient will report being able to stand for up to 15 min without increased back pain. Goal no longer met - limited by left hip pain     Long Term Goals:   Patient will be independent with progressive home exercise program. Goal no longer met - limited by left hip pain  Patient will report tolerating going to the gym at least 2-3 times a week. Goal not met - progressing  Patient will be able to ambulate on TM x 10 min without increased back pain. Goal no longer met - limited by left hip pain    New Long Term Goals: 6 weeks  Patient will be able to tolerate riding in his boat without left hip pain. Goal not met - progressing  Patient will demonstrate bilateral hip extension to 10* to allow for improved gait mechanics. Goal not met - progressing  Patient will deny back pain. Goal met - 07/26/2023   Patient will demonstrate lumbar spine ROM without pain. Goal not met - progressing    Plan     Continue with progressive strength and mobility training to prevent further functional decline and pain. Patient does benefit from manual therapy for left hip ROM. Continue to encourage walking and strength training at the gym.     Jean-Claude Cordoba, PT

## 2023-08-23 ENCOUNTER — CLINICAL SUPPORT (OUTPATIENT)
Dept: REHABILITATION | Facility: HOSPITAL | Age: 53
End: 2023-08-23
Attending: INTERNAL MEDICINE
Payer: MEDICARE

## 2023-08-23 DIAGNOSIS — R27.8 COORDINATION ABNORMAL: ICD-10-CM

## 2023-08-23 DIAGNOSIS — G89.29 CHRONIC LEFT-SIDED LOW BACK PAIN WITHOUT SCIATICA: Primary | ICD-10-CM

## 2023-08-23 DIAGNOSIS — M54.50 CHRONIC LEFT-SIDED LOW BACK PAIN WITHOUT SCIATICA: Primary | ICD-10-CM

## 2023-08-23 DIAGNOSIS — N81.89 PELVIC FLOOR WEAKNESS: ICD-10-CM

## 2023-08-23 DIAGNOSIS — R53.1 WEAKNESS GENERALIZED: ICD-10-CM

## 2023-08-23 PROCEDURE — 97530 THERAPEUTIC ACTIVITIES: CPT | Mod: PN | Performed by: PHYSICAL THERAPIST

## 2023-08-23 PROCEDURE — 97140 MANUAL THERAPY 1/> REGIONS: CPT | Mod: PN | Performed by: PHYSICAL THERAPIST

## 2023-08-23 NOTE — PROGRESS NOTES
"  Pelvic Health Physical Therapy   Treatment Note      Name: Fredis Ugarte  Clinic Number: [id number]    Therapy Diagnosis:        Encounter Diagnoses   Name Primary?    Chronic left-sided low back pain without sciatica Yes    Pelvic floor weakness      Weakness generalized      Coordination abnormal      Physician: Kinga Lee, *    Visit Date: 8/23/2023    Physician Orders: PT Eval and Treat  Medical Diagnosis from Referral: N81.89 (ICD-10-CM) - Pelvic floor weakness   Evaluation Date: 3/9/2023  Authorization Period Expiration: 3/1/2024  Plan of Care Expiration: 10/19/2023  Progress note: 8/24/2023  Visit # 17/30 Visits authorized: 16/17    Cancelled Visits: 0  No Show Visits: 0    Time In: 2:33 PM   Time Out: 3:16 PM     Total Billable Time: 43 minutes    Precautions: Standard, cancer, and extensive abdominal surgery    Subjective   Pt reports: Went the gym on Monday. If he sits too long his back locks up. "Whatever you are doing to my back is really helping me!"    He was compliant with home exercise program.  Response to previous treatment: none  Functional change: none    Pain in:  3-4/10  left hip pain  Pain out: 3-4/10 left hip pain  Location:  left hip    Objective   Fredis received the following manual therapy techniques: to develop flexibility for 33 minutes including: soft tissue mobilization of the bilateral psoas, quadratus lumborum, left lower lumbar paraspinals, and bilateral hip rotation, bilateral single knee to chest and bilateral piriformis PROM. Soft tissue massage to his abdominal scar. Patient has been compliant with home scar massage.     Fredis participated in dynamic functional therapeutic activities to improve functional performance for 10  minutes, including:  Continued to encourage exercise.       Intervention Eval  06/28/2023 07/12/2023 08/02/2023    TherEx Pelvic tilts       TherEx Bridges       TherEx Trunk ROM with ball standing        TherEx HS ROM seated        TherEx " Glut squeeze - on bolster       TherEx SAQ       TherEx Hip adduction with ball       TherEx Standing hip extension       TherEx LTR    X    TherEx HS curls       TherEx Hip abd hooklying       NeuroRe-ed RUSI       ManualTx Scar massage   soft tissue mobilization of the left psoas, quadratus lumborum, left lower lumbar paraspinals, and bilateral hip rotation, bilateral single knee to chest and bilateral piriformis PROM. Soft tissue massage to his abdominal scar.     TherAct Bowel habits       TherEx TM       TherEx Scapular retraction       TherEx Bilateral external rotation arms       TherEx LAQ       TherEx Shoulder shrugs       TherEx Bicep curls       TherEx Baldev pose       TherEx SKTC    X    TherEx Piriformis stretch    X    TherEx DKTC with ball       TherEx Wall squats/sit-stand       TherEx Open book stretch  X      TherEx Trunk rotation with UE abd stretch  X        Home Exercises Provided and Patient Education Provided     Education provided:   - posture/body mechanices  Discussed progression of plan of care with patient; educated pt in activity modification; reviewed HEP with pt. Pt demonstrated and verbalized understanding of all instruction and was provided with a handout of HEP (see Patient Instructions).    Written Home Exercises Provided: yes.  Exercises were reviewed and Fredis was able to demonstrate them prior to the end of the session.  Fredis demonstrated good  understanding of the education provided.     See EMR under Patient Instructions for exercises provided 04/06/2023 .    Assessment   Improved pain after physical therapist session today. Is due for another round of pelvic floor botox as pelvic pain is becoming limiting again. Scar mobility has improved.     Fredis Is progressing well towards his goals.   Pt prognosis is Excellent.     Pt will continue to benefit from skilled outpatient physical therapy to address the deficits listed in the problem list box on initial evaluation, provide  pt/family education and to maximize pt's level of independence in the home and community environment.     Pt's spiritual, cultural and educational needs considered and pt agreeable to plan of care and goals.     Anticipated barriers to physical therapy: none     Goals:  Short Term Goals:   Patient will be compliant with home exercise program. Goal no longer met - limited by left hip pain  Patient will be able to ambulate on TM x 5 min without increased pain. Goal met - 05/17/2023   Patient will report being able to stand for up to 15 min without increased back pain. Goal no longer met - limited by left hip pain     Long Term Goals:   Patient will be independent with progressive home exercise program. Goal no longer met - limited by left hip pain  Patient will report tolerating going to the gym at least 2-3 times a week. Goal not met - progressing  Patient will be able to ambulate on TM x 10 min without increased back pain. Goal no longer met - limited by left hip pain    New Long Term Goals: 6 weeks  Patient will be able to tolerate riding in his boat without left hip pain. Goal not met - progressing  Patient will demonstrate bilateral hip extension to 10* to allow for improved gait mechanics. Goal not met - progressing  Patient will deny back pain. Goal met - 07/26/2023   Patient will demonstrate lumbar spine ROM without pain. Goal not met - progressing    Plan     Continue with progressive strength and mobility training to prevent further functional decline and pain. Patient does benefit from manual therapy for left hip ROM. Continue to encourage walking and strength training at the gym.     Jean-Claude Cordoba, PT

## 2023-08-28 ENCOUNTER — PATIENT MESSAGE (OUTPATIENT)
Dept: SURGERY | Facility: CLINIC | Age: 53
End: 2023-08-28
Payer: MEDICARE

## 2023-08-28 DIAGNOSIS — M62.838 LEVATOR SPASM: Primary | ICD-10-CM

## 2023-09-13 ENCOUNTER — CLINICAL SUPPORT (OUTPATIENT)
Dept: REHABILITATION | Facility: HOSPITAL | Age: 53
End: 2023-09-13
Payer: MEDICARE

## 2023-09-13 DIAGNOSIS — R27.8 COORDINATION ABNORMAL: ICD-10-CM

## 2023-09-13 DIAGNOSIS — G89.29 CHRONIC LEFT-SIDED LOW BACK PAIN WITHOUT SCIATICA: Primary | ICD-10-CM

## 2023-09-13 DIAGNOSIS — M54.50 CHRONIC LEFT-SIDED LOW BACK PAIN WITHOUT SCIATICA: Primary | ICD-10-CM

## 2023-09-13 DIAGNOSIS — N81.89 PELVIC FLOOR WEAKNESS: ICD-10-CM

## 2023-09-13 DIAGNOSIS — R53.1 WEAKNESS GENERALIZED: ICD-10-CM

## 2023-09-13 PROCEDURE — 97530 THERAPEUTIC ACTIVITIES: CPT | Mod: PN | Performed by: PHYSICAL THERAPIST

## 2023-09-13 PROCEDURE — 97140 MANUAL THERAPY 1/> REGIONS: CPT | Mod: PN | Performed by: PHYSICAL THERAPIST

## 2023-09-13 NOTE — PROGRESS NOTES
Pelvic Health Physical Therapy   Treatment Note and Progress Note     Name: Fredis Ugarte  Clinic Number: [id number]    Therapy Diagnosis:        Encounter Diagnoses   Name Primary?    Chronic left-sided low back pain without sciatica Yes    Pelvic floor weakness      Weakness generalized      Coordination abnormal      Physician: Kinga Lee, *    Visit Date: 9/13/2023    Physician Orders: PT Eval and Treat  Medical Diagnosis from Referral: N81.89 (ICD-10-CM) - Pelvic floor weakness   Evaluation Date: 3/9/2023  Authorization Period Expiration: 3/1/2024  Plan of Care Expiration: 10/19/2023  Progress note: 10/12/2023  Visit # 18/30 Visits authorized: 17/37    Cancelled Visits: 0  No Show Visits: 0    Time In: 2:34 PM   Time Out: 3:15 PM   Total Billable Time: 41 minutes    Precautions: Standard, cancer, and extensive abdominal surgery    Subjective   Pt reports: Core pain due to being on the boat. Sees Dr. Lee on 9-22-23.  States that he has a hard time keeping his balance on his small boat. Puts a lot of demand on his core and causes pain. He does admit to two falls in the past week without injury and he was able to get up off of the ground. [place] large hole.     He was compliant with home exercise program.  Response to previous treatment: none  Functional change: none    Pain in:  not obtained/10  left hip pain  Pain out: 5/10 left hip pain  Location:  left hip    Objective   Fredis received the following manual therapy techniques: to develop flexibility for 30 minutes including:  bilateral single knee to chest and bilateral piriformis PROM. Soft tissue massage to his abdominal scar. Trigger point release to the left psoas.     Fredis participated in dynamic functional therapeutic activities to improve functional performance for 11 minutes, including:  Discussed a light weight abdominal support to assist with abdominal pain. Discussed a belt of some sort to assist  with stability on his boat.       Intervention Eval  06/28/2023 07/12/2023 08/02/2023    TherEx Pelvic tilts       TherEx Bridges       TherEx Trunk ROM with ball standing        TherEx HS ROM seated        TherEx Glut squeeze - on bolster       TherEx SAQ       TherEx Hip adduction with ball       TherEx Standing hip extension       TherEx LTR    X    TherEx HS curls       TherEx Hip abd hooklying       NeuroRe-ed RUSI       ManualTx Scar massage   soft tissue mobilization of the left psoas, quadratus lumborum, left lower lumbar paraspinals, and bilateral hip rotation, bilateral single knee to chest and bilateral piriformis PROM. Soft tissue massage to his abdominal scar.     TherAct Bowel habits       TherEx TM       TherEx Scapular retraction       TherEx Bilateral external rotation arms       TherEx LAQ       TherEx Shoulder shrugs       TherEx Bicep curls       TherEx Baldev pose       TherEx SKTC    X    TherEx Piriformis stretch    X    TherEx DKTC with ball       TherEx Wall squats/sit-stand       TherEx Open book stretch  X      TherEx Trunk rotation with UE abd stretch  X        Home Exercises Provided and Patient Education Provided     Education provided:   - posture/body mechanices  Discussed progression of plan of care with patient; educated pt in activity modification; reviewed HEP with pt. Pt demonstrated and verbalized understanding of all instruction and was provided with a handout of HEP (see Patient Instructions).    Written Home Exercises Provided: yes.  Exercises were reviewed and Fredis was able to demonstrate them prior to the end of the session.  Fredis demonstrated good  understanding of the education provided.     See EMR under Patient Instructions for exercises provided 04/06/2023 .    Assessment   Limited by abdominal pain today.     Fredis Is progressing well towards his goals.   Pt prognosis is Excellent.     Pt will continue to benefit from skilled outpatient physical therapy to address  the deficits listed in the problem list box on initial evaluation, provide pt/family education and to maximize pt's level of independence in the home and community environment.     Pt's spiritual, cultural and educational needs considered and pt agreeable to plan of care and goals.     Anticipated barriers to physical therapy: none     Goals:  Short Term Goals:   Patient will be compliant with home exercise program. Goal no longer met - limited by left hip pain  Patient will be able to ambulate on TM x 5 min without increased pain. Goal met - 05/17/2023   Patient will report being able to stand for up to 15 min without increased back pain. Goal no longer met - limited by left hip pain     Long Term Goals:   Patient will be independent with progressive home exercise program. Goal no longer met - limited by left hip pain  Patient will report tolerating going to the gym at least 2-3 times a week. Goal not met - progressing  Patient will be able to ambulate on TM x 10 min without increased back pain. Goal no longer met - limited by left hip pain    New Long Term Goals: 6 weeks  Patient will be able to tolerate riding in his boat without left hip pain. Goal not met - progressing  Patient will demonstrate bilateral hip extension to 10* to allow for improved gait mechanics. Goal not met - progressing  Patient will deny back pain. Goal met - 07/26/2023   Patient will demonstrate lumbar spine ROM without pain. Goal not met - progressing    Plan     Continue with progressive strength and mobility training to prevent further functional decline and pain. Patient does benefit from manual therapy for left hip ROM. Continue to encourage walking and strength training at the gym.     Jean-Claude Cordoba, PT

## 2023-09-21 ENCOUNTER — HOSPITAL ENCOUNTER (OUTPATIENT)
Dept: RADIOLOGY | Facility: HOSPITAL | Age: 53
Discharge: HOME | End: 2023-09-21
Attending: INTERNAL MEDICINE
Payer: MEDICARE

## 2023-09-21 DIAGNOSIS — C18.4 MALIGNANT NEOPLASM OF TRANSVERSE COLON: ICD-10-CM

## 2023-09-21 DIAGNOSIS — C78.6 SECONDARY MALIGNANT NEOPLASM OF RETROPERITONEUM AND PERITONEUM: ICD-10-CM

## 2023-09-21 LAB — GLUCOSE SERPL-MCNC: 89 MG/DL (ref 70–110)

## 2023-09-21 PROCEDURE — 78815 PET IMAGE W/CT SKULL-THIGH: CPT | Mod: 26,PS,, | Performed by: RADIOLOGY

## 2023-09-21 PROCEDURE — 78815 NM PET CT ROUTINE: ICD-10-PCS | Mod: 26,PS,, | Performed by: RADIOLOGY

## 2023-09-21 PROCEDURE — A9552 F18 FDG: HCPCS | Mod: PN

## 2023-09-21 NOTE — PROGRESS NOTES
PET Imaging Questionnaire    Are you a Diabetic? Recent Blood Sugar level? Yes    Are you anemic? Bone Marrow Stimulation Meds? Yes    Have you had a CT Scan, if so when & where was your last one? Yes -     Have you had a PET Scan, if so when & where was your last one? Yes -     Chemotherapy or currently on Chemotherapy? Yes    Radiation therapy? Yes    Surgical History:   Past Surgical History:   Procedure Laterality Date    ADENOIDECTOMY      ANOSCOPY N/A 2/24/2022    Procedure: ANOSCOPY;  Surgeon: KARIN Mittal MD;  Location: [facility] ENDO;  Service: Endoscopy;  Laterality: N/A;  WITH BOTOX    APPLICATION OF WOUND VACUUM-ASSISTED CLOSURE DEVICE N/A 10/27/2021    Procedure: APPLICATION, WOUND VAC;  Surgeon: KARIN Mittal MD;  Location: [facility] OR 2ND FLR;  Service: Colon and Rectal;  Laterality: N/A;    COLON SURGERY      2 colon resections    COLONOSCOPY N/A 2/2/2023    Procedure: COLONOSCOPY;  Surgeon: KARIN Mittal MD;  Location: [facility] ENDO;  Service: Endoscopy;  Laterality: N/A;  in January or February per his request please.  Thank you!    EXAMINATION UNDER ANESTHESIA N/A 10/26/2020    Procedure: Exam under anesthesia, flex sig, botox injection, lithotomy;  Surgeon: KARIN Mittal MD;  Location: [facility] OR 2ND FLR;  Service: Endoscopy;  Laterality: N/A;    EXAMINATION UNDER ANESTHESIA N/A 08/11/2021    Procedure: Exam under anesthesia, lithotomy, botox injection;  Surgeon: KARIN Mittal MD;  Location: [facility] OR 2ND FLR;  Service: Endoscopy;  Laterality: N/A;    EXCISION OF MASS OF ABDOMEN N/A 12/28/2020    Procedure: EXCISION, MASS, ABDOMEN;  Surgeon: KARIN Mittal MD;  Location: [facility] OR 2ND FLR;  Service: Colon and Rectal;  Laterality: N/A;    FLEXIBLE SIGMOIDOSCOPY  10/26/2020    Procedure: SIGMOIDOSCOPY, FLEXIBLE;  Surgeon: KARIN Mittal MD;  Location: [facility] OR 2ND FLR;  Service: Endoscopy;;    FLEXIBLE SIGMOIDOSCOPY N/A 08/11/2021    Procedure: SIGMOIDOSCOPY, FLEXIBLE;   Surgeon: KARIN Mittal MD;  [facility];  Service: Endoscopy;  Laterality: N/A;    FLEXIBLE SIGMOIDOSCOPY N/A 9/22/2022    Procedure: Anoscopy with botox;  Surgeon: KARIN Mittal MD;  Location: [facility] ENDO;  Service: Endoscopy;  Laterality: N/A;    INJECTION OF BOTULINUM TOXIN TYPE A  10/26/2020    Procedure: INJECTION, BOTULINUM TOXIN, TYPE A;  Surgeon: KARIN Mittal MD;  Location: [facility] OR [facility] FLR;  Service: Endoscopy;;    INJECTION OF BOTULINUM TOXIN TYPE A  08/11/2021    Procedure: INJECTION, BOTULINUM TOXIN, TYPE A;  Surgeon: KARIN Mittal MD;  Location: [facility] OR [facility] FLR;  Service: Endoscopy;;    INJECTION OF BOTULINUM TOXIN TYPE A N/A 2/24/2022    Procedure: INJECTION, BOTULINUM TOXIN, TYPE A;  Surgeon: KARIN Mittal MD;  Location: [facility] ENDO;  Service: Endoscopy;  Laterality: N/A;    INJECTION OF BOTULINUM TOXIN TYPE A  2/2/2023    Procedure: INJECTION, BOTULINUM TOXIN, TYPE A;  Surgeon: KARIN Mittal MD;  Location: [facility] ENDO;  Service: Endoscopy;;  rectum      TONSILLECTOMY      WOUND DEBRIDEMENT N/A 10/27/2021    Procedure: DEBRIDEMENT, WOUND,;  Surgeon: KARIN Mittal MD;  [facility];  Service: Colon and Rectal;  Laterality: N/A;        Have you been fasting for at least 6 hours? Yes    Is there any chance you may be pregnant or breastfeeding? No    Assay: 13.29 MCi@:10:18   Injection Site:RT Hand    Residual: .757 mCi@: 10:20   Technologist: Mian Regalado Injected:12.53 mCi

## 2023-09-27 ENCOUNTER — CLINICAL SUPPORT (OUTPATIENT)
Dept: REHABILITATION | Facility: HOSPITAL | Age: 53
End: 2023-09-27
Attending: INTERNAL MEDICINE
Payer: MEDICARE

## 2023-09-27 DIAGNOSIS — N81.89 PELVIC FLOOR WEAKNESS: ICD-10-CM

## 2023-09-27 DIAGNOSIS — R53.1 WEAKNESS GENERALIZED: ICD-10-CM

## 2023-09-27 DIAGNOSIS — M54.50 CHRONIC LEFT-SIDED LOW BACK PAIN WITHOUT SCIATICA: Primary | ICD-10-CM

## 2023-09-27 DIAGNOSIS — G89.29 CHRONIC LEFT-SIDED LOW BACK PAIN WITHOUT SCIATICA: Primary | ICD-10-CM

## 2023-09-27 DIAGNOSIS — R27.8 COORDINATION ABNORMAL: ICD-10-CM

## 2023-09-27 PROCEDURE — 97140 MANUAL THERAPY 1/> REGIONS: CPT | Mod: PN | Performed by: PHYSICAL THERAPIST

## 2023-09-27 PROCEDURE — 97530 THERAPEUTIC ACTIVITIES: CPT | Mod: PN | Performed by: PHYSICAL THERAPIST

## 2023-09-27 NOTE — PROGRESS NOTES
Pelvic Health Physical Therapy   Treatment Note      Name: Fredis Ugarte  Clinic Number: [id number]    Therapy Diagnosis:        Encounter Diagnoses   Name Primary?    Chronic left-sided low back pain without sciatica Yes    Pelvic floor weakness      Weakness generalized      Coordination abnormal      Physician: Kinga Lee, *    Visit Date: 9/27/2023    Physician Orders: PT Eval and Treat  Medical Diagnosis from Referral: N81.89 (ICD-10-CM) - Pelvic floor weakness   Evaluation Date: 3/9/2023  Authorization Period Expiration: 3/1/2024  Plan of Care Expiration: 10/19/2023  Progress note: 10/12/2023  Visit # 19/30 Visits authorized: 18/37    Cancelled Visits: 0  No Show Visits: 0    Time In: 2:34 PM   Time Out: 3:10 PM   Total Billable Time: 36 minutes    Precautions: Standard, cancer, and extensive abdominal surgery    Subjective   Pt reports: patient states that he has cancer mets to his lung. He just found this out on his last scans. The plan is to undergo high dose radiation.     He was compliant with home exercise program.  Response to previous treatment: none  Functional change: none    Pain in:  not obtained/10  left hip pain  Pain out: not obtained/10 left hip pain  Location:  left hip    Objective   Fredis received the following manual therapy techniques: to develop flexibility for 25 minutes including:  bilateral single knee to chest and bilateral piriformis PROM. Soft tissue massage to his abdominal scar.     Fredis participated in dynamic functional therapeutic activities to improve functional performance for 11 minutes, including:  Encouraged continued mobility through his next treatment.       Intervention Eval  06/28/2023 07/12/2023 08/02/2023    TherEx Pelvic tilts       TherEx Bridges       TherEx Trunk ROM with ball standing        TherEx HS ROM seated        TherEx Glut squeeze - on bolster       TherEx SAQ       TherEx Hip adduction with ball       TherEx Standing hip extension        TherEx LTR    X    TherEx HS curls       TherEx Hip abd hooklying       NeuroRe-ed RUSI       ManualTx Scar massage   soft tissue mobilization of the left psoas, quadratus lumborum, left lower lumbar paraspinals, and bilateral hip rotation, bilateral single knee to chest and bilateral piriformis PROM. Soft tissue massage to his abdominal scar.     TherAct Bowel habits       TherEx TM       TherEx Scapular retraction       TherEx Bilateral external rotation arms       TherEx LAQ       TherEx Shoulder shrugs       TherEx Bicep curls       TherEx Baldev pose       TherEx SKTC    X    TherEx Piriformis stretch    X    TherEx DKTC with ball       TherEx Wall squats/sit-stand       TherEx Open book stretch  X      TherEx Trunk rotation with UE abd stretch  X        Home Exercises Provided and Patient Education Provided     Education provided:   - posture/body mechanices  Discussed progression of plan of care with patient; educated pt in activity modification; reviewed HEP with pt. Pt demonstrated and verbalized understanding of all instruction and was provided with a handout of HEP (see Patient Instructions).    Written Home Exercises Provided: yes.  Exercises were reviewed and Fredis was able to demonstrate them prior to the end of the session.  Fredis demonstrated good  understanding of the education provided.     See EMR under Patient Instructions for exercises provided 04/06/2023 .    Assessment   Pain has been fairly controled, Benefiting from physical therapy to improve and maintain his hip mobility.     Fredis Is progressing well towards his goals.   Pt prognosis is Excellent.     Pt will continue to benefit from skilled outpatient physical therapy to address the deficits listed in the problem list box on initial evaluation, provide pt/family education and to maximize pt's level of independence in the home and community environment.     Pt's spiritual, cultural and educational needs considered and pt agreeable to  plan of care and goals.     Anticipated barriers to physical therapy: none     Goals:  Short Term Goals:   Patient will be compliant with home exercise program. Goal no longer met - limited by left hip pain  Patient will be able to ambulate on TM x 5 min without increased pain. Goal met - 05/17/2023   Patient will report being able to stand for up to 15 min without increased back pain. Goal no longer met - limited by left hip pain     Long Term Goals:   Patient will be independent with progressive home exercise program. Goal no longer met - limited by left hip pain  Patient will report tolerating going to the gym at least 2-3 times a week. Goal not met - progressing  Patient will be able to ambulate on TM x 10 min without increased back pain. Goal no longer met - limited by left hip pain    New Long Term Goals: 6 weeks  Patient will be able to tolerate riding in his boat without left hip pain. Goal not met - progressing  Patient will demonstrate bilateral hip extension to 10* to allow for improved gait mechanics. Goal not met - progressing  Patient will deny back pain. Goal met - 07/26/2023   Patient will demonstrate lumbar spine ROM without pain. Goal not met - progressing    Plan     Continue with progressive strength and mobility training to prevent further functional decline and pain. Patient does benefit from manual therapy for left hip ROM. Continue to encourage walking and strength training at the gym.     Jean-Claude Cordoba, PT

## 2024-01-11 ENCOUNTER — HOSPITAL ENCOUNTER (OUTPATIENT)
Dept: RADIOLOGY | Facility: HOSPITAL | Age: 54
Discharge: HOME | End: 2024-01-11
Attending: INTERNAL MEDICINE
Payer: MEDICARE

## 2024-01-11 DIAGNOSIS — C78.6 SECONDARY MALIGNANT NEOPLASM OF RETROPERITONEUM AND PERITONEUM: ICD-10-CM

## 2024-01-11 DIAGNOSIS — C18.4 MALIGNANT NEOPLASM OF TRANSVERSE COLON: ICD-10-CM

## 2024-01-11 LAB — GLUCOSE SERPL-MCNC: 89 MG/DL (ref 70–110)

## 2024-01-11 PROCEDURE — 78815 PET IMAGE W/CT SKULL-THIGH: CPT | Mod: TC,PS,PN

## 2024-01-11 PROCEDURE — A9552 F18 FDG: HCPCS | Mod: PN

## 2024-01-11 PROCEDURE — 78815 PET IMAGE W/CT SKULL-THIGH: CPT | Mod: 26,PS,, | Performed by: RADIOLOGY

## 2024-01-11 NOTE — PROGRESS NOTES
PET Imaging Questionnaire    Are you a Diabetic? Recent Blood Sugar level? Yes    Are you anemic? Bone Marrow Stimulation Meds? No    Have you had a CT Scan, if so when & where was your last one? Yes -     Have you had a PET Scan, if so when & where was your last one? Yes -     Chemotherapy or currently on Chemotherapy? Yes    Radiation therapy? Yes    Surgical History:   Past Surgical History:   Procedure Laterality Date    ADENOIDECTOMY      ANOSCOPY N/A 02/24/2022    Procedure: ANOSCOPY;  Surgeon: KARIN Mittal MD;  Location: [facility] ENDO;  Service: Endoscopy;  Laterality: N/A;  WITH BOTOX    APPLICATION OF WOUND VACUUM-ASSISTED CLOSURE DEVICE N/A 10/27/2021    Procedure: APPLICATION, WOUND VAC;  Surgeon: KARIN Mittal MD;  Location: [facility] OR 2ND FLR;  Service: Colon and Rectal;  Laterality: N/A;    COLON SURGERY      2 colon resections    COLONOSCOPY N/A 02/02/2023    Procedure: COLONOSCOPY;  Surgeon: KARIN Mittal MD;  Location: [facility] ENDO;  Service: Endoscopy;  Laterality: N/A;  in January or February per his request please.  Thank you!    ENDOBRONCHIAL ULTRASOUND Bilateral 10/9/2023    Procedure: ENDOBRONCHIAL ULTRASOUND (EBUS);  Surgeon: Carlos Alberto Galdamez MD;  Location: [facility] OR;  Service: Pulmonary;  Laterality: Bilateral;    EXAMINATION UNDER ANESTHESIA N/A 10/26/2020    Procedure: Exam under anesthesia, flex sig, botox injection, lithotomy;  Surgeon: KARIN Mittal MD;  Location: [facility] OR 2ND FLR;  Service: Endoscopy;  Laterality: N/A;    EXAMINATION UNDER ANESTHESIA N/A 08/11/2021    Procedure: Exam under anesthesia, lithotomy, botox injection;  Surgeon: KARIN Mittal MD;  Location: [facility] OR 2ND FLR;  Service: Endoscopy;  Laterality: N/A;    EXCISION OF MASS OF ABDOMEN N/A 12/28/2020    Procedure: EXCISION, MASS, ABDOMEN;  Surgeon: KARIN Mittal MD;  Location: [facility] OR 2ND FLR;  Service: Colon and Rectal;  Laterality: N/A;    FLEXIBLE SIGMOIDOSCOPY  10/26/2020    Procedure:  SIGMOIDOSCOPY, FLEXIBLE;  Surgeon: KARIN Mittal MD;  Location: [facility] OR 2ND FLR;  Service: Endoscopy;;    FLEXIBLE SIGMOIDOSCOPY N/A 08/11/2021    Procedure: SIGMOIDOSCOPY, FLEXIBLE;  Surgeon: KARIN Mittal MD;  [facility];  Service: Endoscopy;  Laterality: N/A;    FLEXIBLE SIGMOIDOSCOPY N/A 09/22/2022    Procedure: Anoscopy with botox;  Surgeon: KARIN Mittal MD;  Location: [facility] ENDO;  Service: Endoscopy;  Laterality: N/A;    FLEXIBLE SIGMOIDOSCOPY N/A 9/28/2023    Procedure: SIGMOIDOSCOPY, FLEXIBLE;  Surgeon: KARIN Mittal MD;  Location: [facility] ENDO;  Service: Endoscopy;  Laterality: N/A;  anoscopy with botox    INJECTION OF BOTULINUM TOXIN TYPE A  10/26/2020    Procedure: INJECTION, BOTULINUM TOXIN, TYPE A;  Surgeon: KARIN Mittal MD;  [facility];  Service: Endoscopy;;    INJECTION OF BOTULINUM TOXIN TYPE A  08/11/2021    Procedure: INJECTION, BOTULINUM TOXIN, TYPE A;  Surgeon: KARIN Mittal MD;  [facility];  Service: Endoscopy;;    INJECTION OF BOTULINUM TOXIN TYPE A N/A 02/24/2022    Procedure: INJECTION, BOTULINUM TOXIN, TYPE A;  Surgeon: KARIN Mittal MD;  Location: [facility] ENDO;  Service: Endoscopy;  Laterality: N/A;    INJECTION OF BOTULINUM TOXIN TYPE A  02/02/2023    Procedure: INJECTION, BOTULINUM TOXIN, TYPE A;  Surgeon: KARIN Mittal MD;  Location: [facility] ENDO;  Service: Endoscopy;;  rectum      PORTACATH PLACEMENT Right     Right Chest Wall    ROBOTIC BRONCHOSCOPY Bilateral 10/9/2023    Procedure: ROBOTIC BRONCHOSCOPY;  Surgeon: Carlos Alberto Galdamez MD;  Location: [facility] OR;  Service: Pulmonary;  Laterality: Bilateral;    TONSILLECTOMY      WOUND DEBRIDEMENT N/A 10/27/2021    Procedure: DEBRIDEMENT, WOUND,;  Surgeon: KARIN Mittal MD;  [facility] 2ND FLR;  Service: Colon and Rectal;  Laterality: N/A;        Have you been fasting for at least 6 hours? Yes    Is there any chance you may be pregnant or  breastfeeding? No    Assay: 13.6 MCi@:10.20   Injection Site:rt hand     Residual: .846 mCi@: 10.22   Technologist: Marcy Regalado Injected:12.75mCi

## 2024-04-11 ENCOUNTER — HOSPITAL ENCOUNTER (OUTPATIENT)
Dept: RADIOLOGY | Facility: HOSPITAL | Age: 54
Discharge: HOME | End: 2024-04-11
Attending: INTERNAL MEDICINE
Payer: MEDICARE

## 2024-04-11 DIAGNOSIS — C18.4 MALIGNANT NEOPLASM OF TRANSVERSE COLON: ICD-10-CM

## 2024-04-11 DIAGNOSIS — E61.1 IRON DEFICIENCY: ICD-10-CM

## 2024-04-11 DIAGNOSIS — E66.9 OBESITY, UNSPECIFIED: ICD-10-CM

## 2024-04-11 DIAGNOSIS — C78.6 SECONDARY MALIGNANT NEOPLASM OF RETROPERITONEUM AND PERITONEUM: ICD-10-CM

## 2024-04-11 DIAGNOSIS — Z45.2 ENCOUNTER FOR ADJUSTMENT OR MANAGEMENT OF VASCULAR ACCESS DEVICE: ICD-10-CM

## 2024-04-11 LAB — GLUCOSE SERPL-MCNC: 89 MG/DL (ref 70–110)

## 2024-04-11 PROCEDURE — 78815 PET IMAGE W/CT SKULL-THIGH: CPT | Mod: 26,PI,, | Performed by: STUDENT IN AN ORGANIZED HEALTH CARE EDUCATION/TRAINING PROGRAM

## 2024-04-11 PROCEDURE — A9552 F18 FDG: HCPCS | Mod: PN | Performed by: INTERNAL MEDICINE

## 2024-04-11 PROCEDURE — 78815 PET IMAGE W/CT SKULL-THIGH: CPT | Mod: TC,PS,PN

## 2024-04-11 RX ORDER — FLUDEOXYGLUCOSE F18 500 MCI/ML
12 INJECTION INTRAVENOUS
Status: COMPLETED | OUTPATIENT
Start: 2024-04-11 | End: 2024-04-11

## 2024-04-11 RX ADMIN — FLUDEOXYGLUCOSE F-18 12.9 MILLICURIE: 500 INJECTION INTRAVENOUS at 11:04

## 2024-04-11 NOTE — PROGRESS NOTES
PET Imaging Questionnaire    Are you a Diabetic? Recent Blood Sugar level? Yes    Are you anemic? Bone Marrow Stimulation Meds? Yes    Have you had a CT Scan, if so when & where was your last one? Yes -     Have you had a PET Scan, if so when & where was your last one? Yes -     Chemotherapy or currently on Chemotherapy? Yes    Radiation therapy? Yes    Surgical History:   Past Surgical History:   Procedure Laterality Date    ADENOIDECTOMY      ANOSCOPY N/A 02/24/2022    Procedure: ANOSCOPY;  Surgeon: KARIN Mittal MD;  Location: [facility] ENDO;  Service: Endoscopy;  Laterality: N/A;  WITH BOTOX    APPLICATION OF WOUND VACUUM-ASSISTED CLOSURE DEVICE N/A 10/27/2021    Procedure: APPLICATION, WOUND VAC;  Surgeon: KARIN Mittal MD;  Location: [facility] OR 2ND FLR;  Service: Colon and Rectal;  Laterality: N/A;    COLON SURGERY      2 colon resections    COLONOSCOPY N/A 02/02/2023    Procedure: COLONOSCOPY;  Surgeon: KARIN Mittal MD;  Location: [facility] ENDO;  Service: Endoscopy;  Laterality: N/A;  in January or February per his request please.  Thank you!    ENDOBRONCHIAL ULTRASOUND Bilateral 10/9/2023    Procedure: ENDOBRONCHIAL ULTRASOUND (EBUS);  Surgeon: Carlos Alberto Galdamez MD;  Location: [facility] OR;  Service: Pulmonary;  Laterality: Bilateral;    EXAMINATION UNDER ANESTHESIA N/A 10/26/2020    Procedure: Exam under anesthesia, flex sig, botox injection, lithotomy;  Surgeon: KARIN Mittal MD;  Location: [facility] OR 2ND FLR;  Service: Endoscopy;  Laterality: N/A;    EXAMINATION UNDER ANESTHESIA N/A 08/11/2021    Procedure: Exam under anesthesia, lithotomy, botox injection;  Surgeon: KARIN Mittal MD;  Location: [facility] OR 2ND FLR;  Service: Endoscopy;  Laterality: N/A;    EXCISION OF MASS OF ABDOMEN N/A 12/28/2020    Procedure: EXCISION, MASS, ABDOMEN;  Surgeon: KARIN Mittal MD;  Location: [facility] OR 2ND FLR;  Service: Colon and Rectal;  Laterality: N/A;    FLEXIBLE SIGMOIDOSCOPY  10/26/2020     Procedure: SIGMOIDOSCOPY, FLEXIBLE;  Surgeon: KARIN Mittal MD;  [facility];  Service: Endoscopy;;    FLEXIBLE SIGMOIDOSCOPY N/A 08/11/2021    Procedure: SIGMOIDOSCOPY, FLEXIBLE;  Surgeon: KARIN Mittal MD;  [facility];  Service: Endoscopy;  Laterality: N/A;    FLEXIBLE SIGMOIDOSCOPY N/A 09/22/2022    Procedure: Anoscopy with botox;  Surgeon: KARIN Mittal MD;  Location: [facility] ENDO;  Service: Endoscopy;  Laterality: N/A;    FLEXIBLE SIGMOIDOSCOPY N/A 9/28/2023    Procedure: SIGMOIDOSCOPY, FLEXIBLE;  Surgeon: KARIN Mittal MD;  Location: [facility] ENDO;  Service: Endoscopy;  Laterality: N/A;  anoscopy with botox    INJECTION OF BOTULINUM TOXIN TYPE A  10/26/2020    Procedure: INJECTION, BOTULINUM TOXIN, TYPE A;  Surgeon: KARIN Mittal MD;  Location: [facility] OR 2ND FLR;  Service: Endoscopy;;    INJECTION OF BOTULINUM TOXIN TYPE A  08/11/2021    Procedure: INJECTION, BOTULINUM TOXIN, TYPE A;  Surgeon: KARIN Mittal MD;  Location: [facility] OR 2ND FLR;  Service: Endoscopy;;    INJECTION OF BOTULINUM TOXIN TYPE A N/A 02/24/2022    Procedure: INJECTION, BOTULINUM TOXIN, TYPE A;  Surgeon: KARIN Mittal MD;  Location: [facility] ENDO;  Service: Endoscopy;  Laterality: N/A;    INJECTION OF BOTULINUM TOXIN TYPE A  02/02/2023    Procedure: INJECTION, BOTULINUM TOXIN, TYPE A;  Surgeon: KARIN Mittal MD;  Location: [facility] ENDO;  Service: Endoscopy;;  rectum      PORTACATH PLACEMENT Right     Right Chest Wall    ROBOTIC BRONCHOSCOPY Bilateral 10/9/2023    Procedure: ROBOTIC BRONCHOSCOPY;  Surgeon: Carlos Alberto Galdamez MD;  Location: [facility] OR;  Service: Pulmonary;  Laterality: Bilateral;    TONSILLECTOMY      WOUND DEBRIDEMENT N/A 10/27/2021    Procedure: DEBRIDEMENT, WOUND,;  Surgeon: KARIN Mittal MD;  [facility] 2ND FLR;  Service: Colon and Rectal;  Laterality: N/A;        Have you been fasting for at least 6 hours? Yes    Is there any chance you may be  pregnant or breastfeeding? No    Assay: 14 MCi@:11.36   Injection Site:rt hand     Residual: 1.09 mCi@: 11.38   Technologist: Marcy Regalado Injected:12.9mCi

## 2024-07-31 ENCOUNTER — HOSPITAL ENCOUNTER (OUTPATIENT)
Dept: RADIOLOGY | Facility: HOSPITAL | Age: 54
Discharge: HOME | End: 2024-07-31
Attending: INTERNAL MEDICINE
Payer: MEDICARE

## 2024-07-31 DIAGNOSIS — C18.4 MALIGNANT NEOPLASM OF TRANSVERSE COLON: ICD-10-CM

## 2024-07-31 DIAGNOSIS — C78.6 SECONDARY MALIGNANT NEOPLASM OF RETROPERITONEUM AND PERITONEUM: ICD-10-CM

## 2024-07-31 LAB — GLUCOSE SERPL-MCNC: 100 MG/DL (ref 70–110)

## 2024-07-31 PROCEDURE — A9552 F18 FDG: HCPCS | Mod: PN | Performed by: INTERNAL MEDICINE

## 2024-07-31 PROCEDURE — 78815 PET IMAGE W/CT SKULL-THIGH: CPT | Mod: 26,PS,, | Performed by: STUDENT IN AN ORGANIZED HEALTH CARE EDUCATION/TRAINING PROGRAM

## 2024-07-31 PROCEDURE — 78815 PET IMAGE W/CT SKULL-THIGH: CPT | Mod: TC,PN

## 2024-07-31 RX ORDER — FLUDEOXYGLUCOSE F18 500 MCI/ML
12 INJECTION INTRAVENOUS
Status: COMPLETED | OUTPATIENT
Start: 2024-07-31 | End: 2024-07-31

## 2024-07-31 RX ADMIN — FLUDEOXYGLUCOSE F-18 13.6 MILLICURIE: 500 INJECTION INTRAVENOUS at 10:07

## 2024-07-31 NOTE — PROGRESS NOTES
PET Imaging Questionnaire    Are you a Diabetic? Recent Blood Sugar level? No    Are you anemic? Bone Marrow Stimulation Meds? yes    Have you had a CT Scan, if so when & where was your last one? Yes -     Have you had a PET Scan, if so when & where was your last one? Yes -     Chemotherapy or currently on Chemotherapy? Yes    Radiation therapy? Yes    Surgical History:   Past Surgical History:   Procedure Laterality Date    ADENOIDECTOMY      ANOSCOPY N/A 02/24/2022    Procedure: ANOSCOPY;  Surgeon: KARIN Mittal MD;  Location: [facility] ENDO;  Service: Endoscopy;  Laterality: N/A;  WITH BOTOX    APPLICATION OF WOUND VACUUM-ASSISTED CLOSURE DEVICE N/A 10/27/2021    Procedure: APPLICATION, WOUND VAC;  Surgeon: KARIN Mittal MD;  Location: [facility] OR 2ND FLR;  Service: Colon and Rectal;  Laterality: N/A;    COLON SURGERY      2 colon resections    COLONOSCOPY N/A 02/02/2023    Procedure: COLONOSCOPY;  Surgeon: KARIN Mittal MD;  Location: [facility] ENDO;  Service: Endoscopy;  Laterality: N/A;  in January or February per his request please.  Thank you!    ENDOBRONCHIAL ULTRASOUND Bilateral 10/9/2023    Procedure: ENDOBRONCHIAL ULTRASOUND (EBUS);  Surgeon: Carlos Alberto Galdamez MD;  Location: [facility] OR;  Service: Pulmonary;  Laterality: Bilateral;    EXAMINATION UNDER ANESTHESIA N/A 10/26/2020    Procedure: Exam under anesthesia, flex sig, botox injection, lithotomy;  Surgeon: KARIN Mittal MD;  Location: [facility] OR 2ND FLR;  Service: Endoscopy;  Laterality: N/A;    EXAMINATION UNDER ANESTHESIA N/A 08/11/2021    Procedure: Exam under anesthesia, lithotomy, botox injection;  Surgeon: KARIN Mittal MD;  Location: [facility] OR 2ND FLR;  Service: Endoscopy;  Laterality: N/A;    EXCISION OF MASS OF ABDOMEN N/A 12/28/2020    Procedure: EXCISION, MASS, ABDOMEN;  Surgeon: KARIN Mittal MD;  Location: [facility] OR 2ND FLR;  Service: Colon and Rectal;  Laterality: N/A;    FLEXIBLE SIGMOIDOSCOPY  10/26/2020    Procedure:  SIGMOIDOSCOPY, FLEXIBLE;  Surgeon: KARIN Mittal MD;  Location: [facility] OR 2ND FLR;  Service: Endoscopy;;    FLEXIBLE SIGMOIDOSCOPY N/A 08/11/2021    Procedure: SIGMOIDOSCOPY, FLEXIBLE;  Surgeon: KARIN Mittal MD;  [facility];  Service: Endoscopy;  Laterality: N/A;    FLEXIBLE SIGMOIDOSCOPY N/A 09/22/2022    Procedure: Anoscopy with botox;  Surgeon: KARIN Mittal MD;  Location: [facility] ENDO;  Service: Endoscopy;  Laterality: N/A;    FLEXIBLE SIGMOIDOSCOPY N/A 9/28/2023    Procedure: SIGMOIDOSCOPY, FLEXIBLE;  Surgeon: KARIN Mittal MD;  Location: [facility] ENDO;  Service: Endoscopy;  Laterality: N/A;  anoscopy with botox    INJECTION OF BOTULINUM TOXIN TYPE A  10/26/2020    Procedure: INJECTION, BOTULINUM TOXIN, TYPE A;  Surgeon: KARIN Mittal MD;  [facility];  Service: Endoscopy;;    INJECTION OF BOTULINUM TOXIN TYPE A  08/11/2021    Procedure: INJECTION, BOTULINUM TOXIN, TYPE A;  Surgeon: KARIN Mittal MD;  [facility];  Service: Endoscopy;;    INJECTION OF BOTULINUM TOXIN TYPE A N/A 02/24/2022    Procedure: INJECTION, BOTULINUM TOXIN, TYPE A;  Surgeon: KARIN Mittal MD;  Location: [facility] ENDO;  Service: Endoscopy;  Laterality: N/A;    INJECTION OF BOTULINUM TOXIN TYPE A  02/02/2023    Procedure: INJECTION, BOTULINUM TOXIN, TYPE A;  Surgeon: KRAIN Mittal MD;  Location: [facility] ENDO;  Service: Endoscopy;;  rectum      PORTACATH PLACEMENT Right     Right Chest Wall    ROBOTIC BRONCHOSCOPY Bilateral 10/9/2023    Procedure: ROBOTIC BRONCHOSCOPY;  Surgeon: Carlos Alberto Galdamez MD;  Location: [facility] OR;  Service: Pulmonary;  Laterality: Bilateral;    TONSILLECTOMY      WOUND DEBRIDEMENT N/A 10/27/2021    Procedure: DEBRIDEMENT, WOUND,;  Surgeon: KARIN Mittal MD;  [facility] 2ND FLR;  Service: Colon and Rectal;  Laterality: N/A;        Have you been fasting for at least 6 hours? Yes    Is there any chance you may be pregnant or  breastfeeding? No    Assay: 13.96 MCi@:1014   Injection Site:rt hand     Residual: .39 mCi@: 1016   Technologist: Marcy Regalado Injected:13.6mCi

## 2024-10-30 ENCOUNTER — HOSPITAL ENCOUNTER (OUTPATIENT)
Dept: RADIOLOGY | Facility: HOSPITAL | Age: 54
Discharge: HOME | End: 2024-10-30
Attending: INTERNAL MEDICINE
Payer: MEDICARE

## 2024-10-30 DIAGNOSIS — D51.3 OTHER DIETARY VITAMIN B12 DEFICIENCY ANEMIA: ICD-10-CM

## 2024-10-30 DIAGNOSIS — D75.89 OTHER SPECIFIED DISEASES OF BLOOD AND BLOOD-FORMING ORGANS: ICD-10-CM

## 2024-10-30 DIAGNOSIS — R53.83 OTHER FATIGUE: ICD-10-CM

## 2024-10-30 DIAGNOSIS — E61.1 IRON DEFICIENCY: ICD-10-CM

## 2024-10-30 DIAGNOSIS — G89.3 NEOPLASM RELATED PAIN (ACUTE) (CHRONIC): ICD-10-CM

## 2024-10-30 DIAGNOSIS — Z85.038 HISTORY OF MALIGNANT NEOPLASM OF LARGE INTESTINE: ICD-10-CM

## 2024-10-30 DIAGNOSIS — G89.29 OTHER CHRONIC PAIN: ICD-10-CM

## 2024-10-30 DIAGNOSIS — E66.9 OBESITY, UNSPECIFIED: ICD-10-CM

## 2024-10-30 DIAGNOSIS — Z45.2 ENCOUNTER FOR FITTING AND ADJUSTMENT OF VASCULAR CATHETER: ICD-10-CM

## 2024-10-30 DIAGNOSIS — C18.4 MALIGNANT NEOPLASM OF TRANSVERSE COLON: ICD-10-CM

## 2024-10-30 DIAGNOSIS — C78.6 SECONDARY MALIGNANT NEOPLASM OF RETROPERITONEUM AND PERITONEUM: ICD-10-CM

## 2024-10-30 DIAGNOSIS — F41.1 GENERALIZED ANXIETY DISORDER: ICD-10-CM

## 2024-10-30 LAB — GLUCOSE SERPL-MCNC: 93 MG/DL (ref 70–110)

## 2024-10-30 PROCEDURE — 78815 PET IMAGE W/CT SKULL-THIGH: CPT | Mod: TC,PN

## 2024-10-30 PROCEDURE — A9552 F18 FDG: HCPCS | Mod: PN | Performed by: INTERNAL MEDICINE

## 2024-10-30 PROCEDURE — 78815 PET IMAGE W/CT SKULL-THIGH: CPT | Mod: 26,PS,, | Performed by: RADIOLOGY

## 2024-10-30 RX ORDER — FLUDEOXYGLUCOSE F18 500 MCI/ML
12 INJECTION INTRAVENOUS
Status: COMPLETED | OUTPATIENT
Start: 2024-10-30 | End: 2024-10-30

## 2024-10-30 RX ADMIN — FLUDEOXYGLUCOSE F-18 13.4 MILLICURIE: 500 INJECTION INTRAVENOUS at 10:10

## 2024-11-01 PROBLEM — R71.8 ELEVATED HEMATOCRIT: Status: ACTIVE | Noted: 2024-11-01

## 2025-01-29 ENCOUNTER — HOSPITAL ENCOUNTER (OUTPATIENT)
Dept: RADIOLOGY | Facility: HOSPITAL | Age: 55
Discharge: HOME | End: 2025-01-29
Attending: INTERNAL MEDICINE
Payer: MEDICARE

## 2025-01-29 ENCOUNTER — INFUSION (OUTPATIENT)
Dept: INFUSION THERAPY | Facility: HOSPITAL | Age: 55
End: 2025-01-29
Attending: NURSE PRACTITIONER
Payer: MEDICARE

## 2025-01-29 VITALS
HEART RATE: 86 BPM | WEIGHT: 250 LBS | TEMPERATURE: 98 F | SYSTOLIC BLOOD PRESSURE: 138 MMHG | BODY MASS INDEX: 31.08 KG/M2 | RESPIRATION RATE: 16 BRPM | HEIGHT: 75 IN | DIASTOLIC BLOOD PRESSURE: 86 MMHG

## 2025-01-29 DIAGNOSIS — C18.9 ADENOCARCINOMA OF COLON: Primary | ICD-10-CM

## 2025-01-29 DIAGNOSIS — I82.622 ACUTE DEEP VEIN THROMBOSIS (DVT) OF LEFT UPPER EXTREMITY, UNSPECIFIED VEIN: ICD-10-CM

## 2025-01-29 DIAGNOSIS — C18.9 MALIGNANT NEOPLASM OF COLON METASTATIC TO PERITONEUM: ICD-10-CM

## 2025-01-29 DIAGNOSIS — T45.1X5A CHEMOTHERAPY-INDUCED NEUTROPENIA: ICD-10-CM

## 2025-01-29 DIAGNOSIS — E86.0 DEHYDRATION: ICD-10-CM

## 2025-01-29 DIAGNOSIS — D70.1 CHEMOTHERAPY-INDUCED NEUTROPENIA: ICD-10-CM

## 2025-01-29 DIAGNOSIS — C78.6 MALIGNANT NEOPLASM OF COLON METASTATIC TO PERITONEUM: ICD-10-CM

## 2025-01-29 LAB — GLUCOSE SERPL-MCNC: 92 MG/DL (ref 70–110)

## 2025-01-29 PROCEDURE — 96523 IRRIG DRUG DELIVERY DEVICE: CPT | Mod: PN

## 2025-01-29 PROCEDURE — 63600175 PHARM REV CODE 636 W HCPCS: Mod: PN | Performed by: HOSPITALIST

## 2025-01-29 PROCEDURE — 78815 PET IMAGE W/CT SKULL-THIGH: CPT | Mod: 26,PS,, | Performed by: STUDENT IN AN ORGANIZED HEALTH CARE EDUCATION/TRAINING PROGRAM

## 2025-01-29 PROCEDURE — 78815 PET IMAGE W/CT SKULL-THIGH: CPT | Mod: TC,PN

## 2025-01-29 PROCEDURE — 25000003 PHARM REV CODE 250: Mod: PN | Performed by: HOSPITALIST

## 2025-01-29 PROCEDURE — A9552 F18 FDG: HCPCS | Mod: PN | Performed by: INTERNAL MEDICINE

## 2025-01-29 PROCEDURE — A4216 STERILE WATER/SALINE, 10 ML: HCPCS | Mod: PN | Performed by: HOSPITALIST

## 2025-01-29 RX ORDER — HEPARIN 100 UNIT/ML
500 SYRINGE INTRAVENOUS
Status: DISCONTINUED | OUTPATIENT
Start: 2025-01-29 | End: 2025-01-29 | Stop reason: HOSPADM

## 2025-01-29 RX ORDER — HEPARIN 100 UNIT/ML
500 SYRINGE INTRAVENOUS
OUTPATIENT
Start: 2025-01-29

## 2025-01-29 RX ORDER — FLUDEOXYGLUCOSE F18 500 MCI/ML
12 INJECTION INTRAVENOUS
Status: COMPLETED | OUTPATIENT
Start: 2025-01-29 | End: 2025-01-29

## 2025-01-29 RX ORDER — SODIUM CHLORIDE 0.9 % (FLUSH) 0.9 %
10 SYRINGE (ML) INJECTION
Status: DISCONTINUED | OUTPATIENT
Start: 2025-01-29 | End: 2025-01-29 | Stop reason: HOSPADM

## 2025-01-29 RX ORDER — SODIUM CHLORIDE 0.9 % (FLUSH) 0.9 %
10 SYRINGE (ML) INJECTION
OUTPATIENT
Start: 2025-01-29

## 2025-01-29 RX ADMIN — Medication 10 ML: at 02:01

## 2025-01-29 RX ADMIN — FLUDEOXYGLUCOSE F-18 13.6 MILLICURIE: 500 INJECTION INTRAVENOUS at 02:01

## 2025-01-29 RX ADMIN — Medication 500 UNITS: at 02:01

## 2025-01-29 NOTE — PROGRESS NOTES
PET Imaging Questionnaire    Are you a Diabetic? Recent Blood Sugar level? Yes    Are you anemic? Bone Marrow Stimulation Meds? Yes    Have you had a CT Scan, if so when & where was your last one? Yes -     Have you had a PET Scan, if so when & where was your last one? Yes -     Chemotherapy or currently on Chemotherapy? Yes    Radiation therapy? Yes    Surgical History:   Past Surgical History:   Procedure Laterality Date    ADENOIDECTOMY      ANOSCOPY N/A 02/24/2022    Procedure: ANOSCOPY;  Surgeon: KARIN Mittal MD;  Location: [facility] ENDO;  Service: Endoscopy;  Laterality: N/A;  WITH BOTOX    APPLICATION OF WOUND VACUUM-ASSISTED CLOSURE DEVICE N/A 10/27/2021    Procedure: APPLICATION, WOUND VAC;  Surgeon: KARIN Mittal MD;  Location: [facility] OR 2ND FLR;  Service: Colon and Rectal;  Laterality: N/A;    COLON SURGERY      2 colon resections    COLONOSCOPY N/A 02/02/2023    Procedure: COLONOSCOPY;  Surgeon: KARIN Mittal MD;  Location: [facility] ENDO;  Service: Endoscopy;  Laterality: N/A;  in January or February per his request please.  Thank you!    ENDOBRONCHIAL ULTRASOUND Bilateral 10/9/2023    Procedure: ENDOBRONCHIAL ULTRASOUND (EBUS);  Surgeon: Carlos Alberto Galdamez MD;  Location: [facility] OR;  Service: Pulmonary;  Laterality: Bilateral;    EXAMINATION UNDER ANESTHESIA N/A 10/26/2020    Procedure: Exam under anesthesia, flex sig, botox injection, lithotomy;  Surgeon: KARIN Mittal MD;  Location: [facility] OR 2ND FLR;  Service: Endoscopy;  Laterality: N/A;    EXAMINATION UNDER ANESTHESIA N/A 08/11/2021    Procedure: Exam under anesthesia, lithotomy, botox injection;  Surgeon: KARIN Mittal MD;  Location: [facility] OR 2ND FLR;  Service: Endoscopy;  Laterality: N/A;    EXCISION OF MASS OF ABDOMEN N/A 12/28/2020    Procedure: EXCISION, MASS, ABDOMEN;  Surgeon: KARIN Mittal MD;  Location: [facility] OR 2ND FLR;  Service: Colon and Rectal;  Laterality: N/A;    FLEXIBLE SIGMOIDOSCOPY  10/26/2020     TRANSFER - OUT REPORT: 
 
Verbal report given to RN(name) on Gisela Balderas  being transferred to (unit) for routine progression of care Report consisted of patients Situation, Background, Assessment and  
Recommendations(SBAR). Information from the following report(s) SBAR, ED Summary, [facility] and Recent Results was reviewed with the receiving nurse. Lines:  
Peripheral IV 02/05/21 Right Antecubital (Active) Site Assessment Clean, dry, & intact 02/05/21 2057 Phlebitis Assessment 0 02/05/21 2057 Infiltration Assessment 0 02/05/21 2057 Dressing Status Clean, dry, & intact 02/05/21 2057 Dressing Type Transparent 02/05/21 2057 Opportunity for questions and clarification was provided. Patient transported with: 
 [employer] Jayme Foster RN 
 
 
 
 
 
 
 Procedure: SIGMOIDOSCOPY, FLEXIBLE;  Surgeon: KARIN Mittal MD;  [facility]ND FLR;  Service: Endoscopy;;    FLEXIBLE SIGMOIDOSCOPY N/A 08/11/2021    Procedure: SIGMOIDOSCOPY, FLEXIBLE;  Surgeon: KARIN Mittal MD;  [facility] 2ND FLR;  Service: Endoscopy;  Laterality: N/A;    FLEXIBLE SIGMOIDOSCOPY N/A 09/22/2022    Procedure: Anoscopy with botox;  Surgeon: KARIN Mittal MD;  Location: [facility] ENDO;  Service: Endoscopy;  Laterality: N/A;    FLEXIBLE SIGMOIDOSCOPY N/A 9/28/2023    Procedure: SIGMOIDOSCOPY, FLEXIBLE;  Surgeon: KARIN Mittal MD;  Location: [facility] ENDO;  Service: Endoscopy;  Laterality: N/A;  anoscopy with botox    INJECTION OF BOTULINUM TOXIN TYPE A  10/26/2020    Procedure: INJECTION, BOTULINUM TOXIN, TYPE A;  Surgeon: KARIN Mittal MD;  Location: [facility] OR 2ND FLR;  Service: Endoscopy;;    INJECTION OF BOTULINUM TOXIN TYPE A  08/11/2021    Procedure: INJECTION, BOTULINUM TOXIN, TYPE A;  Surgeon: KARIN Mittal MD;  Location: [facility] OR 2ND FLR;  Service: Endoscopy;;    INJECTION OF BOTULINUM TOXIN TYPE A N/A 02/24/2022    Procedure: INJECTION, BOTULINUM TOXIN, TYPE A;  Surgeon: KARIN Mittal MD;  Location: [facility] ENDO;  Service: Endoscopy;  Laterality: N/A;    INJECTION OF BOTULINUM TOXIN TYPE A  02/02/2023    Procedure: INJECTION, BOTULINUM TOXIN, TYPE A;  Surgeon: KARIN Mittal MD;  Location: [facility] ENDO;  Service: Endoscopy;;  rectum      PORTACATH PLACEMENT Right     Right Chest Wall    ROBOTIC BRONCHOSCOPY Bilateral 10/9/2023    Procedure: ROBOTIC BRONCHOSCOPY;  Surgeon: Carlos Alberto Galdamez MD;  Location: [facility] OR;  Service: Pulmonary;  Laterality: Bilateral;    TONSILLECTOMY      WOUND DEBRIDEMENT N/A 10/27/2021    Procedure: DEBRIDEMENT, WOUND,;  Surgeon: KARIN Mittal MD;  Location: [facility] OR 2ND FLR;  Service: Colon and Rectal;  Laterality: N/A;        Have you been fasting for at least 6 hours? Yes    Is there any chance you may be  pregnant or breastfeeding? No    Assay: 15.2 MCi@:1429   Injection Site:port inj     Residual: 1.62 mCi@: 2811   Technologist: Marcy Regalado Injected:13.6mCi

## 2025-01-29 NOTE — PLAN OF CARE
Problem: Adult Inpatient Plan of Care  Goal: Plan of Care Review  Outcome: Progressing  Flowsheets (Taken 1/29/2025 1430)  Plan of Care Reviewed With: patient  Pt port accessed for PET scan, med flushed via port per VALENTÍN Vidal, and flushed with 20 ml NS and heparin locked with ease. Pt tolerated well with no changes throughout procedure and aware of need for port flush in 6 weeks. Pt states he follows at [facility] with Dr. Lee and will have done there.

## 2025-04-01 DIAGNOSIS — N52.35 ERECTILE DYSFUNCTION FOLLOWING RADIATION THERAPY: ICD-10-CM

## 2025-04-02 RX ORDER — SILDENAFIL 100 MG/1
100 TABLET, FILM COATED ORAL DAILY PRN
Qty: 30 TABLET | Refills: 0 | Status: SHIPPED | OUTPATIENT
Start: 2025-04-02 | End: 2026-04-02

## 2025-05-15 ENCOUNTER — HOSPITAL ENCOUNTER (OUTPATIENT)
Dept: RADIOLOGY | Facility: HOSPITAL | Age: 55
Discharge: HOME | End: 2025-05-15
Attending: INTERNAL MEDICINE
Payer: MEDICARE

## 2025-05-15 DIAGNOSIS — C18.4 MALIGNANT NEOPLASM OF TRANSVERSE COLON: ICD-10-CM

## 2025-05-15 LAB — GLUCOSE SERPL-MCNC: 58 MG/DL (ref 70–110)

## 2025-05-15 PROCEDURE — A9552 F18 FDG: HCPCS | Mod: PN | Performed by: INTERNAL MEDICINE

## 2025-05-15 PROCEDURE — 78815 PET IMAGE W/CT SKULL-THIGH: CPT | Mod: 26,PS,, | Performed by: STUDENT IN AN ORGANIZED HEALTH CARE EDUCATION/TRAINING PROGRAM

## 2025-05-15 PROCEDURE — 78815 PET IMAGE W/CT SKULL-THIGH: CPT | Mod: TC,PN

## 2025-05-15 RX ORDER — FLUDEOXYGLUCOSE F18 500 MCI/ML
11.5 INJECTION INTRAVENOUS
Status: COMPLETED | OUTPATIENT
Start: 2025-05-15 | End: 2025-05-15

## 2025-05-15 RX ADMIN — FLUDEOXYGLUCOSE F-18 11.5 MILLICURIE: 500 INJECTION INTRAVENOUS at 10:05

## 2025-05-15 NOTE — PROGRESS NOTES
PET Imaging Questionnaire    Are you a Diabetic? Recent Blood Sugar level? Yes    Are you anemic? Bone Marrow Stimulation Meds? Yes    Have you had a CT Scan, if so when & where was your last one? Yes -     Have you had a PET Scan, if so when & where was your last one? Yes -     Chemotherapy or currently on Chemotherapy? Yes    Radiation therapy? Yes    Surgical History:   Past Surgical History:   Procedure Laterality Date    ADENOIDECTOMY      ANOSCOPY N/A 02/24/2022    Procedure: ANOSCOPY;  Surgeon: KARIN Mittal MD;  Location: [facility];  Service: Endoscopy;  Laterality: N/A;  WITH BOTOX    APPLICATION OF WOUND VACUUM-ASSISTED CLOSURE DEVICE N/A 10/27/2021    Procedure: APPLICATION, WOUND VAC;  Surgeon: KARIN Mittal MD;  Location: [facility] OR 2ND FLR;  Service: Colon and Rectal;  Laterality: N/A;    COLON SURGERY      2 colon resections    COLONOSCOPY N/A 02/02/2023    Procedure: COLONOSCOPY;  Surgeon: KARIN Mittal MD;  Location: [facility] END[facility];  Service: Endoscopy;  Laterality: N/A;  in January or February per his request please.  Thank you!    ENDOBRONCHIAL ULTRASOUND Bilateral 10/9/2023    Procedure: ENDOBRONCHIAL ULTRASOUND (EBUS);  Surgeon: Carlos Alberto Galdamez MD;  Location: [facility] OR;  Service: Pulmonary;  Laterality: Bilateral;    EXAMINATION UNDER ANESTHESIA N/A 10/26/2020    Procedure: Exam under anesthesia, flex sig, botox injection, lithotomy;  Surgeon: KARIN Mittal MD;  Location: [facility] OR 2ND FLR;  Service: Endoscopy;  Laterality: N/A;    EXAMINATION UNDER ANESTHESIA N/A 08/11/2021    Procedure: Exam under anesthesia, lithotomy, botox injection;  Surgeon: KARIN Mittal MD;  Location: [facility] OR 2ND FLR;  Service: Endoscopy;  Laterality: N/A;    EXCISION OF MASS OF ABDOMEN N/A 12/28/2020    Procedure: EXCISION, MASS, ABDOMEN;  Surgeon: KARIN Mittal MD;  Location: [facility] OR 2ND FLR;  Service: Colon and Rectal;  Laterality: N/A;    FLEXIBLE SIGMOIDOSCOPY  10/26/2020     Procedure: SIGMOIDOSCOPY, FLEXIBLE;  Surgeon: KARIN Mittal MD;  [facility];  Service: Endoscopy;;    FLEXIBLE SIGMOIDOSCOPY N/A 08/11/2021    Procedure: SIGMOIDOSCOPY, FLEXIBLE;  Surgeon: KARIN Mittal MD;  [facility]: [facility];  Service: Endoscopy;  Laterality: N/A;    FLEXIBLE SIGMOIDOSCOPY N/A 09/22/2022    Procedure: Anoscopy with botox;  Surgeon: KARIN Mittal MD;  Location: [facility] END[facility];  Service: Endoscopy;  Laterality: N/A;    FLEXIBLE SIGMOIDOSCOPY N/A 9/28/2023    Procedure: SIGMOIDOSCOPY, FLEXIBLE;  Surgeon: KARIN Mittal MD;  Location: [facility] END[facility];  Service: Endoscopy;  Laterality: N/A;  anoscopy with botox    INJECTION OF BOTULINUM TOXIN TYPE A  10/26/2020    Procedure: INJECTION, BOTULINUM TOXIN, TYPE A;  Surgeon: KARIN Mittal MD;  Location: [facility] OR 2ND FLR;  Service: Endoscopy;;    INJECTION OF BOTULINUM TOXIN TYPE A  08/11/2021    Procedure: INJECTION, BOTULINUM TOXIN, TYPE A;  Surgeon: KARIN Mittal MD;  Location: [facility] OR 2ND FLR;  Service: Endoscopy;;    INJECTION OF BOTULINUM TOXIN TYPE A N/A 02/24/2022    Procedure: INJECTION, BOTULINUM TOXIN, TYPE A;  Surgeon: KARIN Mittal MD;  Location: [facility];  Service: Endoscopy;  Laterality: N/A;    INJECTION OF BOTULINUM TOXIN TYPE A  02/02/2023    Procedure: INJECTION, BOTULINUM TOXIN, TYPE A;  Surgeon: KARIN Mittal MD;  Location: [facility] END[facility];  Service: Endoscopy;;  rectum      PORTACATH PLACEMENT Right     Right Chest Wall    ROBOTIC BRONCHOSCOPY Bilateral 10/9/2023    Procedure: ROBOTIC BRONCHOSCOPY;  Surgeon: Carlos Alberto Galdamez MD;  Location: [facility] OR;  Service: Pulmonary;  Laterality: Bilateral;    TONSILLECTOMY      WOUND DEBRIDEMENT N/A 10/27/2021    Procedure: DEBRIDEMENT, WOUND,;  Surgeon: KRAIN Mittal MD;  Location: [facility] 2[facility];  Service: Colon and Rectal;  Laterality: N/A;        Have you been fasting for at least 6 hours? Yes    Is there any chance you may be  pregnant or breastfeeding? No    Assay: 12.5 MCi@:1033   Injection Site:LAC    Residual: .992 mCi@: 1035   Technologist: Doyle Regalado Injected:11.5mCi

## 2025-08-28 ENCOUNTER — HOSPITAL ENCOUNTER (OUTPATIENT)
Dept: RADIOLOGY | Facility: HOSPITAL | Age: 55
Discharge: HOME | End: 2025-08-28
Attending: INTERNAL MEDICINE
Payer: MEDICARE

## 2025-08-28 DIAGNOSIS — C18.4 MALIGNANT NEOPLASM OF TRANSVERSE COLON: ICD-10-CM

## 2025-08-28 LAB — GLUCOSE SERPL-MCNC: 106 MG/DL (ref 70–110)

## 2025-08-28 PROCEDURE — 78815 PET IMAGE W/CT SKULL-THIGH: CPT | Mod: 26,PS,, | Performed by: RADIOLOGY

## 2025-08-28 PROCEDURE — 78815 PET IMAGE W/CT SKULL-THIGH: CPT | Mod: TC,PN

## 2025-08-28 PROCEDURE — A9552 F18 FDG: HCPCS | Mod: PN | Performed by: INTERNAL MEDICINE

## 2025-08-28 RX ORDER — FLUDEOXYGLUCOSE F18 500 MCI/ML
12.6 INJECTION INTRAVENOUS
Status: COMPLETED | OUTPATIENT
Start: 2025-08-28 | End: 2025-08-28

## 2025-08-28 RX ADMIN — FLUDEOXYGLUCOSE F-18 12.6 MILLICURIE: 500 INJECTION INTRAVENOUS at 11:08

## (undated) DEVICE — GAUZE SPONGE 4X4 12PLY

## (undated) DEVICE — EVACUATOR WOUND BULB 100CC

## (undated) DEVICE — DRESSING ADH ISLAND 3.6 X 14

## (undated) DEVICE — TAPE SILK 3IN

## (undated) DEVICE — LUBRICANT SURGILUBE 2 OZ

## (undated) DEVICE — TRAY MINOR GEN SURG

## (undated) DEVICE — ELECTRODE REM PLYHSV RETURN 9

## (undated) DEVICE — Device

## (undated) DEVICE — SEE MEDLINE ITEM 146417

## (undated) DEVICE — SEE MEDLINE ITEM 156902

## (undated) DEVICE — BRIEF MESH LARGE

## (undated) DEVICE — TRAY FOLEY 16FR INFECTION CONT

## (undated) DEVICE — DRAIN BLAKE SIL HUBLS RND 19FR

## (undated) DEVICE — SUT CTD VICRYL VIL BR CR/SH

## (undated) DEVICE — DRAPE INCISE IOBAN 2 23X17IN

## (undated) DEVICE — SEE MEDLINE ITEM 152622

## (undated) DEVICE — SUT 1 48IN PDS II VIO MONO

## (undated) DEVICE — SUT 4/0 27IN COATED VICRYL

## (undated) DEVICE — PANTIES FEMININE NAPKIN LG/XLG

## (undated) DEVICE — SUT 3-0 VICRYL SH CR/8 18

## (undated) DEVICE — SEE MEDLINE ITEM 146347

## (undated) DEVICE — SEE MEDLINE ITEM 157148

## (undated) DEVICE — BOVIE SUCTION

## (undated) DEVICE — SUT 0 18IN COATED VICRYL V

## (undated) DEVICE — TUBING SUCTION STERILE

## (undated) DEVICE — SEE MEDLINE ITEM 154981

## (undated) DEVICE — DRAPE ABDOMINAL TIBURON 14X11

## (undated) DEVICE — SUT ETHILON 3-0 PS2 18 BLK